# Patient Record
Sex: MALE | Race: WHITE | NOT HISPANIC OR LATINO | Employment: OTHER | URBAN - METROPOLITAN AREA
[De-identification: names, ages, dates, MRNs, and addresses within clinical notes are randomized per-mention and may not be internally consistent; named-entity substitution may affect disease eponyms.]

---

## 2017-05-01 ENCOUNTER — ALLSCRIPTS OFFICE VISIT (OUTPATIENT)
Dept: OTHER | Facility: OTHER | Age: 72
End: 2017-05-01

## 2017-05-04 ENCOUNTER — GENERIC CONVERSION - ENCOUNTER (OUTPATIENT)
Dept: OTHER | Facility: OTHER | Age: 72
End: 2017-05-04

## 2017-05-10 ENCOUNTER — GENERIC CONVERSION - ENCOUNTER (OUTPATIENT)
Dept: OTHER | Facility: OTHER | Age: 72
End: 2017-05-10

## 2017-05-17 ENCOUNTER — HOSPITAL ENCOUNTER (OUTPATIENT)
Dept: RADIOLOGY | Age: 72
Discharge: HOME | End: 2017-05-17
Payer: COMMERCIAL

## 2017-05-17 DIAGNOSIS — Z00.00 ENCOUNTER FOR GENERAL ADULT MEDICAL EXAMINATION WITHOUT ABNORMAL FINDINGS: ICD-10-CM

## 2017-05-17 DIAGNOSIS — J44.9 CHRONIC OBSTRUCTIVE PULMONARY DISEASE (HCC): ICD-10-CM

## 2017-05-17 PROCEDURE — 71260 CT THORAX DX C+: CPT

## 2017-05-17 RX ADMIN — IOHEXOL 85 ML: 350 INJECTION, SOLUTION INTRAVENOUS at 10:00

## 2017-05-19 ENCOUNTER — GENERIC CONVERSION - ENCOUNTER (OUTPATIENT)
Dept: OTHER | Facility: OTHER | Age: 72
End: 2017-05-19

## 2017-05-24 ENCOUNTER — GENERIC CONVERSION - ENCOUNTER (OUTPATIENT)
Dept: OTHER | Facility: OTHER | Age: 72
End: 2017-05-24

## 2017-05-30 ENCOUNTER — GENERIC CONVERSION - ENCOUNTER (OUTPATIENT)
Dept: OTHER | Facility: OTHER | Age: 72
End: 2017-05-30

## 2017-05-30 ENCOUNTER — ALLSCRIPTS OFFICE VISIT (OUTPATIENT)
Dept: OTHER | Facility: OTHER | Age: 72
End: 2017-05-30

## 2017-12-12 ENCOUNTER — LAB REQUISITION (OUTPATIENT)
Dept: LAB | Facility: HOSPITAL | Age: 72
End: 2017-12-12
Payer: COMMERCIAL

## 2017-12-12 DIAGNOSIS — C61 MALIGNANT NEOPLASM OF PROSTATE (HCC): ICD-10-CM

## 2017-12-12 LAB — PSA SERPL-MCNC: 0.5 NG/ML (ref 0–4)

## 2017-12-12 PROCEDURE — G0103 PSA SCREENING: HCPCS | Performed by: UROLOGY

## 2018-01-05 ENCOUNTER — ALLSCRIPTS OFFICE VISIT (OUTPATIENT)
Dept: OTHER | Facility: OTHER | Age: 73
End: 2018-01-05

## 2018-01-06 NOTE — PROGRESS NOTES
Assessment   1  Chronic obstructive pulmonary disease (496) (J44 9)   2  COPD exacerbation (491 21) (J44 1)    Plan   COPD exacerbation    · Azithromycin 250 MG Oral Tablet; TAKE 2 TABLETS ON DAY 1 THEN TAKE 1    TABLET A DAY FOR 4 DAYS   · Hydrocod Polst-CPM Polst ER 10-8 MG/5ML Oral Suspension Extended Release; 1    tsp every 12 hours as needed for cough    Discussion/Summary      We will initiate antibiotic therapy with azithromycin and we will add had a some long acting cough suppressant  The patient is encouraged to continue use of his Breo  If he should develop any high fever shaking chills he should contact the office for further instructions  Chief Complaint   pt here c/o coughing which is painful, and body aches from coughing,      History of Present Illness   HPI: Patient presents to the office with a 3 four-day history of worsening cough associated with chest pain and body aches  He denies any exposure to anyone with influenza  He has not had the flu vaccine  He denies any high fever his only complaint is that of a cough and the associated chest discomfort  He denies sore throat, he denies earaches, he denies any sinus congestion, he denies fevers, chills or night sweats  He has had no nausea, vomiting or diarrhea  Review of Systems        Constitutional: feeling poorly-- and-- feeling tired, but-- no fever-- and-- no chills  ENT: no complaints of earache, no loss of hearing, no nosebleeds or nasal discharge, no sore throat or hoarseness  Cardiovascular: chest pain  Respiratory: cough  Gastrointestinal: no complaints of abdominal pain, no constipation, no nausea or vomiting, no diarrhea or bloody stools  Genitourinary: no complaints of dysuria or incontinence, no hesitancy, no nocturia, no genital lesion, no inadequacy of penile erection  Musculoskeletal: myalgias  Integumentary: no complaints of skin rash or lesion, no itching or dry skin, no skin wounds  Neurological: no complaints of headache, no confusion, no numbness or tingling, no dizziness or fainting  Active Problems   1  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)   2  Chronic obstructive pulmonary disease (496) (J44 9)   3  Depression screening (V79 0) (Z13 89)   4  Diverticular disease of colon (562 10) (K57 30)   5  Elevated prostate specific antigen (PSA) (790 93) (R97 20)   6  Encounter for Medicare annual wellness exam (V70 0) (Z00 00)   7  Exertional shortness of breath (786 05) (R06 02)   8  Hemoptysis (786 30) (R04 2)   9  Hemorrhoids (455 6) (K64 9)   10  Hiatal hernia (553 3) (K44 9)   11  Lung nodule (793 11) (R91 1)   12  Organic impotence (607 84) (N52 9)   13  Prostate cancer (185) (C61)   14  Screening for genitourinary condition (V81 6) (Z13 89)   15  Screening for neurological condition (V80 09) (Z13 89)   16  Seborrheic keratosis (702 19) (L82 1)   17  Tobacco abuse (305 1) (Z72 0)    Past Medical History   Active Problems And Past Medical History Reviewed: The active problems and past medical history were reviewed and updated today  Social History    · Alcohol Use (History)   · Current Smoker (305 1)   · Never Used Drugs  The social history was reviewed and is unchanged  Current Meds    1  Breo Ellipta 100-25 MCG/INH Inhalation Aerosol Powder Breath Activated; INHALE 1     PUFFS Daily; Therapy: 11HCE1308 to (Evaluate:48Xbe1386)  Requested for: 61YDP9681; Last     Rx:20Pds3172 Ordered     The medication list was reviewed and updated today  Allergies   1  Penicillins  2  No Known Environmental Allergies   3   No Known Food Allergies    Vitals    Recorded: 32QAH4369 11:00AM   Temperature 97 9 F, Oral   Heart Rate 80   Pulse Quality Normal   Systolic 859, LUE, Sitting   Diastolic 68, LUE, Sitting   Height 5 ft 8 in   Weight 151 lb 2 oz   BMI Calculated 22 98   BSA Calculated 1 81     Physical Exam        Constitutional      General appearance: No acute distress, well appearing and well nourished  Eyes      Conjunctiva and lids: No swelling, erythema, or discharge  Pupils and irises: Equal, round and reactive to light  Ears, Nose, Mouth, and Throat      External inspection of ears and nose: Normal        Nasal mucosa, septum, and turbinates: Normal without edema or erythema  Oropharynx: Normal with no erythema, edema, exudate or lesions  Pulmonary      Respiratory effort: No increased work of breathing or signs of respiratory distress  -- Diminished breath sounds bilaterally without rales, rhonchi or wheezing  Cardiovascular      Palpation of heart: Normal PMI, no thrills  Auscultation of heart: Normal rate and rhythm, normal S1 and S2, without murmurs  Examination of extremities for edema and/or varicosities: Normal        Abdomen      Abdomen: Non-tender, no masses  Lymphatic      Palpation of lymph nodes in neck: No lymphadenopathy  Musculoskeletal      Gait and station: Normal        Digits and nails: Normal without clubbing or cyanosis  Skin      Skin and subcutaneous tissue: Normal without rashes or lesions  Neurologic No focal deficit        Psychiatric      Orientation to person, place and time: Normal        Mood and affect: Normal           Signatures    Electronically signed by : Samuel Hinkle MD; Jan 5 2018 11:54AM EST                       (Author)

## 2018-01-12 NOTE — PROGRESS NOTES
Preliminary Nursing Report                Patient Information    Initial Encounter Entry Date:   2016 9:10 AM EST (Automated Transmission Automated Transmission)       Last Modified:   {Alyssa Hall}              Legal Name: Honey Cross        Social Security Number:        YOB: 1945        Age (years): 65        Gender: M        Body Mass Index (BMI): 23 kg/m2        Height: 69 in  Weight: 156 lbs (71 kgs)           Address:   [address] [place] [address]              Phone: [phone]        Email:        Ethnicity: [facility]        Church:        Marital Status:        Preferred Language: English        Race: Other Race                    Patient Insurance Information        Primary Insurance Information Carrier Name: {Primary  CarrierName}           Carrier Address:   {Primary  CarrierAddress}              Carrier Phone: {Primary  CarrierPhone}          Group Number: {Primary  GroupNumber}          Policy Number: {Primary  PolicyNumber}          Insured Name: {Primary  InsuredName}          Insured : {Primary  InsuredDOB}          Relationship to Insured: {Primary  RelationshiptoInsured}           Secondary Insurance Information Carrier Name: {Secondary  CarrierName}           Carrier Address:   {Secondary  CarrierAddress}              Carrier Phone: {Secondary  CarrierPhone}          Group Number: {Secondary  GroupNumber}          Policy Number: {Secondary  PolicyNumber}          Insured Name: {Secondary  InsuredName}          Insured : {Secondary  InsuredDOB}          Relationship to Insured: {Secondary  RelationshiptoInsured}                       Health Profile   Booking #:   Chelsie Langston #: [id number]               DOS: 2016    Surgery : INSERTION OF MULTI-COMPONENT, INFLATABLE PENILE PROSTHESIS, INCLUDING PLACEMENT OF PUMP, CYLINDERS, AND RESERVOIR    Add'l Procedures/Notes:     Surgery Risk: Minor          Precautions          Allergies No Known Environmental Allergies       No Known Food Allergies       Penicillins             Medications    Clotrimazole 1 % External Cream       Levitra 20 MG Oral Tablet       Symbicort 160-4 5 MCG/ACT Inhalation Aerosol       Zyrtec 10 MG TABS               Conditions    Chronic obstructive pulmonary disease       Contact dermatitis       Elevated prostate specific antigen (PSA)       Hemoptysis       Limited Exercise Tolerance       Organic impotence       Prostate cancer       Screening for genitourinary condition       Screening for neurological condition       Seborrheic keratosis               Family History    None             Surgical History    None             Social History    Alcohol Use (History)       Current Smoker       Never Used Drugs                               Patient Instructions       ? NPO Instructions   The day before surgery it is recommended to have a light dinner at your usual time and you are allowed a light snack early in the evening  Do not eat anything heavy or eat a big meal after 7pm  Do not eat or drink anything after midnight prior to your surgery  If you are supposed to take any of your medications, do so with a sip of water  Failure to follow these instructions can lead to an increased risk of lung complications and may result in a delay or cancellation of your procedure  If you have any questions, contact your institution for further instructions  No candy, no gum, no mints, no chewing tobacco   Triggered by: Medical Procedure Risk         ? Smoking Cessation   Smoking before and after surgery can lead to complications  Patients who quit smoking at least eight weeks before surgery have complication rates almost as low as non-smokers  Smokers who can stop smoking 24 or 48 hours before surgery may also benefit from decreased amounts of nicotine and carbon monoxide in the body   For help quitting smoking, speak with your physician or contact the Charline Fernandez or American [employer] Association  Please visit the following web address for assistance with quitting  [facility] [address] [employer]  [facility]/Anesthesia-Topics/[email or web address]-[email or web address]  aspx  Triggered by: Current Smoker               Testing Considerations       ? Urinalysis t  Urinalysis may be appropriate if recent urinary symptoms or implants are being placed in surgical procedure  Triggered by: Medical Procedure               Consultations       No recommendations for this classification  Miscellaneous Questions         Question: Are you able to walk up a flight of stairs, walk up a hill or do heavy housework WITHOUT having chest pain or shortness of breath? Answer: NO                   Allergies/Conditions/Medications Not Found        The following were not recognized by our system when generating the recommendations  Please consider if this would impact any preoperative protocols  ? Alcohol Use (History)       ? Never Used Drugs       ? Screening for neurological condition       ? Symbicort 160-4 5 MCG/ACT Inhalation Aerosol                  Appointment Information         Date:    03/08/2016        Location:    [facility]        Address:           Directions:                      Footnotes revision 14      ?? Denotes a free-text entry  Legal Disclaimer: Any and all recommendations and services provided herein are designed to assist in the preoperative care of the patient  Nothing contained herein is designed to replace, eliminate or alleviate the responsibility of the attending physician to supervise and determine the patient?s preoperative care and course of treatment  Failure to provide complete, accurate information may negatively impact the system?s ability to recommend the proper preoperative protocol       THE ATTENDING PHYSICIAN IS RESPONSIBLE TO REVIEW THE SUGGESTED PREOPERATIVE PROTOCOLS/COURSE OF TREATMENT AND PRESCRIBE THE FINAL COURSE OF PREOPERATIVE TREATMENT IN CONSULTATION WITH THE PATIENT  THE ePREOP SYSTEM AND ITS MATERIALS ARE PROVIDED ? AS IS? WITHOUT WARRANTY OF ANY KIND, EXPRESS OR IMPLIED, INCLUDING, BUT NOT LIMITED TO, WARRANTIES OF PERFORMANCE OR MERCHANTABILITY OR FITNESS FOR A PARTICULAR PURPOSE  PATIENT AND PHYSICIANS HEREBY AGREE THAT THEIR USE OF THE MATERIALS AND RESOURCES ACT AS A CONSENT TO RELEASE AND WAIVE ePREOP FROM ANY AND ALL CLAIMS OF WARRANTY, TORT OR CONTRACT LAW OF ANY KIND             Electronically signed by:Davi Barakat MD  Feb 16 2016  1:35PM EST

## 2018-01-14 VITALS
WEIGHT: 153.38 LBS | TEMPERATURE: 96.9 F | BODY MASS INDEX: 23.25 KG/M2 | HEART RATE: 63 BPM | OXYGEN SATURATION: 97 % | SYSTOLIC BLOOD PRESSURE: 122 MMHG | DIASTOLIC BLOOD PRESSURE: 82 MMHG | HEIGHT: 68 IN

## 2018-01-14 VITALS
BODY MASS INDEX: 23.04 KG/M2 | TEMPERATURE: 97.1 F | WEIGHT: 152 LBS | OXYGEN SATURATION: 94 % | HEIGHT: 68 IN | HEART RATE: 54 BPM | SYSTOLIC BLOOD PRESSURE: 136 MMHG | DIASTOLIC BLOOD PRESSURE: 76 MMHG

## 2018-01-22 VITALS
HEIGHT: 68 IN | SYSTOLIC BLOOD PRESSURE: 120 MMHG | TEMPERATURE: 97.9 F | HEART RATE: 80 BPM | WEIGHT: 151.13 LBS | BODY MASS INDEX: 22.9 KG/M2 | DIASTOLIC BLOOD PRESSURE: 68 MMHG

## 2018-02-12 RX ORDER — FLUTICASONE FUROATE AND VILANTEROL TRIFENATATE 100; 25 UG/1; UG/1
POWDER RESPIRATORY (INHALATION)
Refills: 2 | OUTPATIENT
Start: 2018-02-12

## 2018-02-16 ENCOUNTER — OFFICE VISIT (OUTPATIENT)
Dept: INTERNAL MEDICINE CLINIC | Facility: CLINIC | Age: 73
End: 2018-02-16
Payer: COMMERCIAL

## 2018-02-16 VITALS
SYSTOLIC BLOOD PRESSURE: 146 MMHG | BODY MASS INDEX: 22.91 KG/M2 | DIASTOLIC BLOOD PRESSURE: 84 MMHG | TEMPERATURE: 97.7 F | HEART RATE: 64 BPM | WEIGHT: 151.2 LBS | HEIGHT: 68 IN | OXYGEN SATURATION: 97 %

## 2018-02-16 DIAGNOSIS — M62.81 MUSCLE WEAKNESS: ICD-10-CM

## 2018-02-16 DIAGNOSIS — J44.1 COPD EXACERBATION (HCC): ICD-10-CM

## 2018-02-16 DIAGNOSIS — J43.1 PANLOBULAR EMPHYSEMA (HCC): ICD-10-CM

## 2018-02-16 DIAGNOSIS — N52.35 ERECTILE DYSFUNCTION FOLLOWING RADIATION THERAPY: ICD-10-CM

## 2018-02-16 DIAGNOSIS — L30.9 DERMATITIS: ICD-10-CM

## 2018-02-16 DIAGNOSIS — Z13.6 ENCOUNTER FOR ABDOMINAL AORTIC ANEURYSM (AAA) SCREENING: ICD-10-CM

## 2018-02-16 DIAGNOSIS — K22.70 BARRETT'S ESOPHAGUS WITHOUT DYSPLASIA: Primary | ICD-10-CM

## 2018-02-16 DIAGNOSIS — R91.1 LUNG NODULE: ICD-10-CM

## 2018-02-16 PROBLEM — K64.9 HEMORRHOIDS: Status: ACTIVE | Noted: 2017-05-06

## 2018-02-16 PROBLEM — N52.9 ERECTILE DYSFUNCTION: Status: ACTIVE | Noted: 2017-12-12

## 2018-02-16 PROCEDURE — 99214 OFFICE O/P EST MOD 30 MIN: CPT | Performed by: INTERNAL MEDICINE

## 2018-02-16 RX ORDER — FLUTICASONE FUROATE AND VILANTEROL 100; 25 UG/1; UG/1
1 POWDER RESPIRATORY (INHALATION) DAILY
COMMUNITY
Start: 2017-05-01 | End: 2018-02-23 | Stop reason: SDUPTHER

## 2018-02-16 RX ORDER — LANSOPRAZOLE 30 MG/1
1 CAPSULE, DELAYED RELEASE ORAL DAILY
Refills: 5 | COMMUNITY
Start: 2018-01-16 | End: 2019-02-15

## 2018-02-16 NOTE — PROGRESS NOTES
Assessment/Plan:       Diagnoses and all orders for this visit:    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  He is followed up by the  GI for his the Smith's esophagus  Encounter for abdominal aortic aneurysm (AAA) screening  -     US abdominal aorta screening aaa; Future    COPD exacerbation ([address] )  -     Spirometry; Future   COPD is stable  Lung nodule  -      Follow-up CT scan did not reveal any new lung nodule which was done at the end of the 2017  Panlobular emphysema [facility])   patient is again advised to lose not to smoke or drink alcohol that will help his the Smith's esophagus problem and also his problem with emphysema  Erectile dysfunction following radiation therapy   erectile dysfunction is secondary to radiation treatment for prostate cancer  Other orders  -     fluticasone furoate-vilanterol (BREO ELLIPTA); Inhale 1 puff daily  -     lansoprazole (PREVACID) 30 mg capsule; Take 1 capsule by mouth 2 (two) times a day as needed          Subjective:    presently patient has a complain of some stool rash on the lower back also wrinkling of his skin and skin rash on the lower back   Patient ID: Martínez Sheriff is a 74 y o  male  HPI    The following portions of the patient's history were reviewed and updated as appropriate: allergies, current medications, past family history, past medical history, past social history, past surgical history and problem list   COPD (Follow-Up): The patient states he has been he had t the CT scan of the lung , he still is smoking he is using the Breo once a day, but doing well with his COPD control since the last visit  Symptoms: worsened dyspnea on exertion,Ik8esvxkpma exercise intolerance,Md7gicdrblj coughing,Ik3kdbsvvzh coughing up sputum,Np9sximkc wheezing,De3iuvbwt lower extremity [id number]   Associated Symptoms: no fever  Smith Esophagus (Brief): The patient is being seen for a routine clinic follow-up of Smith's esophagus   The patient is currently asymptomatic  No associated symptoms are reported  Review of Systems   Constitutional: Negative for appetite change, fatigue and fever  HENT: Negative for congestion, ear pain, hearing loss, nosebleeds, sneezing, tinnitus and voice change  Eyes: Negative for pain, discharge and redness  Respiratory: Positive for shortness of breath  Negative for cough, chest tightness and wheezing  Cardiovascular: Negative for chest pain, palpitations and leg swelling  Gastrointestinal: Negative for abdominal pain, blood in stool, constipation, diarrhea, nausea and vomiting  Genitourinary: Negative for difficulty urinating, dysuria, hematuria and urgency  Musculoskeletal: Negative for arthralgias, back pain, gait problem and joint swelling  Skin: Positive for rash  Negative for wound  Allergic/Immunologic: Negative for environmental allergies  Neurological: Negative for dizziness, tremors, seizures, weakness, light-headedness and numbness  Hematological: Negative for adenopathy  Does not bruise/bleed easily  Psychiatric/Behavioral: Negative for behavioral problems and confusion  The patient is not nervous/anxious            Past Medical History:   Diagnosis Date    Esophageal reflux     Inguinal hernia     Rectal adenocarcinoma (HCC)     Spermatocele          Current Outpatient Prescriptions:     fluticasone furoate-vilanterol (BREO ELLIPTA), Inhale 1 puff daily, Disp: , Rfl:     lansoprazole (PREVACID) 30 mg capsule, Take 1 capsule by mouth 2 (two) times a day as needed, Disp: , Rfl: 5    Allergies   Allergen Reactions    Penicillins Hives     Reaction Unknown       Social History   Past Surgical History:   Procedure Laterality Date    INSERTION PROSTATE RADIATION SEED      OK INSERT,INFLATABLE PENILE PROSTHESIS N/A 3/8/2016    Procedure: INSERTION OF THREE PIECE PENILE PROSTHESIS;  Surgeon: Andreia Resendiz MD;  [facility];  Service: Urology    PROSTATE BIOPSY  01/06/2014 History reviewed  No pertinent family history  Objective:  /84 (BP Location: Left arm, Patient Position: Sitting, Cuff Size: Adult)   Pulse 64   Temp 97 7 °F (36 5 °C) (Oral)   Ht 5' 8" (1 727 m)   Wt 68 6 kg (151 lb 3 2 oz)   SpO2 97%   BMI 22 99 kg/m²        Physical Exam   Constitutional: He is oriented to person, place, and time  He appears well-developed and well-nourished  HENT:   Right Ear: External ear normal    Mouth/Throat: Oropharynx is clear and moist    Eyes: Conjunctivae and EOM are normal  Pupils are equal, round, and reactive to light  Neck: Normal range of motion  No JVD present  No thyromegaly present  Cardiovascular: Normal rate, regular rhythm, normal heart sounds and intact distal pulses  Pulmonary/Chest: Breath sounds normal    Decreased breath sounds bilaterally   Abdominal: Soft  Bowel sounds are normal    Musculoskeletal: Normal range of motion  Lymphadenopathy:     He has no cervical adenopathy  Neurological: He is alert and oriented to person, place, and time  He has normal reflexes  Skin: Skin is dry  Very dry skin plus lot of wrinkling of the skin also on the back there is some macular rash in the lower part of the back   Psychiatric: He has a normal mood and affect   His behavior is normal  Judgment and thought content normal

## 2018-02-19 RX ORDER — FLUTICASONE FUROATE AND VILANTEROL TRIFENATATE 100; 25 UG/1; UG/1
1 POWDER RESPIRATORY (INHALATION) DAILY
Refills: 2 | OUTPATIENT
Start: 2018-02-19

## 2018-02-20 RX ORDER — FLUTICASONE FUROATE AND VILANTEROL TRIFENATATE 100; 25 UG/1; UG/1
1 POWDER RESPIRATORY (INHALATION) DAILY
Refills: 2 | OUTPATIENT
Start: 2018-02-20

## 2018-02-23 DIAGNOSIS — J44.9 CHRONIC OBSTRUCTIVE PULMONARY DISEASE, UNSPECIFIED COPD TYPE (HCC): Primary | ICD-10-CM

## 2018-02-23 RX ORDER — FLUTICASONE FUROATE AND VILANTEROL 100; 25 UG/1; UG/1
1 POWDER RESPIRATORY (INHALATION) DAILY
Qty: 30 EACH | Refills: 0 | Status: SHIPPED | OUTPATIENT
Start: 2018-02-23 | End: 2018-03-23 | Stop reason: SDUPTHER

## 2018-02-26 ENCOUNTER — HOSPITAL ENCOUNTER (OUTPATIENT)
Dept: RADIOLOGY | Facility: HOSPITAL | Age: 73
Discharge: HOME | End: 2018-02-26
Attending: INTERNAL MEDICINE
Payer: COMMERCIAL

## 2018-02-26 ENCOUNTER — HOSPITAL ENCOUNTER (OUTPATIENT)
Dept: PULMONOLOGY | Facility: HOSPITAL | Age: 73
Discharge: HOME | End: 2018-02-26
Attending: INTERNAL MEDICINE
Payer: COMMERCIAL

## 2018-02-26 DIAGNOSIS — Z13.6 ENCOUNTER FOR ABDOMINAL AORTIC ANEURYSM (AAA) SCREENING: ICD-10-CM

## 2018-02-26 DIAGNOSIS — R91.1 LUNG NODULE: ICD-10-CM

## 2018-02-26 DIAGNOSIS — J44.1 COPD EXACERBATION (HCC): ICD-10-CM

## 2018-02-26 PROCEDURE — 94760 N-INVAS EAR/PLS OXIMETRY 1: CPT

## 2018-02-26 PROCEDURE — 94010 BREATHING CAPACITY TEST: CPT | Performed by: INTERNAL MEDICINE

## 2018-02-26 PROCEDURE — 94010 BREATHING CAPACITY TEST: CPT

## 2018-02-26 PROCEDURE — 76706 US ABDL AORTA SCREEN AAA: CPT

## 2018-03-23 DIAGNOSIS — J44.9 CHRONIC OBSTRUCTIVE PULMONARY DISEASE, UNSPECIFIED COPD TYPE (HCC): ICD-10-CM

## 2018-03-23 RX ORDER — FLUTICASONE FUROATE AND VILANTEROL TRIFENATATE 100; 25 UG/1; UG/1
1 POWDER RESPIRATORY (INHALATION) DAILY
Qty: 2 EACH | Refills: 2 | Status: SHIPPED | OUTPATIENT
Start: 2018-03-23 | End: 2018-09-24 | Stop reason: SDUPTHER

## 2018-08-08 ENCOUNTER — OFFICE VISIT (OUTPATIENT)
Dept: INTERNAL MEDICINE CLINIC | Facility: CLINIC | Age: 73
End: 2018-08-08
Payer: COMMERCIAL

## 2018-08-08 VITALS
DIASTOLIC BLOOD PRESSURE: 66 MMHG | RESPIRATION RATE: 20 BRPM | TEMPERATURE: 98.3 F | HEART RATE: 62 BPM | HEIGHT: 68 IN | WEIGHT: 144.1 LBS | SYSTOLIC BLOOD PRESSURE: 100 MMHG | BODY MASS INDEX: 21.84 KG/M2 | OXYGEN SATURATION: 93 %

## 2018-08-08 DIAGNOSIS — K21.9 GASTROESOPHAGEAL REFLUX DISEASE, ESOPHAGITIS PRESENCE NOT SPECIFIED: ICD-10-CM

## 2018-08-08 DIAGNOSIS — J44.9 CHRONIC OBSTRUCTIVE PULMONARY DISEASE, UNSPECIFIED COPD TYPE (HCC): ICD-10-CM

## 2018-08-08 DIAGNOSIS — J44.1 COPD EXACERBATION (HCC): Primary | ICD-10-CM

## 2018-08-08 DIAGNOSIS — K22.70 BARRETT'S ESOPHAGUS WITHOUT DYSPLASIA: ICD-10-CM

## 2018-08-08 PROBLEM — K57.30 DIVERTICULAR DISEASE OF COLON: Status: ACTIVE | Noted: 2017-05-24

## 2018-08-08 PROBLEM — K44.9 HIATAL HERNIA: Status: ACTIVE | Noted: 2017-05-06

## 2018-08-08 PROCEDURE — 1160F RVW MEDS BY RX/DR IN RCRD: CPT | Performed by: NURSE PRACTITIONER

## 2018-08-08 PROCEDURE — 3725F SCREEN DEPRESSION PERFORMED: CPT | Performed by: NURSE PRACTITIONER

## 2018-08-08 PROCEDURE — 99214 OFFICE O/P EST MOD 30 MIN: CPT | Performed by: NURSE PRACTITIONER

## 2018-08-08 PROCEDURE — 4040F PNEUMOC VAC/ADMIN/RCVD: CPT | Performed by: NURSE PRACTITIONER

## 2018-08-08 PROCEDURE — 1101F PT FALLS ASSESS-DOCD LE1/YR: CPT | Performed by: NURSE PRACTITIONER

## 2018-08-08 RX ORDER — AZITHROMYCIN 250 MG/1
TABLET, FILM COATED ORAL
Qty: 6 TABLET | Refills: 0 | Status: SHIPPED | OUTPATIENT
Start: 2018-08-08 | End: 2018-08-13

## 2018-08-08 NOTE — PROGRESS NOTES
Assessment/Plan:    COPD Exacerbation:  Will start z-pack for COPD exacerbation with change in sputum color and increased SOB  Overall his COPD is stable with breo  D/W pt smoking cessation  He is not interested in this time  Last CT May 2017  GERD/History Smith Esophagus:  Stable  Pt tries to avoid aggravating foods  He uses lanzoprazole prn  Follow-up with Dr Anastasia Mojica in 6 months and have labs completed prior to appt  No new labs to review  Diagnoses and all orders for this visit:    COPD exacerbation ([facility] )  -     azithromycin (ZITHROMAX) 250 mg tablet; Take two tabs today and then one tab daily until complete  -     CBC and differential; Future  -     Comprehensive metabolic panel; Future  -     Lipid panel; Future    Chronic obstructive pulmonary disease, unspecified COPD type ([facility] )    Gastroesophageal reflux disease, esophagitis presence not specified    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        Subjective:      Patient ID: Alejandrina Davila is a 74 y o  male  HPI    COPD  Pt presents for COPD follow-up  Overall he is doing well  However he started with a cough on Sunday  Patient complains of cough productive of yellow sputum in moderate amounts  He is additionally having increased SOB recently from baseline  Pt denies fever/chills  Patient currently is not on home oxygen therapy  [place] Respiratory history: COPD  He currently smokes approx 1/2 PPD  GERD  Patient complains of dyspepsia  Symptoms include no other symptoms  The patient denies abdominal bloating, belching, chest pain, dysphagia and fullness after meals  Symptoms appear to be worsened by acidicy foods  Risk factors present for GERD include tobacco abuse, alcohol use and previous diagnosis of Smith's esophagus  Treatments tried so far include proton pump inhibitor: lanzoprazole as needed  Results of treatment: complete resolution of all symptoms   Currently, the symptoms are none    The following portions of the patient's history were reviewed and updated as appropriate: allergies, current medications, past family history, past medical history, past social history, past surgical history and problem list     Review of Systems   Constitutional: Positive for fatigue  Negative for chills, fever and unexpected weight change  HENT: Positive for rhinorrhea  Negative for congestion, ear pain, postnasal drip, sinus pain, sinus pressure and sore throat  Respiratory: Positive for cough and shortness of breath  Negative for wheezing  Cardiovascular: Negative for chest pain and palpitations  Gastrointestinal: Negative for constipation, diarrhea, nausea and vomiting  Neurological: Negative for dizziness, light-headedness and headaches  Psychiatric/Behavioral: Negative for dysphoric mood  The patient is not nervous/anxious            Past Medical History:   Diagnosis Date    COPD (chronic obstructive pulmonary disease) (HCC)     Esophageal reflux     Inguinal hernia     Rectal adenocarcinoma (HCC)     Spermatocele          Current Outpatient Prescriptions:     BREO ELLIPTA, INHALE 1 PUFF DAILY, Disp: 2 each, Rfl: 2    lansoprazole (PREVACID) 30 mg capsule, Take 1 capsule by mouth daily  , Disp: , Rfl: 5    Allergies   Allergen Reactions    Penicillins Hives     Other reaction(s): facial swelling  Reaction Unknown       Social History   Past Surgical History:   Procedure Laterality Date    HERNIA REPAIR      INSERTION PROSTATE RADIATION SEED      NM INSERT,INFLATABLE PENILE PROSTHESIS N/A 3/8/2016    Procedure: INSERTION OF THREE PIECE PENILE PROSTHESIS;  Surgeon: Lukasz Mccartney MD;  [facility];  Service: Urology    PROSTATE BIOPSY  01/06/2014     Family History   Problem Relation Age of Onset    No Known Problems Mother     No Known Problems Father        Objective:  /66 (BP Location: Left arm, Patient Position: Sitting, Cuff Size: Adult)   Pulse 62   Temp 98 3 °F (36 8 °C) (Oral)   Resp 20  5' 8" (1 727 m)   Wt 65 4 kg (144 lb 1 6 oz)   SpO2 93%   BMI 21 91 kg/m²      Physical Exam   Constitutional: He is oriented to person, place, and time  He appears well-developed and well-nourished  No distress  HENT:   Head: Normocephalic and atraumatic  Right Ear: Hearing, tympanic membrane, external ear and ear canal normal    Left Ear: Hearing, tympanic membrane, external ear and ear canal normal    Nose: Nose normal    Mouth/Throat: Uvula is midline, oropharynx is clear and moist and mucous membranes are normal    Neck: Neck supple  Cardiovascular: Normal rate, regular rhythm and normal heart sounds  No murmur heard  No pedal edema   Pulmonary/Chest: Effort normal and breath sounds normal  No respiratory distress  He has no wheezes  He has no rales  Lungs decreased throughout   Lymphadenopathy:     He has no cervical adenopathy  Neurological: He is alert and oriented to person, place, and time  No focal deficits   Skin: Skin is warm and dry  No rash noted  Psychiatric: He has a normal mood and affect  His behavior is normal  Judgment and thought content normal    Nursing note and vitals reviewed

## 2018-08-08 NOTE — PATIENT INSTRUCTIONS
Take antibiotic for COPD exacerbation  No medication changes    Pt is not interested in stop smoking at this time

## 2018-09-20 DIAGNOSIS — J44.9 CHRONIC OBSTRUCTIVE PULMONARY DISEASE, UNSPECIFIED COPD TYPE (HCC): ICD-10-CM

## 2018-09-20 RX ORDER — FLUTICASONE FUROATE AND VILANTEROL TRIFENATATE 100; 25 UG/1; UG/1
1 POWDER RESPIRATORY (INHALATION) DAILY
Refills: 2 | OUTPATIENT
Start: 2018-09-20

## 2018-09-24 DIAGNOSIS — J44.9 CHRONIC OBSTRUCTIVE PULMONARY DISEASE, UNSPECIFIED COPD TYPE (HCC): ICD-10-CM

## 2018-09-24 RX ORDER — FLUTICASONE FUROATE AND VILANTEROL 100; 25 UG/1; UG/1
1 POWDER RESPIRATORY (INHALATION) DAILY
Qty: 2 EACH | Refills: 2 | Status: SHIPPED | OUTPATIENT
Start: 2018-09-24 | End: 2018-11-15 | Stop reason: SDUPTHER

## 2018-11-15 DIAGNOSIS — J44.9 CHRONIC OBSTRUCTIVE PULMONARY DISEASE, UNSPECIFIED COPD TYPE (HCC): ICD-10-CM

## 2018-11-15 RX ORDER — FLUTICASONE FUROATE AND VILANTEROL 100; 25 UG/1; UG/1
1 POWDER RESPIRATORY (INHALATION) DAILY
Qty: 3 INHALER | Refills: 1 | Status: SHIPPED | OUTPATIENT
Start: 2018-11-15 | End: 2019-05-09

## 2018-12-13 ENCOUNTER — TRANSCRIBE ORDERS (OUTPATIENT)
Dept: ADMINISTRATIVE | Facility: HOSPITAL | Age: 73
End: 2018-12-13

## 2018-12-13 ENCOUNTER — APPOINTMENT (OUTPATIENT)
Dept: LAB | Facility: IMAGING CENTER | Age: 73
End: 2018-12-13
Payer: COMMERCIAL

## 2018-12-13 DIAGNOSIS — C61 MALIGNANT NEOPLASM OF PROSTATE (HCC): ICD-10-CM

## 2018-12-13 DIAGNOSIS — J44.1 COPD EXACERBATION (HCC): ICD-10-CM

## 2018-12-13 DIAGNOSIS — C61 MALIGNANT NEOPLASM OF PROSTATE (HCC): Primary | ICD-10-CM

## 2018-12-13 LAB
ALBUMIN SERPL BCP-MCNC: 3.8 G/DL (ref 3.5–5)
ALP SERPL-CCNC: 62 U/L (ref 46–116)
ALT SERPL W P-5'-P-CCNC: 25 U/L (ref 12–78)
ANION GAP SERPL CALCULATED.3IONS-SCNC: 7 MMOL/L (ref 4–13)
AST SERPL W P-5'-P-CCNC: 27 U/L (ref 5–45)
BASOPHILS # BLD AUTO: 0.04 THOUSANDS/ΜL (ref 0–0.1)
BASOPHILS NFR BLD AUTO: 1 % (ref 0–1)
BILIRUB SERPL-MCNC: 0.66 MG/DL (ref 0.2–1)
BUN SERPL-MCNC: 15 MG/DL (ref 5–25)
CALCIUM SERPL-MCNC: 8.7 MG/DL (ref 8.3–10.1)
CHLORIDE SERPL-SCNC: 107 MMOL/L (ref 100–108)
CHOLEST SERPL-MCNC: 182 MG/DL (ref 50–200)
CO2 SERPL-SCNC: 28 MMOL/L (ref 21–32)
CREAT SERPL-MCNC: 0.86 MG/DL (ref 0.6–1.3)
EOSINOPHIL # BLD AUTO: 0.17 THOUSAND/ΜL (ref 0–0.61)
EOSINOPHIL NFR BLD AUTO: 2 % (ref 0–6)
ERYTHROCYTE [DISTWIDTH] IN BLOOD BY AUTOMATED COUNT: 13.6 % (ref 11.6–15.1)
GFR SERPL CREATININE-BSD FRML MDRD: 85 ML/MIN/1.73SQ M
GLUCOSE P FAST SERPL-MCNC: 89 MG/DL (ref 65–99)
HCT VFR BLD AUTO: 47.8 % (ref 36.5–49.3)
HDLC SERPL-MCNC: 75 MG/DL (ref 40–60)
HGB BLD-MCNC: 15.3 G/DL (ref 12–17)
IMM GRANULOCYTES # BLD AUTO: 0.03 THOUSAND/UL (ref 0–0.2)
IMM GRANULOCYTES NFR BLD AUTO: 0 % (ref 0–2)
LDLC SERPL CALC-MCNC: 96 MG/DL (ref 0–100)
LYMPHOCYTES # BLD AUTO: 1.08 THOUSANDS/ΜL (ref 0.6–4.47)
LYMPHOCYTES NFR BLD AUTO: 16 % (ref 14–44)
MCH RBC QN AUTO: 32.7 PG (ref 26.8–34.3)
MCHC RBC AUTO-ENTMCNC: 32 G/DL (ref 31.4–37.4)
MCV RBC AUTO: 102 FL (ref 82–98)
MONOCYTES # BLD AUTO: 0.83 THOUSAND/ΜL (ref 0.17–1.22)
MONOCYTES NFR BLD AUTO: 12 % (ref 4–12)
NEUTROPHILS # BLD AUTO: 4.79 THOUSANDS/ΜL (ref 1.85–7.62)
NEUTS SEG NFR BLD AUTO: 69 % (ref 43–75)
NONHDLC SERPL-MCNC: 107 MG/DL
NRBC BLD AUTO-RTO: 0 /100 WBCS
PLATELET # BLD AUTO: 196 THOUSANDS/UL (ref 149–390)
PMV BLD AUTO: 10.3 FL (ref 8.9–12.7)
POTASSIUM SERPL-SCNC: 4.8 MMOL/L (ref 3.5–5.3)
PROT SERPL-MCNC: 7.4 G/DL (ref 6.4–8.2)
PSA SERPL-MCNC: 0.4 NG/ML (ref 0–4)
RBC # BLD AUTO: 4.68 MILLION/UL (ref 3.88–5.62)
SODIUM SERPL-SCNC: 142 MMOL/L (ref 136–145)
TRIGL SERPL-MCNC: 56 MG/DL
WBC # BLD AUTO: 6.94 THOUSAND/UL (ref 4.31–10.16)

## 2018-12-13 PROCEDURE — 80053 COMPREHEN METABOLIC PANEL: CPT

## 2018-12-13 PROCEDURE — 85025 COMPLETE CBC W/AUTO DIFF WBC: CPT

## 2018-12-13 PROCEDURE — 36415 COLL VENOUS BLD VENIPUNCTURE: CPT

## 2018-12-13 PROCEDURE — 84153 ASSAY OF PSA TOTAL: CPT

## 2018-12-13 PROCEDURE — 80061 LIPID PANEL: CPT

## 2019-02-15 ENCOUNTER — OFFICE VISIT (OUTPATIENT)
Dept: INTERNAL MEDICINE CLINIC | Facility: CLINIC | Age: 74
End: 2019-02-15
Payer: COMMERCIAL

## 2019-02-15 VITALS
DIASTOLIC BLOOD PRESSURE: 78 MMHG | SYSTOLIC BLOOD PRESSURE: 130 MMHG | TEMPERATURE: 97.3 F | HEART RATE: 64 BPM | WEIGHT: 144.8 LBS | BODY MASS INDEX: 22.02 KG/M2

## 2019-02-15 DIAGNOSIS — Z72.0 TOBACCO ABUSE: ICD-10-CM

## 2019-02-15 DIAGNOSIS — K21.9 GASTROESOPHAGEAL REFLUX DISEASE, ESOPHAGITIS PRESENCE NOT SPECIFIED: ICD-10-CM

## 2019-02-15 DIAGNOSIS — J43.1 PANLOBULAR EMPHYSEMA (HCC): ICD-10-CM

## 2019-02-15 DIAGNOSIS — K22.70 BARRETT'S ESOPHAGUS WITHOUT DYSPLASIA: Primary | ICD-10-CM

## 2019-02-15 PROBLEM — J44.1 COPD EXACERBATION (HCC): Status: RESOLVED | Noted: 2018-01-05 | Resolved: 2019-02-15

## 2019-02-15 PROCEDURE — 99406 BEHAV CHNG SMOKING 3-10 MIN: CPT | Performed by: INTERNAL MEDICINE

## 2019-02-15 PROCEDURE — 1160F RVW MEDS BY RX/DR IN RCRD: CPT | Performed by: INTERNAL MEDICINE

## 2019-02-15 PROCEDURE — 4004F PT TOBACCO SCREEN RCVD TLK: CPT | Performed by: INTERNAL MEDICINE

## 2019-02-15 PROCEDURE — 99214 OFFICE O/P EST MOD 30 MIN: CPT | Performed by: INTERNAL MEDICINE

## 2019-02-15 RX ORDER — LANSOPRAZOLE 30 MG/1
30 CAPSULE, DELAYED RELEASE ORAL DAILY
Qty: 30 CAPSULE | Refills: 5 | Status: SHIPPED | OUTPATIENT
Start: 2019-02-15 | End: 2019-10-04 | Stop reason: SDUPTHER

## 2019-02-15 NOTE — PROGRESS NOTES
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  -     lansoprazole (PREVACID) 30 mg capsule; Take 1 capsule (30 mg total) by mouth daily    Gastroesophageal reflux disease, esophagitis presence not specified  -     lansoprazole (PREVACID) 30 mg capsule; Take 1 capsule (30 mg total) by mouth daily    Panlobular emphysema ([address] )    patient is using Breo for emphysema his breathing is as usual nothing any worse than before    Subjective:   [de-identified] years young gentleman who is here today for the follow-up of his medical conditions which is Smith's esophagus and gastroesophageal reflux disease recently stop taking his PPI I recommended that he stay on the PPI and follow up with a gastroenterologist for surveillance of the Smith esophagus, also he has COPD he continued to smoke discussed about smoking use of alcohol Smith esophagus and COPD  Patient ID: Martínez Sheriff is a 74 y o  male  HPI    The following portions of the patient's history were reviewed and updated as appropriate: allergies, current medications, past family history, past medical history, past social history, past surgical history and problem list     Review of Systems   Constitutional: Negative for chills, fatigue, fever and unexpected weight change  HENT: Negative for congestion, ear pain, postnasal drip, rhinorrhea, sinus pressure, sinus pain and sore throat  Respiratory: Positive for shortness of breath  Negative for cough and wheezing  Cardiovascular: Negative for chest pain and palpitations  Gastrointestinal: Negative for constipation, diarrhea, nausea and vomiting  Neurological: Negative for dizziness, light-headedness and headaches  Psychiatric/Behavioral: Negative for dysphoric mood  The patient is not nervous/anxious            Past Medical History:   Diagnosis Date    COPD (chronic obstructive pulmonary disease) (HCC)     Esophageal reflux     Inguinal hernia     Rectal adenocarcinoma (HCC)     Spermatocele          Current Outpatient Medications:     fluticasone-vilanterol (BREO ELLIPTA) 100-25 mcg/inh inhaler, Inhale 1 puff daily, Disp: 3 Inhaler, Rfl: 1    lansoprazole (PREVACID) 30 mg capsule, Take 1 capsule (30 mg total) by mouth daily, Disp: 30 capsule, Rfl: 5    Allergies   Allergen Reactions    Penicillins Hives     Other reaction(s): facial swelling  Reaction Unknown       Social History   Past Surgical History:   Procedure Laterality Date    HERNIA REPAIR      INSERTION PROSTATE RADIATION SEED      [place] INSERT,INFLATABLE PENILE PROSTHESIS N/A 3/8/2016    Procedure: INSERTION OF THREE PIECE PENILE PROSTHESIS;  Surgeon: Derrell De La Rosa MD;  [facility];  Service: Urology    PROSTATE BIOPSY  01/06/2014     Family History   Problem Relation Age of Onset    No Known Problems Mother     No Known Problems Father        Objective:  /78 (BP Location: Right arm, Patient Position: Sitting, Cuff Size: Adult)   Pulse 64 Comment: Ir   Temp (!) 97 3 °F (36 3 °C) (Tympanic) Comment: unable to obtain oraly  Wt 65 7 kg (144 lb 12 8 oz) Comment: shoes on  BMI 22 02 kg/m²        Physical Exam   Constitutional: He is oriented to person, place, and time  He appears well-developed and well-nourished  No distress  HENT:   Head: Normocephalic and atraumatic  Right Ear: Hearing, tympanic membrane, external ear and ear canal normal    Left Ear: Hearing, tympanic membrane, external ear and ear canal normal    Nose: Nose normal    Mouth/Throat: Uvula is midline, oropharynx is clear and moist and mucous membranes are normal    Neck: Neck supple  Cardiovascular: Normal rate, regular rhythm and normal heart sounds  No murmur heard  No pedal edema   Pulmonary/Chest: Effort normal and breath sounds normal  No respiratory distress  He has no wheezes  He has no rales     Lungs decreased throughout   Lymphadenopathy:     He has no cervical adenopathy  Neurological: He is alert and oriented to person, place, and time  No focal deficits   Skin: Skin is warm and dry  No rash noted  Psychiatric: He has a normal mood and affect  His behavior is normal  Judgment and thought content normal    Nursing note and vitals reviewed  No results found for this or any previous visit (from the past 672 hour(s))

## 2019-05-09 ENCOUNTER — OFFICE VISIT (OUTPATIENT)
Dept: INTERNAL MEDICINE CLINIC | Facility: CLINIC | Age: 74
End: 2019-05-09
Payer: COMMERCIAL

## 2019-05-09 VITALS
HEIGHT: 68 IN | SYSTOLIC BLOOD PRESSURE: 120 MMHG | TEMPERATURE: 97.9 F | HEART RATE: 51 BPM | OXYGEN SATURATION: 97 % | WEIGHT: 144.8 LBS | BODY MASS INDEX: 21.95 KG/M2 | DIASTOLIC BLOOD PRESSURE: 78 MMHG

## 2019-05-09 DIAGNOSIS — K21.9 GASTROESOPHAGEAL REFLUX DISEASE, ESOPHAGITIS PRESENCE NOT SPECIFIED: ICD-10-CM

## 2019-05-09 DIAGNOSIS — J43.1 PANLOBULAR EMPHYSEMA (HCC): ICD-10-CM

## 2019-05-09 DIAGNOSIS — Z12.2 ENCOUNTER FOR SCREENING FOR LUNG CANCER: ICD-10-CM

## 2019-05-09 DIAGNOSIS — J44.9 CHRONIC OBSTRUCTIVE PULMONARY DISEASE, UNSPECIFIED COPD TYPE (HCC): ICD-10-CM

## 2019-05-09 DIAGNOSIS — Z12.11 ENCOUNTER FOR SCREENING COLONOSCOPY: ICD-10-CM

## 2019-05-09 DIAGNOSIS — Z00.00 MEDICARE ANNUAL WELLNESS VISIT, SUBSEQUENT: Primary | ICD-10-CM

## 2019-05-09 PROBLEM — K64.9 HEMORRHOIDS: Status: RESOLVED | Noted: 2017-05-06 | Resolved: 2019-05-09

## 2019-05-09 PROBLEM — K57.30 DIVERTICULAR DISEASE OF COLON: Status: RESOLVED | Noted: 2017-05-24 | Resolved: 2019-05-09

## 2019-05-09 PROCEDURE — 1125F AMNT PAIN NOTED PAIN PRSNT: CPT | Performed by: INTERNAL MEDICINE

## 2019-05-09 PROCEDURE — 1170F FXNL STATUS ASSESSED: CPT | Performed by: INTERNAL MEDICINE

## 2019-05-09 PROCEDURE — 1160F RVW MEDS BY RX/DR IN RCRD: CPT | Performed by: INTERNAL MEDICINE

## 2019-05-09 PROCEDURE — 99214 OFFICE O/P EST MOD 30 MIN: CPT | Performed by: INTERNAL MEDICINE

## 2019-05-09 PROCEDURE — 1036F TOBACCO NON-USER: CPT | Performed by: INTERNAL MEDICINE

## 2019-05-09 PROCEDURE — G0438 PPPS, INITIAL VISIT: HCPCS | Performed by: INTERNAL MEDICINE

## 2019-05-09 RX ORDER — FLUTICASONE FUROATE AND VILANTEROL 100; 25 UG/1; UG/1
1 POWDER RESPIRATORY (INHALATION) DAILY
Qty: 3 INHALER | Refills: 1 | Status: SHIPPED | OUTPATIENT
Start: 2019-05-09 | End: 2020-01-29 | Stop reason: SDUPTHER

## 2019-05-13 ENCOUNTER — HOSPITAL ENCOUNTER (OUTPATIENT)
Dept: RADIOLOGY | Facility: IMAGING CENTER | Age: 74
Discharge: HOME | End: 2019-05-13
Payer: COMMERCIAL

## 2019-05-13 DIAGNOSIS — Z12.2 ENCOUNTER FOR SCREENING FOR LUNG CANCER: ICD-10-CM

## 2019-05-15 DIAGNOSIS — R91.8 LUNG NODULES: Primary | ICD-10-CM

## 2019-05-29 RX ORDER — SODIUM CHLORIDE 9 MG/ML
75 INJECTION, SOLUTION INTRAVENOUS CONTINUOUS
Status: CANCELLED | OUTPATIENT
Start: 2019-05-29

## 2019-05-30 ENCOUNTER — TELEPHONE (OUTPATIENT)
Dept: INTERNAL MEDICINE CLINIC | Age: 74
End: 2019-05-30

## 2019-05-31 ENCOUNTER — TRANSCRIBE ORDERS (OUTPATIENT)
Dept: ADMINISTRATIVE | Facility: HOSPITAL | Age: 74
End: 2019-05-31

## 2019-05-31 ENCOUNTER — APPOINTMENT (OUTPATIENT)
Dept: LAB | Facility: IMAGING CENTER | Age: 74
End: 2019-05-31
Payer: COMMERCIAL

## 2019-05-31 DIAGNOSIS — R91.8 LUNG NODULES: Primary | ICD-10-CM

## 2019-05-31 DIAGNOSIS — R91.8 LUNG NODULES: ICD-10-CM

## 2019-05-31 LAB
BUN SERPL-MCNC: 13 MG/DL (ref 5–25)
CREAT SERPL-MCNC: 0.77 MG/DL (ref 0.6–1.3)
GFR SERPL CREATININE-BSD FRML MDRD: 89 ML/MIN/1.73SQ M

## 2019-05-31 PROCEDURE — 36415 COLL VENOUS BLD VENIPUNCTURE: CPT

## 2019-05-31 PROCEDURE — 84520 ASSAY OF UREA NITROGEN: CPT

## 2019-05-31 PROCEDURE — 82565 ASSAY OF CREATININE: CPT

## 2019-06-03 ENCOUNTER — HOSPITAL ENCOUNTER (OUTPATIENT)
Dept: RADIOLOGY | Facility: HOSPITAL | Age: 74
Discharge: HOME | End: 2019-06-03
Attending: INTERNAL MEDICINE

## 2019-06-03 ENCOUNTER — TELEPHONE (OUTPATIENT)
Dept: INTERNAL MEDICINE CLINIC | Age: 74
End: 2019-06-03

## 2019-06-14 ENCOUNTER — TELEPHONE (OUTPATIENT)
Dept: INTERNAL MEDICINE CLINIC | Facility: CLINIC | Age: 74
End: 2019-06-14

## 2019-07-09 ENCOUNTER — HOSPITAL ENCOUNTER (OUTPATIENT)
Dept: RADIOLOGY | Facility: HOSPITAL | Age: 74
Discharge: HOME | End: 2019-07-09
Attending: INTERNAL MEDICINE | Admitting: RADIOLOGY
Payer: COMMERCIAL

## 2019-07-09 VITALS
WEIGHT: 140 LBS | HEIGHT: 68 IN | TEMPERATURE: 97.6 F | OXYGEN SATURATION: 96 % | SYSTOLIC BLOOD PRESSURE: 149 MMHG | HEART RATE: 50 BPM | RESPIRATION RATE: 18 BRPM | BODY MASS INDEX: 21.22 KG/M2 | DIASTOLIC BLOOD PRESSURE: 72 MMHG

## 2019-07-09 DIAGNOSIS — R91.8 LUNG NODULES: ICD-10-CM

## 2019-07-09 LAB
ANION GAP SERPL CALCULATED.3IONS-SCNC: 3 MMOL/L (ref 4–13)
BUN SERPL-MCNC: 18 MG/DL (ref 5–25)
CALCIUM SERPL-MCNC: 8.6 MG/DL (ref 8.3–10.1)
CHLORIDE SERPL-SCNC: 107 MMOL/L (ref 100–108)
CO2 SERPL-SCNC: 28 MMOL/L (ref 21–32)
CREAT SERPL-MCNC: 0.88 MG/DL (ref 0.6–1.3)
ERYTHROCYTE [DISTWIDTH] IN BLOOD BY AUTOMATED COUNT: 14.5 % (ref 11.6–15.1)
GFR SERPL CREATININE-BSD FRML MDRD: 84 ML/MIN/1.73SQ M
GLUCOSE P FAST SERPL-MCNC: 90 MG/DL (ref 65–99)
GLUCOSE SERPL-MCNC: 90 MG/DL (ref 65–140)
HCT VFR BLD AUTO: 45.6 % (ref 36.5–49.3)
HGB BLD-MCNC: 14.9 G/DL (ref 12–17)
INR PPP: 1.07 (ref 0.84–1.19)
MCH RBC QN AUTO: 32.5 PG (ref 26.8–34.3)
MCHC RBC AUTO-ENTMCNC: 32.7 G/DL (ref 31.4–37.4)
MCV RBC AUTO: 99 FL (ref 82–98)
PLATELET # BLD AUTO: 243 THOUSANDS/UL (ref 149–390)
PMV BLD AUTO: 9.7 FL (ref 8.9–12.7)
POTASSIUM SERPL-SCNC: 4.3 MMOL/L (ref 3.5–5.3)
PROTHROMBIN TIME: 13.5 SECONDS (ref 11.6–14.5)
RBC # BLD AUTO: 4.59 MILLION/UL (ref 3.88–5.62)
SODIUM SERPL-SCNC: 138 MMOL/L (ref 136–145)
WBC # BLD AUTO: 7.52 THOUSAND/UL (ref 4.31–10.16)

## 2019-07-09 PROCEDURE — NC001 PR NO CHARGE: Performed by: RADIOLOGY

## 2019-07-09 PROCEDURE — 80048 BASIC METABOLIC PNL TOTAL CA: CPT | Performed by: RADIOLOGY

## 2019-07-09 PROCEDURE — 71250 CT THORAX DX C-: CPT | Performed by: RADIOLOGY

## 2019-07-09 PROCEDURE — 99153 MOD SED SAME PHYS/QHP EA: CPT

## 2019-07-09 PROCEDURE — 99152 MOD SED SAME PHYS/QHP 5/>YRS: CPT

## 2019-07-09 PROCEDURE — 99152 MOD SED SAME PHYS/QHP 5/>YRS: CPT | Performed by: RADIOLOGY

## 2019-07-09 PROCEDURE — 85027 COMPLETE CBC AUTOMATED: CPT | Performed by: RADIOLOGY

## 2019-07-09 PROCEDURE — 77012 CT SCAN FOR NEEDLE BIOPSY: CPT

## 2019-07-09 PROCEDURE — 85610 PROTHROMBIN TIME: CPT | Performed by: RADIOLOGY

## 2019-07-09 RX ORDER — MIDAZOLAM HYDROCHLORIDE 1 MG/ML
INJECTION INTRAMUSCULAR; INTRAVENOUS CODE/TRAUMA/SEDATION MEDICATION
Status: COMPLETED | OUTPATIENT
Start: 2019-07-09 | End: 2019-07-09

## 2019-07-09 RX ORDER — SODIUM CHLORIDE 9 MG/ML
75 INJECTION, SOLUTION INTRAVENOUS CONTINUOUS
Status: DISCONTINUED | OUTPATIENT
Start: 2019-07-09 | End: 2019-07-09 | Stop reason: HOSPADM

## 2019-07-09 RX ORDER — FENTANYL CITRATE 50 UG/ML
INJECTION, SOLUTION INTRAMUSCULAR; INTRAVENOUS CODE/TRAUMA/SEDATION MEDICATION
Status: COMPLETED | OUTPATIENT
Start: 2019-07-09 | End: 2019-07-09

## 2019-07-09 RX ADMIN — SODIUM CHLORIDE 75 ML/HR: 0.9 INJECTION, SOLUTION INTRAVENOUS at 07:23

## 2019-07-09 RX ADMIN — FENTANYL CITRATE 50 MCG: 50 INJECTION, SOLUTION INTRAMUSCULAR; INTRAVENOUS at 08:37

## 2019-07-09 RX ADMIN — MIDAZOLAM 1 MG: 1 INJECTION INTRAMUSCULAR; INTRAVENOUS at 08:36

## 2019-07-09 NOTE — BRIEF OP NOTE (RAD/CATH)
Left lung biopsy canceled due to lesions decreasing in size -  Procedure Note    PATIENT NAME: Stacy Paiz  : 1945  MRN: [id number]     Pre-op Diagnosis:   1  Lung nodules      Post-op Diagnosis:   1  Lung nodules        Surgeon:   Heena Cee MD  Assistants:     No qualified resident was available, Resident is only observing    Estimated Blood Loss: none  Findings:  The pre procedure CT scan showed that most of the lesions in the LLL have decreased in size and therefore, a biopsy was not performed today  Recommend CT scan in 3 months for follow-up to resolution  Discussed this plan with the patient and Dr Jaylen Catalan and the patient will schedule an office visit for follow-up      Specimens:   * No specimens in log *    Complications:  None immediate    Anesthesia: Conscious sedation    Heena Cee MD     Date: 2019  Time: 8:35 AM

## 2019-07-09 NOTE — DISCHARGE INSTRUCTIONS
Needle Biopsy of the Lung    WHAT YOU NEED TO KNOW:  A needle biopsy of the lung is a procedure to remove cells or tissue from your lung  You may have a fine needle aspiration biopsy (FNAB), or a core needle biopsy (CNB)  A FNAB is used to remove cells through a thin needle  CNB uses a thicker needle to remove lung tissue  The samples are collected and tested for inflammation, infection, or cancer  DISCHARGE INSTRUCTIONS:   Resume your normal diet  Small sips of flat soda will help with nausea  Limit your activity for 24 hours  Wound Care:      - Remove band aid in 24 hours      Contact Interventional Radiology at [phone] Cheryl PATIENTS: Contact Interventional Radiology at [phone]) Shyam Klein PATIENTS: Contact Interventional Radiology at [phone]) if any of the following occur:    - You have a fever greater than 101*    - You cough up large amounts of bright red blood     - You have chest pain with breathing    - You have shortness of breath    -You have persistent nausea and vomiting    - You have pus, redness or swelling around your biopsy site    - You have questions or concerns about your condition or care

## 2019-07-09 NOTE — H&P
H&P Exam - Ching Flowers 74 y o  male MRN: [id number]    [place] [facility] [place]ounter: [id number]      Assessment/Plan     Assessment:  New left lower lobe spiculated lung masses    Plan:  CT biopsy left lower lobe lung mass    History of Present Illness   HPI:  Collette Foot is a 74 y o  male who presents with shortness of breath and new left lower lobe spiculated lung masses since previous CT scans of   PCP: Jason Masters MD    Review of Systems    Historical Information   Past Medical History:   Diagnosis Date    COPD (chronic obstructive [facility] disease) [address] [address]     Esophageal reflux     Inguinal hernia     Rectal adenocarcinoma ([facility] )     Spermatocele      Past Surgical History:   Procedure Laterality Date    HERNIA REPAIR      INSERTION PROSTATE RADIATION SEED      [place] INSERT,INFLATABLE PENILE PROSTHESIS N/A 3/8/2016    Procedure: INSERTION OF THREE PIECE PENILE PROSTHESIS;  Surgeon: Beltran Tilley MD;  [facility];  Service: Urology    PROSTATE BIOPSY  2014     Social History   Social History     Substance and Sexual Activity   Alcohol Use Yes    Frequency: 4 or more times a week    Drinks per session: 1 or 2    Binge frequency: Less than monthly     Social History     Substance and Sexual Activity   Drug Use No     Social History     Tobacco Use   Smoking Status Former Smoker    Packs/day: 0 50    Years: 64 00    Pack years: 32 00    Last attempt to quit: 2019    Years since quittin 1   Smokeless Tobacco Never Used     Family History: non-contributory    Meds/Allergies   all medications and allergies reviewed  Allergies   Allergen Reactions    Penicillins Hives     Other reaction(s): facial swelling  Reaction Unknown       Objective   Vitals: Blood pressure 141/69, pulse (!) 53, temperature 97 6 °F (36 4 °C), temperature source Oral, resp  rate 18, height 5' 8" (1 727 m), weight 63 5 kg (140 lb), SpO2 99 %      No intake or output data in the 24 hours ending 07/09/19 0833    Invasive Devices     Peripheral Intravenous Line            Peripheral IV 07/09/19 Left Forearm less than 1 day                Physical Exam    Lab Results: I have personally reviewed pertinent reports  Imaging: I have personally reviewed pertinent reports  EKG, Pathology, and Other Studies: I have personally reviewed pertinent reports  Code Status: No Order  Advance Directive and Living Will: Yes    Power of :    POLST:      Counseling / Coordination of Care  Total floor / unit time spent today 30 minutes  Greater than 50% of total time was spent with the patient and / or family counseling and / or coordination of care  A description of the counseling / coordination of care: including pneumothorax, bleeding, infection and death were explained to the patient

## 2019-09-18 ENCOUNTER — OFFICE VISIT (OUTPATIENT)
Dept: INTERNAL MEDICINE CLINIC | Facility: CLINIC | Age: 74
End: 2019-09-18
Payer: COMMERCIAL

## 2019-09-18 VITALS
SYSTOLIC BLOOD PRESSURE: 142 MMHG | TEMPERATURE: 97.5 F | HEART RATE: 68 BPM | WEIGHT: 147 LBS | DIASTOLIC BLOOD PRESSURE: 70 MMHG | BODY MASS INDEX: 22.35 KG/M2

## 2019-09-18 DIAGNOSIS — J43.1 PANLOBULAR EMPHYSEMA (HCC): Primary | ICD-10-CM

## 2019-09-18 DIAGNOSIS — R91.8 LUNG NODULES: ICD-10-CM

## 2019-09-18 DIAGNOSIS — Z12.2 ENCOUNTER FOR SCREENING FOR LUNG CANCER: ICD-10-CM

## 2019-09-18 DIAGNOSIS — K22.70 BARRETT'S ESOPHAGUS WITHOUT DYSPLASIA: ICD-10-CM

## 2019-09-18 DIAGNOSIS — Z72.0 TOBACCO ABUSE: ICD-10-CM

## 2019-09-18 PROCEDURE — 99214 OFFICE O/P EST MOD 30 MIN: CPT | Performed by: INTERNAL MEDICINE

## 2019-09-18 NOTE — ASSESSMENT & PLAN NOTE
Patient planning on scheduling for EGD in late fall or winter   He is taking Prevacid although he sometimes misses doses

## 2019-09-18 NOTE — ASSESSMENT & PLAN NOTE
Left lower lobe nodule biopsy was cancelled due to diminishing size of nodules   IR recommended repeat CT in 3 months to follow up

## 2019-09-18 NOTE — PROGRESS NOTES
Assessment/Plan:    Tobacco abuse  Patient had quit smoking at previous visit for about 3 weeks  However, he has started smoking again but has cut down to 6 cigarettes or less than half a pack a day  He has not been feeling more SOB than his baseline  He feels that he should quit due to worsening SOB, but he is not sure what he wants to do  Discussed options for quitting    Lung nodules  Left lower lobe nodule biopsy was cancelled due to diminishing size of nodules  IR recommended repeat CT in 3 months to follow up    Chronic obstructive pulmonary disease ([facility] )  Patient's SOB is more at baseline now since previous visit  He feels that Nonnie Senters may be helping    Smith esophagus  Patient planning on scheduling for EGD in late fall or winter       Diagnoses and all orders for this visit:    [address]lobular emphysema ([facility] )    Tobacco abuse    Lung nodules  -     CT chest wo contrast; Future    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        Subjective:   No complains    Patient ID: Stacy Paiz is a 74 y o  male  JACQUELINE Meyer Cha is a 75 yo M who is coming in for a follow up exam today  He had quit smoking at the last visit for about 3 weeks, but he has since started smoking again  He has cut down however, and now smokes about 6 cigarettes or less than half a pack a day  He did go for a lung biopsy, however it was not done because a repeat CT showed diminishing size of the left lower lung nodules  Patient has SOB that is at baseline  He does cough up thick mucus usually, but has not noticed any blood  He has not had any unexpected sudden weight loss, though he has had a gradual weight loss of 20 lbs over about 2 years  He also had a gradual decrease in appetite during this time  Patient is due for a colonoscopy, however he would like to ask about FOBT as an option instead   He does not have diarrhea or constipation, but he does mention that he has to pass stools about 4 to 5 times a morning sometimes and that it is aggravating  On average, he passes 3 stools a day before noon  He feels that eating a lot of fruit and vegetables may be contributing to this  He denies blood in the stool  He occasionally gets heartburn, especially when he missed a dose of prevacid  He denies any throat pain or difficulty swallowing  The following portions of the patient's history were reviewed and updated as appropriate: allergies, current medications, past family history, past medical history, past social history, past surgical history and problem list     Review of Systems   Constitutional: Negative for activity change, appetite change, chills, diaphoresis, fatigue, fever and unexpected weight change  HENT: Negative for sore throat and tinnitus  Eyes: Negative for visual disturbance  Respiratory: Positive for cough and shortness of breath  Negative for chest tightness and wheezing  Cardiovascular: Negative for chest pain, palpitations and leg swelling  Gastrointestinal: Negative for abdominal pain, blood in stool, constipation, diarrhea, nausea and vomiting  Genitourinary: Positive for difficulty urinating  Negative for dysuria, frequency and hematuria  Musculoskeletal: Negative for arthralgias, back pain and myalgias  Neurological: Negative for dizziness, tremors, weakness, light-headedness, numbness and headaches  Psychiatric/Behavioral: Negative for confusion, decreased concentration and dysphoric mood  Objective:      /70 (BP Location: Left arm, Patient Position: Sitting, Cuff Size: Standard)   Pulse 68   Temp 97 5 °F (36 4 °C)   Wt 66 7 kg (147 lb) Comment: boots on  BMI 22 35 kg/m²          Physical Exam   Constitutional: He is oriented to person, place, and time  He appears well-developed and well-nourished  No distress  HENT:   Head: Normocephalic and atraumatic  Cardiovascular: Normal rate, regular rhythm, normal heart sounds and intact distal pulses     Pulmonary/Chest: Effort normal and breath sounds normal    Decreased breath sounds over all lung fields   Abdominal: Soft  Bowel sounds are normal  He exhibits no distension  There is no tenderness  Musculoskeletal: He exhibits no edema, tenderness or deformity  Neurological: He is alert and oriented to person, place, and time  Skin: He is not diaphoretic

## 2019-09-18 NOTE — ASSESSMENT & PLAN NOTE
Patient had quit smoking at previous visit for about 3 weeks  However, he has started smoking again but has cut down to 6 cigarettes or less than half a pack a day  He has not been feeling more SOB than his baseline  He feels that he should quit due to worsening SOB, but he is not sure what he wants to do   Discussed options for quitting

## 2019-10-04 DIAGNOSIS — K21.9 GASTROESOPHAGEAL REFLUX DISEASE, ESOPHAGITIS PRESENCE NOT SPECIFIED: ICD-10-CM

## 2019-10-04 DIAGNOSIS — K22.70 BARRETT'S ESOPHAGUS WITHOUT DYSPLASIA: ICD-10-CM

## 2019-10-15 RX ORDER — LANSOPRAZOLE 30 MG/1
CAPSULE, DELAYED RELEASE ORAL
Qty: 30 CAPSULE | Refills: 5 | Status: SHIPPED | OUTPATIENT
Start: 2019-10-15 | End: 2019-10-21 | Stop reason: SDUPTHER

## 2019-10-21 ENCOUNTER — HOSPITAL ENCOUNTER (OUTPATIENT)
Dept: RADIOLOGY | Facility: IMAGING CENTER | Age: 74
Discharge: HOME | End: 2019-10-21
Payer: COMMERCIAL

## 2019-10-21 ENCOUNTER — OFFICE VISIT (OUTPATIENT)
Dept: INTERNAL MEDICINE CLINIC | Facility: CLINIC | Age: 74
End: 2019-10-21
Payer: COMMERCIAL

## 2019-10-21 VITALS
HEART RATE: 42 BPM | OXYGEN SATURATION: 96 % | SYSTOLIC BLOOD PRESSURE: 136 MMHG | TEMPERATURE: 98.9 F | HEIGHT: 68 IN | BODY MASS INDEX: 22.55 KG/M2 | WEIGHT: 148.8 LBS | DIASTOLIC BLOOD PRESSURE: 60 MMHG

## 2019-10-21 DIAGNOSIS — K21.9 GASTROESOPHAGEAL REFLUX DISEASE, ESOPHAGITIS PRESENCE NOT SPECIFIED: ICD-10-CM

## 2019-10-21 DIAGNOSIS — Z72.0 TOBACCO ABUSE: ICD-10-CM

## 2019-10-21 DIAGNOSIS — M25.551 RIGHT HIP PAIN: ICD-10-CM

## 2019-10-21 DIAGNOSIS — K22.70 BARRETT'S ESOPHAGUS WITHOUT DYSPLASIA: ICD-10-CM

## 2019-10-21 DIAGNOSIS — M25.551 RIGHT HIP PAIN: Primary | ICD-10-CM

## 2019-10-21 PROCEDURE — 73502 X-RAY EXAM HIP UNI 2-3 VIEWS: CPT

## 2019-10-21 PROCEDURE — 99213 OFFICE O/P EST LOW 20 MIN: CPT | Performed by: NURSE PRACTITIONER

## 2019-10-21 RX ORDER — LANSOPRAZOLE 30 MG/1
30 CAPSULE, DELAYED RELEASE ORAL DAILY
Qty: 30 CAPSULE | Refills: 5 | Status: SHIPPED | OUTPATIENT
Start: 2019-10-21 | End: 2022-05-17

## 2019-10-21 NOTE — PATIENT INSTRUCTIONS
Get xray done of your hip  Start taking tylenol for your hip pain  Follow up on results in a few days

## 2019-10-21 NOTE — PROGRESS NOTES
Assessment/Plan:       Problem List Items Addressed This Visit        Digestive    Esophageal reflux    Relevant Medications    lansoprazole (PREVACID) 30 mg capsule    Smith esophagus    Relevant Medications    lansoprazole (PREVACID) 30 mg capsule       Other    Tobacco abuse     Counseled on quitting smoking 3-10 minutes  Right hip pain - Primary     Will check x-ray of right hip, take Tylenol for pain  Follow-up to review results in 1 week or sooner if needed  Relevant Orders    XR hip/pelv 2-3 vws right if performed              Tobacco Cessation Counseling: Tobacco cessation counseling was provided  The patient is sincerely urged to quit consumption of tobacco  He is not ready to quit tobacco  Medication options and side effects of medication discussed  Patient refused medication  Tobacco use screening or tobacco cessation counseling was not performed due to other medical reasons  Patient admits that he is trying to cut back but is still not interested in quitting  Subjective:      Patient ID: Isaiah Moore is a 74 y o  male  Patient presents for an acute visit  He reports that his right hip has become progressively more painful over the past month  He reports that his range of motion has been decreasing  He notes it when he tries to bend down to put on his shoes and trim his toe nails  He denies any known injury to the hip  Hip Pain    The incident occurred more than 1 week ago  There was no injury mechanism  The pain is present in the right hip  The quality of the pain is described as aching  The pain is mild  Pain course: only when he is bending down while sitting  Pertinent negatives include no inability to bear weight, loss of motion, loss of sensation, muscle weakness, numbness or tingling  The symptoms are aggravated by movement  He has tried NSAIDs (He tried aleve yesterday, when asked if it helped " I don't really know"  ) for the symptoms         The following portions of the patient's history were reviewed and updated as appropriate: allergies, current medications, past family history, past medical history, past social history, past surgical history and problem list     Review of Systems   Constitutional: Negative for chills and fever  HENT: Negative for trouble swallowing  Respiratory: Negative for shortness of breath and wheezing  Cardiovascular: Negative for chest pain, palpitations and leg swelling  Gastrointestinal: Negative for abdominal pain, diarrhea, nausea and vomiting  Genitourinary: Negative for dysuria  Musculoskeletal: Positive for gait problem (intermittently when he stands up after sitting)  Per HPI   Skin: Negative for rash and wound  Neurological: Negative for dizziness, tingling, syncope, numbness and headaches  Psychiatric/Behavioral: Negative for confusion and sleep disturbance  The patient is not nervous/anxious            Past Medical History:   Diagnosis Date    COPD (chronic obstructive pulmonary disease) (HCC)     Esophageal reflux     Inguinal hernia     Rectal adenocarcinoma (HCC)     Spermatocele          Current Outpatient Medications:     fluticasone-vilanterol (BREO ELLIPTA) 100-25 mcg/inh inhaler, Inhale 1 puff daily, Disp: 3 Inhaler, Rfl: 1    lansoprazole (PREVACID) 30 mg capsule, Take 1 capsule (30 mg total) by mouth daily, Disp: 30 capsule, Rfl: 5    Allergies   Allergen Reactions    Penicillins Hives     Other reaction(s): facial swelling  Reaction Unknown       Social History   Past Surgical History:   Procedure Laterality Date    CT NEEDLE BIOPSY LUNG  7/9/2019    HERNIA REPAIR      INSERTION PROSTATE RADIATION SEED      CT INSERT,INFLATABLE PENILE PROSTHESIS N/A 3/8/2016    Procedure: INSERTION OF THREE PIECE PENILE PROSTHESIS;  Surgeon: Dwight Correa MD;  [facility];  Service: Urology    PROSTATE BIOPSY  01/06/2014     Family History   Problem Relation Age of Onset    No Known Problems Mother     No Known Problems Father        Objective:  /60 (BP Location: Left arm, Patient Position: Sitting, Cuff Size: Adult)   Pulse (!) 42   Temp 98 9 °F (37 2 °C) (Oral)   Ht 5' 8" (1 727 m)   Wt 67 5 kg (148 lb 12 8 oz)   SpO2 96%   BMI 22 62 kg/m²        Physical Exam   Constitutional: He is oriented to person, place, and time  He appears well-developed and well-nourished  No distress  HENT:   Head: Normocephalic and atraumatic  Right Ear: External ear normal    Left Ear: External ear normal    Eyes: Pupils are equal, round, and reactive to light  No scleral icterus  Neck: Normal range of motion  Neck supple  Cardiovascular: Normal rate and regular rhythm  Pulmonary/Chest: Effort normal and breath sounds normal    Abdominal: Soft  Musculoskeletal: He exhibits no edema  Right hip: He exhibits decreased range of motion  He exhibits normal strength, no tenderness, no bony tenderness, no swelling, no crepitus, no deformity and no laceration  Neurological: He is alert and oriented to person, place, and time  Skin: Skin is warm  Psychiatric: He has a normal mood and affect   His behavior is normal

## 2019-10-21 NOTE — ASSESSMENT & PLAN NOTE
Will check x-ray of right hip, take Tylenol for pain  Follow-up to review results in 1 week or sooner if needed

## 2019-10-28 ENCOUNTER — HOSPITAL ENCOUNTER (OUTPATIENT)
Dept: RADIOLOGY | Age: 74
Discharge: HOME | End: 2019-10-28
Payer: COMMERCIAL

## 2019-10-28 DIAGNOSIS — R91.8 LUNG NODULES: ICD-10-CM

## 2019-10-28 PROCEDURE — 71250 CT THORAX DX C-: CPT

## 2019-10-29 ENCOUNTER — OFFICE VISIT (OUTPATIENT)
Dept: INTERNAL MEDICINE CLINIC | Facility: CLINIC | Age: 74
End: 2019-10-29
Payer: COMMERCIAL

## 2019-10-29 VITALS
DIASTOLIC BLOOD PRESSURE: 70 MMHG | BODY MASS INDEX: 22.28 KG/M2 | SYSTOLIC BLOOD PRESSURE: 128 MMHG | HEIGHT: 68 IN | HEART RATE: 64 BPM | TEMPERATURE: 97.7 F | WEIGHT: 147 LBS

## 2019-10-29 DIAGNOSIS — M25.551 RIGHT HIP PAIN: ICD-10-CM

## 2019-10-29 DIAGNOSIS — M16.11 OSTEOARTHRITIS OF ONE HIP, RIGHT: Primary | ICD-10-CM

## 2019-10-29 PROCEDURE — 99213 OFFICE O/P EST LOW 20 MIN: CPT | Performed by: NURSE PRACTITIONER

## 2019-10-29 NOTE — ASSESSMENT & PLAN NOTE
Discussed with patient that based on his symptoms and x-ray findings that he should begin to take Tylenol and start Physical Therapy for muscle strengthening and stretching exercises  Patient seemed agreeable to this plan  Will consider referral to ortho if pain worsens or if he is refractory to treatment

## 2019-10-29 NOTE — PATIENT INSTRUCTIONS
Take tylenol for your pain in your hip  Go to physical therapy to help strengthen your hip  Follow up if your pain worsens      Osteoarthritis   AMBULATORY CARE:   Osteoarthritis  occurs when cartilage (tissue that cushions a joint) wears away slowly and causes the bones to rub together  Osteoarthritis (OA) is a long-term condition that often affects the hands, neck, lower back, knees, and hips  OA is also called arthrosis or degenerative joint disease  Common signs and symptoms include the following:   · Joint pain that gets worse when you move the joint     · Joint stiffness that decreases after you move the joint     · Decreased range of movement     · Hard, bony enlargement on your fingers or toes    · A grinding or cracking sound when you move your joint  Seek care immediately if:   · You have severe pain  · You cannot move your joint  Contact your healthcare provider if:   · You have a fever  · Your joint is red and tender  · You have questions or concerns about your condition or care  Treatment for osteoarthritis  may include any of the following:  · Acetaminophen  is used to decrease pain  It is available without a doctor's order  Ask how much to take and how often to take it  Follow directions  Acetaminophen can cause liver damage if not taken correctly  · NSAIDs , such as ibuprofen, help decrease swelling, pain, and fever  This medicine is available with or without a doctor's order  NSAIDs can cause stomach bleeding or kidney problems in certain people  If you take blood thinner medicine, always ask your healthcare provider if NSAIDs are safe for you  Always read the medicine label and follow directions  · Capsaicin cream  may help decrease pain in your joint  · Prescription pain medicine  may be given to decrease severe pain if other medicines do not work  Take the medicine as directed  Do not wait until the pain is severe before you take your medicine      · A steroid injection  may be given if your symptoms get worse  · Physical therapy  is used to teach you exercises to help improve movement and strength, and to decrease pain  · Surgery  may be needed if other treatments do not work  Manage osteoarthritis   · Stay active  Physical activity may reduce your pain and improve your ability to do daily activities  Avoid activities that cause pain  Ask your healthcare provider what type of exercise would be best for you  · Maintain a healthy weight  This helps decrease the strain on the joints in your back, hips, knees, ankles, and feet  Ask your healthcare provider how much you should weigh  Ask him to help you create a weight loss plan if you are overweight  · Use heat or ice  on your joints as directed  Heat and ice help decrease pain, swelling, and muscle spasms  Use a heating pad on a low setting or take a warm bath  Use an ice pack, or put crushed ice in a plastic bag  Cover it with a towel  · Massage  the muscles around the joint to relieve pain and stiffness  · Use a cane, crutches, or a walker  to protect and relieve pressure on your ankle, knee, and hip joints  You may also be prescribed shoe inserts to decrease pressure in your joints  · Wear flat or low-heeled shoes  This will help decrease pain and reduce pressure on your ankle, knee, and hip joints  Follow up with your healthcare provider as directed:  Write down your questions so you remember to ask them during your visits  © 2017 [employer] Information is for End User's use only and may not be sold, redistributed or otherwise used for commercial purposes  All illustrations and images included in CareNotes® are the copyrighted property of [employer]  or [employer] Jim[employer]  The above information is an  only  It is not intended as medical advice for individual conditions or treatments   Talk to your doctor, nurse or pharmacist before following any medical regimen to see if it is safe and effective for you

## 2019-10-29 NOTE — PROGRESS NOTES
Assessment/Plan:    Problem List Items Addressed This Visit        Musculoskeletal and Integument    Osteoarthritis of one hip, right - Primary      Discussed with patient that based on his symptoms and x-ray findings that he should begin to take Tylenol and start Physical Therapy for muscle strengthening and stretching exercises  Patient seemed agreeable to this plan  Will consider referral to ortho if pain worsens or if he is refractory to treatment  Relevant Orders    Ambulatory referral to Physical Therapy       Other    Right hip pain    Relevant Orders    Ambulatory referral to Physical Therapy            Tobacco Cessation Counseling: Tobacco cessation counseling was provided  The patient is sincerely urged to quit consumption of tobacco  He is not ready to quit tobacco  Patient refused medication  M*Modal software was used to dictate this note  It may contain errors with dictating incorrect words or incorrect spelling  Please contact the provider directly with any questions  Subjective:      Patient ID: Chino Conner is a 74 y o  male  Patient presents today for a 1 week follow up on right hip pain that he has been having for the past month  He has pain and stiffness with forward bending  His pain rated at a 5/10 when present and is only present with forward flexion and prolonged sitting  Patient has not taken anything for his pain  He is mostly bothered in the morning when bending to put on socks and shoes or to clip his toenails  This is unchanged since he was seen last week  He had an xray done and it shows  moderate to severe osteoarthritis  Patient is here for a follow up for his right hip pain  Onset is about 1 month  He has pain and stiffness with forward bending  His pain rated at a 5/10 when present and is only present with forward flexion and prolonged sitting  Patient has not taken anything for his pain   He is mostly bothered in the morning when bending to put on socks and shoes or to clip his toenails  X-ray showed moderate to severe osteoarthritis  Discussed with patient that based on his symptoms and x-ray findings that he should begin to take Tylenol and start Physical Therapy for muscle strengthening and stretching exercises  Patient seemed agreeable to this plan  The following portions of the patient's history were reviewed and updated as appropriate: allergies, current medications, past family history, past medical history, past social history, past surgical history and problem list     Review of Systems   Constitutional: Negative for chills and fever  HENT: Negative for trouble swallowing  Respiratory: Negative for shortness of breath  Cardiovascular: Negative for chest pain  Gastrointestinal: Negative for nausea and vomiting  Musculoskeletal: Negative for gait problem  Per HPI   Skin: Negative for rash  Neurological: Negative for dizziness and headaches  Psychiatric/Behavioral: The patient is not nervous/anxious            Past Medical History:   Diagnosis Date    COPD (chronic obstructive pulmonary disease) (HCC)     Esophageal reflux     Inguinal hernia     Rectal adenocarcinoma (HCC)     Spermatocele          Current Outpatient Medications:     fluticasone-vilanterol (BREO ELLIPTA) 100-25 mcg/inh inhaler, Inhale 1 puff daily, Disp: 3 Inhaler, Rfl: 1    lansoprazole (PREVACID) 30 mg capsule, Take 1 capsule (30 mg total) by mouth daily, Disp: 30 capsule, Rfl: 5    Allergies   Allergen Reactions    Penicillins Hives     Other reaction(s): facial swelling  Reaction Unknown       Social History   Past Surgical History:   Procedure Laterality Date    CT NEEDLE BIOPSY LUNG  7/9/2019    HERNIA REPAIR      INSERTION PROSTATE RADIATION SEED      MN INSERT,INFLATABLE PENILE PROSTHESIS N/A 3/8/2016    Procedure: INSERTION OF THREE PIECE PENILE PROSTHESIS;  Surgeon: Edwige Santiago MD;  Location: [facility];  Service: Urology   [facility] PROSTATE BIOPSY  01/06/2014     Family History   Problem Relation Age of Onset    No Known Problems Mother     No Known Problems Father        Objective:  /70 (BP Location: Left arm, Patient Position: Sitting, Cuff Size: Standard)   Pulse 64   Temp 97 7 °F (36 5 °C) (Oral)   Ht 5' 8" (1 727 m)   Wt 66 7 kg (147 lb)   BMI 22 35 kg/m²      Physical Exam   Constitutional: He is oriented to person, place, and time  He appears well-developed and well-nourished  No distress  HENT:   Head: Normocephalic and atraumatic  Mouth/Throat: No oropharyngeal exudate  Eyes: Pupils are equal, round, and reactive to light  No scleral icterus  Neck: Normal range of motion  Neck supple  Cardiovascular: Normal rate and regular rhythm  Pulmonary/Chest: Effort normal and breath sounds normal    Abdominal: Soft  Musculoskeletal:        Legs:  Lymphadenopathy:     He has no cervical adenopathy  Neurological: He is alert and oriented to person, place, and time  Skin: Skin is warm  Psychiatric: He has a normal mood and affect   His behavior is normal

## 2019-11-06 ENCOUNTER — EVALUATION (OUTPATIENT)
Dept: PHYSICAL THERAPY | Facility: REHABILITATION | Age: 74
End: 2019-11-06
Payer: COMMERCIAL

## 2019-11-06 DIAGNOSIS — M25.551 RIGHT HIP PAIN: Primary | ICD-10-CM

## 2019-11-06 PROCEDURE — 97161 PT EVAL LOW COMPLEX 20 MIN: CPT | Performed by: PHYSICAL THERAPIST

## 2019-11-06 PROCEDURE — 97140 MANUAL THERAPY 1/> REGIONS: CPT | Performed by: PHYSICAL THERAPIST

## 2019-11-06 NOTE — PROGRESS NOTES
PT Evaluation   Assessment/Plan  Subjective    Today's date: 2019  Patient name: Vamsi Sky  : 1945  MRN: [id number]  Referring provider: RAPHAEL Max  Dx:   Encounter Diagnosis   Name Primary? 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 which is getting more difficult  He reports that he is stiff all over every morning and it take about an hour to loosen up  Pain level ranges from 0/10 to 8/10  X-ray on 10/21/19 indicates moderate hip OA  Pt  Presents with signs and symptoms consistent with right hip OA which is exacerbated by shorted soft tissue structures t/o his hip girdle and thigh  I believe he will feel much better with some targeted stretches and mobilizations  I would like to see him do some light cardiovascular exercises as well  He has COPD related to smoking and so will have some challenges with cardiovascular exercising  We discussed his smoking and he is trying to cut back but appears resigned to the fact that he can not quit  Objective     Right hip AROM:  Flexion = 105  Abduction  = 30  ER = 20  IR = 10    Right hip PROM:  Flexion = 110  Abduction = 35  ER = 30  IR = 10    MMT 5/5 throughout his hip and lower leg    Remarkably tight quads and ITB and piriformis on right  Moderately tight quads on left  Moderately tight hamstrings bilaterally    Sensation intact  Reflexion 2+ patella and achilles bilaterally      Positive right hip grind test, Fabers, Fadirs and Stinchfield test    STG:  Pain level not greater than 1/10  Independent with HEP    LTG:  Pt  Will be able to lean over to tie his shoes with minimal right hip pain  Pt   Will be 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 with minimal right hip pain    Precautions: COPD    Daily Treatment Diary     Assessment 11/6            Eval/Reval             FOTO     **     **   POC Signed             HEP Issued              Manuals             Manual right leg pull MW            Grade IV distraction hip mobs MW            PROM right hip - pain free MW            Manual quad stretch MW            Manual hamstring stretch MW            Manual adductor stretch MW            STM right piriformis MW                         Exercise Diary              Bike nv                                                                                                                                                                                                               Modalities                            X=performed                  Earl Askew PT  11/6/2019,7:32 AM

## 2019-11-07 DIAGNOSIS — Z12.11 SCREENING FOR MALIGNANT NEOPLASM OF COLON: Primary | ICD-10-CM

## 2019-11-07 DIAGNOSIS — Z86.010 HX OF COLONIC POLYPS: ICD-10-CM

## 2019-11-11 ENCOUNTER — OFFICE VISIT (OUTPATIENT)
Dept: PHYSICAL THERAPY | Facility: REHABILITATION | Age: 74
End: 2019-11-11
Payer: COMMERCIAL

## 2019-11-11 DIAGNOSIS — M25.551 RIGHT HIP PAIN: Primary | ICD-10-CM

## 2019-11-11 PROCEDURE — 97110 THERAPEUTIC EXERCISES: CPT

## 2019-11-11 PROCEDURE — 97140 MANUAL THERAPY 1/> REGIONS: CPT

## 2019-11-11 NOTE — PROGRESS NOTES
Daily Note     Today's date: 2019  Patient name: Kathy Jama  : 1945  MRN: [id number]  Referring provider: ANDRÉS Tavera*  Dx:   Encounter Diagnosis     ICD-10-CM    1  Right hip pain M25 551                   Subjective: Patient stated he felt a little sore after last therapy session but wasn't anything remarkable  Objective: See treatment diary below      Assessment: Tolerated treatment fair  Patient would benefit from continued PT      Plan: Continue per plan of care           Daily Treatment Diary      Assessment                    Eval/Reval                       FOTO         **         **   POC Signed                       HEP Issued                        Manuals                       Manual right leg pull MW [facility]                   Grade IV distraction hip mobs                      PROM right hip - pain free [facility]                   Manual quad stretch MW [facility]                   Manual hamstring stretch MW [facility]                   Manual adductor stretch [facility]                    STM right piriformis MW                                             Exercise Diary                        Bike nv [facility]                    Treadmill   BH                                                                                                                                                                                                                                                                                                                                                                   Modalities                                                  X=performed

## 2019-11-13 ENCOUNTER — TELEPHONE (OUTPATIENT)
Dept: INTERNAL MEDICINE CLINIC | Facility: CLINIC | Age: 74
End: 2019-11-13

## 2019-11-13 NOTE — TELEPHONE ENCOUNTER
Called patient and asked him to call insurance to double check coverage for his cologuard test bc hx of polyps  Patient did not want to call insurance but get the cologuard done

## 2019-11-14 ENCOUNTER — OFFICE VISIT (OUTPATIENT)
Dept: PHYSICAL THERAPY | Facility: REHABILITATION | Age: 74
End: 2019-11-14
Payer: COMMERCIAL

## 2019-11-14 DIAGNOSIS — M25.551 RIGHT HIP PAIN: Primary | ICD-10-CM

## 2019-11-14 PROCEDURE — 97140 MANUAL THERAPY 1/> REGIONS: CPT

## 2019-11-14 PROCEDURE — 97110 THERAPEUTIC EXERCISES: CPT

## 2019-11-14 NOTE — PROGRESS NOTES
Daily Note     Today's date: 2019  Patient name: Chino Conner  : 1945  MRN: [id number]  Referring provider: ANDRÉS Gotti*  Dx:   Encounter Diagnosis     ICD-10-CM    1  Right hip pain M25 551                   Subjective:  Patient noted that he is feeling better he was able to put his sock on with bending forward  Objective: See treatment diary below      Assessment: Patient responded well to manual treatment was able to bend forward with less discomfort post manual treatment  Patient also had no pain or complaints with treadmill or bike today  Plan: Continue per plan of care       Daily Treatment Diary      Assessment                  Eval/Reval                       FOTO         **         **   POC Signed                       HEP Issued                        Manuals                       Manual right leg pull [facility]  af                 Grade IV distraction hip mobs     np                 PROM right hip - pain free [facility]  af                 Manual quad stretch [facility]  af                 Manual hamstring stretch [facility]  af                 Manual adductor stretch [facility]   af                 STM right piriformis [facility]                                         Exercise Diary                        Bike nv [facility]  10'                  Treadmill   BH  5'                                                                                                                                                                                                                                                                                                                                                                 Modalities                                                  X=performed

## 2019-11-19 ENCOUNTER — OFFICE VISIT (OUTPATIENT)
Dept: PHYSICAL THERAPY | Facility: REHABILITATION | Age: 74
End: 2019-11-19
Payer: COMMERCIAL

## 2019-11-19 DIAGNOSIS — M25.551 RIGHT HIP PAIN: Primary | ICD-10-CM

## 2019-11-19 PROCEDURE — 97140 MANUAL THERAPY 1/> REGIONS: CPT | Performed by: PHYSICAL THERAPIST

## 2019-11-19 PROCEDURE — 97110 THERAPEUTIC EXERCISES: CPT | Performed by: PHYSICAL THERAPIST

## 2019-11-19 NOTE — PROGRESS NOTES
Daily Note     Today's date: 2019  Patient name: Fred Chen  : 1945  MRN: [id number]  Referring provider: RAPHAEL Nelson  Dx:   Encounter Diagnosis     ICD-10-CM    1  Right hip pain M25 551                   Subjective:  Patient reports that he was a little sore after last session  It was sore for three days  Not sure if it was from working on cars or from therapy but its been worse since starting therapy  Objective: See treatment diary below      Assessment: Took it easy today with most aggressive work on stretching out his quads  His quads are very tight  Go more gentle if he gets too sore again  Plan: Continue per plan of care       Daily Treatment Diary      Assessment                Eval/Reval                       FOTO         **         **   POC Signed                       HEP Issued                        Manuals                       Manual right leg pull MW [facility]  af  MW               Grade IV distraction hip mobs MW    np  MW               PROM right hip - pain free [facility]  af  MW               Manual quad stretch [facility]  af  MW               Manual hamstring stretch [facility]  af  MW               Manual adductor stretch [facility]   af  MW               STM right piriformis MW    af  np                                       Exercise Diary                        Bike nv [facility]  10'  10 min                Treadmill   BH  5'                                          Standing quad stretch foot on chair       30"x2                                                                                                                                                                                                                                                                                                               Modalities                                                  X=performed

## 2019-11-21 ENCOUNTER — OFFICE VISIT (OUTPATIENT)
Dept: PHYSICAL THERAPY | Facility: REHABILITATION | Age: 74
End: 2019-11-21
Payer: COMMERCIAL

## 2019-11-21 DIAGNOSIS — M25.551 RIGHT HIP PAIN: Primary | ICD-10-CM

## 2019-11-21 PROCEDURE — 97110 THERAPEUTIC EXERCISES: CPT

## 2019-11-21 NOTE — PROGRESS NOTES
Daily Note     Today's date: 2019  Patient name: Joseline Jacobson  : 1945  MRN: [id number]  Referring provider: ANDRÉS Maloney*  Dx:   Encounter Diagnosis     ICD-10-CM    1  Right hip pain M25 551                   Subjective: Patient noted minimal to no change in hip since starting therapy  Patient noted no change form start of visit to end of visit  Objective: See treatment diary below      Assessment: Tolerated treatment fair  Patient instructed and demonstrated s/l quad stretch today for HEP instead of standing quad stretch patient unable to perform correctly even with vc  Therefore demonstrated and had patient perform prone quad stretch for home  Patient responded with no change post manual treatment today  Plan: Patient  Discussed with primary therapist and noted that he wanted to make today his last therapy appointment and be d/c to HEP             Assessment              Eval/Reval                       FOTO         **x         **   POC Signed                       HEP Issued                        Manuals                       Manual right leg pull [facility]  af  MW               Grade IV distraction hip mobs MW    np  MW               PROM right hip - pain free [facility]  af  MW  af             Manual quad stretch [facility]  af  MW  af             Manual hamstring stretch [facility]  af  MW  af             Manual adductor stretch [facility]   af  MW  af             STM right piriformis     af  np                                       Exercise Diary                        Bike nv [facility]  10'  10 min  10 min              Treadmill   BH  5'                                          Standing quad stretch foot on chair       30"x2  S/L quad stretch 30"x2                                                                                                                                                                                                                                                                                                             Modalities                                                  X=performed

## 2019-12-10 ENCOUNTER — TELEPHONE (OUTPATIENT)
Dept: INTERNAL MEDICINE CLINIC | Facility: CLINIC | Age: 74
End: 2019-12-10

## 2019-12-11 NOTE — TELEPHONE ENCOUNTER
I called the patient and answered his questions re: the cologuard test   He has the contact information for GI and he stated that he will call to Schedule

## 2019-12-12 ENCOUNTER — TRANSCRIBE ORDERS (OUTPATIENT)
Dept: ADMINISTRATIVE | Facility: HOSPITAL | Age: 74
End: 2019-12-12

## 2020-01-09 DIAGNOSIS — J44.9 CHRONIC OBSTRUCTIVE PULMONARY DISEASE, UNSPECIFIED COPD TYPE (HCC): ICD-10-CM

## 2020-01-09 RX ORDER — FLUTICASONE FUROATE AND VILANTEROL TRIFENATATE 100; 25 UG/1; UG/1
POWDER RESPIRATORY (INHALATION)
Refills: 0 | OUTPATIENT
Start: 2020-01-09

## 2020-01-29 ENCOUNTER — OFFICE VISIT (OUTPATIENT)
Dept: INTERNAL MEDICINE CLINIC | Facility: CLINIC | Age: 75
End: 2020-01-29
Payer: COMMERCIAL

## 2020-01-29 VITALS
HEIGHT: 68 IN | BODY MASS INDEX: 22.31 KG/M2 | OXYGEN SATURATION: 98 % | WEIGHT: 147.2 LBS | HEART RATE: 56 BPM | TEMPERATURE: 97.1 F | DIASTOLIC BLOOD PRESSURE: 74 MMHG | SYSTOLIC BLOOD PRESSURE: 140 MMHG

## 2020-01-29 DIAGNOSIS — Z72.0 TOBACCO ABUSE: ICD-10-CM

## 2020-01-29 DIAGNOSIS — K22.70 BARRETT'S ESOPHAGUS WITHOUT DYSPLASIA: Primary | ICD-10-CM

## 2020-01-29 DIAGNOSIS — J44.9 CHRONIC OBSTRUCTIVE PULMONARY DISEASE, UNSPECIFIED COPD TYPE (HCC): ICD-10-CM

## 2020-01-29 DIAGNOSIS — C61 ADENOCARCINOMA OF PROSTATE (HCC): ICD-10-CM

## 2020-01-29 DIAGNOSIS — R91.8 LUNG NODULES: ICD-10-CM

## 2020-01-29 PROCEDURE — 4004F PT TOBACCO SCREEN RCVD TLK: CPT | Performed by: INTERNAL MEDICINE

## 2020-01-29 PROCEDURE — 1160F RVW MEDS BY RX/DR IN RCRD: CPT | Performed by: INTERNAL MEDICINE

## 2020-01-29 PROCEDURE — 3725F SCREEN DEPRESSION PERFORMED: CPT | Performed by: INTERNAL MEDICINE

## 2020-01-29 PROCEDURE — 99214 OFFICE O/P EST MOD 30 MIN: CPT | Performed by: INTERNAL MEDICINE

## 2020-01-29 RX ORDER — FLUTICASONE FUROATE AND VILANTEROL 100; 25 UG/1; UG/1
1 POWDER RESPIRATORY (INHALATION) DAILY
Qty: 3 INHALER | Refills: 1 | Status: SHIPPED | OUTPATIENT
Start: 2020-01-29 | End: 2020-07-15 | Stop reason: SDUPTHER

## 2020-01-29 NOTE — PROGRESS NOTES
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  Patient ID: Margaret Davidson is a 66 y o  male  HPI    Patient is a 67 yo male here for a follow up for Msith esophagus  He has no concerns today  Patient is currently taking lansoprazole once a day and his acid reflux is well controlled  He will follow up EGD in 05/2020    Patient recently had a colonoscopy done showing 7 colon polyps, tubular adenoma with no signs of malignancy and hyperplastic polyps which were removed  Patient uses breo ellipta once a day for COPD  Patient's SOB is at baseline  Patient still smokes and notices that SOB gets worse during use  Patient reports he is smoking less, about 6-7 cigarettes/ day  He is interested in decreasing the amount of smoking but not stopping entirely  Review of Systems   Constitutional: Negative for chills, fatigue, fever and unexpected weight change  HENT: Negative for ear pain, postnasal drip, rhinorrhea and sore throat  Eyes: Negative  Respiratory: Positive for cough and shortness of breath  Negative for chest tightness  Cardiovascular: Negative for chest pain and palpitations  Gastrointestinal: Negative for abdominal pain, constipation, diarrhea, nausea and vomiting  Endocrine: Negative  Genitourinary: Negative for difficulty urinating, frequency and urgency     Musculoskeletal: Negative for arthralgias, back pain, myalgias and neck pain  Skin: Negative  Allergic/Immunologic: Negative  Neurological: Negative for tremors, weakness, numbness and headaches  Hematological: Negative  Psychiatric/Behavioral: Negative  Objective:      /74 (BP Location: Left arm, Patient Position: Sitting, Cuff Size: Standard)   Pulse 56   Temp (!) 97 1 °F (36 2 °C) (Tympanic)   Ht 5' 8" (1 727 m)   Wt 66 8 kg (147 lb 3 2 oz)   SpO2 98%   BMI 22 38 kg/m²          Physical Exam   Constitutional: He is oriented to person, place, and time  He appears well-developed and well-nourished  HENT:   Head: Normocephalic and atraumatic  Eyes: Pupils are equal, round, and reactive to light  Conjunctivae and EOM are normal    Neck: Normal range of motion  Neck supple  Cardiovascular: Normal rate, regular rhythm, normal heart sounds and intact distal pulses  Exam reveals no gallop and no friction rub  No murmur heard  Pulmonary/Chest: Effort normal    Lung sounds diminished in all lung fields   Abdominal: Soft  Bowel sounds are normal    Neurological: He is alert and oriented to person, place, and time  Skin: Skin is warm and dry  Psychiatric: He has a normal mood and affect  Vitals reviewed

## 2020-02-07 ENCOUNTER — HOSPITAL ENCOUNTER (OUTPATIENT)
Dept: RADIOLOGY | Facility: IMAGING CENTER | Age: 75
Discharge: HOME | End: 2020-02-07
Payer: COMMERCIAL

## 2020-02-07 DIAGNOSIS — R91.8 LUNG NODULES: ICD-10-CM

## 2020-02-07 PROCEDURE — 71250 CT THORAX DX C-: CPT

## 2020-07-15 ENCOUNTER — OFFICE VISIT (OUTPATIENT)
Dept: INTERNAL MEDICINE CLINIC | Facility: CLINIC | Age: 75
End: 2020-07-15
Payer: COMMERCIAL

## 2020-07-15 VITALS
HEART RATE: 72 BPM | SYSTOLIC BLOOD PRESSURE: 124 MMHG | TEMPERATURE: 97.8 F | WEIGHT: 135.6 LBS | HEIGHT: 68 IN | OXYGEN SATURATION: 95 % | BODY MASS INDEX: 20.55 KG/M2 | DIASTOLIC BLOOD PRESSURE: 70 MMHG

## 2020-07-15 DIAGNOSIS — R91.8 LUNG NODULES: ICD-10-CM

## 2020-07-15 DIAGNOSIS — J44.9 CHRONIC OBSTRUCTIVE PULMONARY DISEASE, UNSPECIFIED COPD TYPE (HCC): ICD-10-CM

## 2020-07-15 DIAGNOSIS — Z00.00 MEDICARE ANNUAL WELLNESS VISIT, SUBSEQUENT: ICD-10-CM

## 2020-07-15 DIAGNOSIS — M16.11 OSTEOARTHRITIS OF ONE HIP, RIGHT: Primary | ICD-10-CM

## 2020-07-15 DIAGNOSIS — K21.9 GASTROESOPHAGEAL REFLUX DISEASE, ESOPHAGITIS PRESENCE NOT SPECIFIED: ICD-10-CM

## 2020-07-15 PROCEDURE — 1125F AMNT PAIN NOTED PAIN PRSNT: CPT | Performed by: INTERNAL MEDICINE

## 2020-07-15 PROCEDURE — 99214 OFFICE O/P EST MOD 30 MIN: CPT | Performed by: INTERNAL MEDICINE

## 2020-07-15 PROCEDURE — 4004F PT TOBACCO SCREEN RCVD TLK: CPT | Performed by: INTERNAL MEDICINE

## 2020-07-15 PROCEDURE — 1170F FXNL STATUS ASSESSED: CPT | Performed by: INTERNAL MEDICINE

## 2020-07-15 PROCEDURE — 4040F PNEUMOC VAC/ADMIN/RCVD: CPT | Performed by: INTERNAL MEDICINE

## 2020-07-15 PROCEDURE — 1160F RVW MEDS BY RX/DR IN RCRD: CPT | Performed by: INTERNAL MEDICINE

## 2020-07-15 PROCEDURE — 3008F BODY MASS INDEX DOCD: CPT | Performed by: INTERNAL MEDICINE

## 2020-07-15 PROCEDURE — G0439 PPPS, SUBSEQ VISIT: HCPCS | Performed by: INTERNAL MEDICINE

## 2020-07-15 PROCEDURE — 36415 COLL VENOUS BLD VENIPUNCTURE: CPT | Performed by: INTERNAL MEDICINE

## 2020-07-15 RX ORDER — FLUTICASONE FUROATE AND VILANTEROL 100; 25 UG/1; UG/1
1 POWDER RESPIRATORY (INHALATION) DAILY
Qty: 3 INHALER | Refills: 1 | Status: SHIPPED | OUTPATIENT
Start: 2020-07-15 | End: 2021-01-18

## 2020-07-15 NOTE — PROGRESS NOTES
Assessment/Plan:    No problem-specific Assessment & Plan notes found for this encounter  Problem List Items Addressed This Visit        Respiratory    Chronic obstructive pulmonary disease ([address] )       Musculoskeletal and Integument    Osteoarthritis of one hip, right - Primary       Other    Medicare annual wellness visit, subsequent            Subjective:      Patient ID: Lianet Monsalve is a 66 y o  male  He is coming in for his annual check-up  He states that he has a productive cough in the mornings and sometimes in the middle of night that has been ongoing for about ten years  He also occasionally gets short of breath for which he takes fluticasone  The following portions of the patient's history were reviewed and updated as appropriate: allergies, current medications, past family history, past medical history, past social history, past surgical history and problem list     Review of Systems   Constitutional: Negative for fatigue and fever  HENT: Negative for congestion, dental problem, hearing loss, rhinorrhea and sore throat  Respiratory: Positive for cough, shortness of breath and wheezing  Negative for chest tightness  Cardiovascular: Negative for chest pain and palpitations  Gastrointestinal: Negative for constipation, diarrhea, nausea and vomiting  Genitourinary: Negative for frequency  Musculoskeletal: Positive for arthralgias  Negative for back pain and myalgias  Minor pain in right hip   Neurological: Negative for weakness, light-headedness, numbness and headaches  Psychiatric/Behavioral: Negative for sleep disturbance  Objective:      /70 (BP Location: Left arm, Patient Position: Sitting, Cuff Size: Adult)   Pulse 72   Temp 97 8 °F (36 6 °C) (Temporal)   Ht 5' 8" (1 727 m)   Wt 61 5 kg (135 lb 9 6 oz) Comment: w sneakers  SpO2 95%   BMI 20 62 kg/m²          Physical Exam   Constitutional: He is oriented to person, place, and time   He appears well-developed and well-nourished  Neck: Neck supple  Cardiovascular: Normal rate and regular rhythm  Pulmonary/Chest: Effort normal and breath sounds normal  He has no wheezes  He has no rhonchi  He has no rales  Neurological: He is alert and oriented to person, place, and time

## 2020-07-15 NOTE — PROGRESS NOTES
Assessment and Plan:     Problem List Items Addressed This Visit        Respiratory    Chronic obstructive pulmonary disease ([facility] )          Tobacco Cessation Counseling: Tobacco cessation counseling was provided  The patient is sincerely urged to quit consumption of tobacco  He is not ready to quit tobacco  Medication options and side effects of medication discussed  Patient agreed to medication  Preventive health issues were discussed with patient, and age appropriate screening tests were ordered as noted in patient's After Visit Summary  Personalized health advice and appropriate referrals for health education or preventive services given if needed, as noted in patient's After Visit Summary       History of Present Illness:     Patient presents for Medicare Annual Wellness visit    Patient Care Team:  Heide Titus MD as PCP - General (Internal Medicine)  MD Eloisa Caba MD     Problem List:     Patient Active Problem List   Diagnosis    Smith esophagus    Chronic obstructive pulmonary disease ([facility] )    Erectile dysfunction    Adenocarcinoma of prostate ([facility] )    Tobacco abuse    Esophageal reflux    Lung nodules    Hemoptysis    Hiatal hernia    Elevated prostate specific antigen (PSA)    [facility]    Medicare annual wellness visit, subsequent    Right hip pain    Osteoarthritis of one hip, right      Past Medical and Surgical History:     Past Medical History:   Diagnosis Date    COPD (chronic obstructive pulmonary disease) ([facility] )     Esophageal reflux     Inguinal hernia     Rectal adenocarcinoma ([facility] )     Spermatocele      Past Surgical History:   Procedure Laterality Date    CT NEEDLE BIOPSY LUNG  7/9/2019    HERNIA REPAIR      INSERTION PROSTATE RADIATION SEED      ND INSERT,INFLATABLE PENILE PROSTHESIS N/A 3/8/2016    Procedure: INSERTION OF THREE PIECE PENILE PROSTHESIS;  Surgeon: Betty Rosario MD;  [facility];  Service: Urology   [facility] PROSTATE BIOPSY  2014      Family History:     Family History   Problem Relation Age of Onset    No Known Problems Mother     No Known Problems Father       Social History:     E-Cigarette/Vaping    E-Cigarette Use Never User      E-Cigarette/Vaping Substances    Nicotine No     THC No     CBD No     Flavoring No     Other No     Unknown No      Social History     Socioeconomic History    Marital status: Single     Spouse name: None    Number of children: None    Years of education: None    Highest education level: None   Occupational History    None   Social Needs    Financial resource strain: None    Food insecurity:     Worry: None     Inability: None    Transportation needs:     Medical: None     Non-medical: None   Tobacco Use    Smoking status: Current Every Day Smoker     Packs/day: 0 50     Years: 64 00     Pack years: 32 00     Last attempt to quit: 2019     Years since quittin 2    Smokeless tobacco: Never Used   Substance and Sexual Activity    Alcohol use: Yes     Frequency: 4 or more times a week     Drinks per session: 1 or 2     Binge frequency: Less than monthly    Drug use: No    Sexual activity: Not Currently   Lifestyle    Physical activity:     Days per week: None     Minutes per session: None    Stress: None   Relationships    Social connections:     Talks on phone: None     Gets together: None     Attends [employer] service: None     Active member of club or organization: None     Attends meetings of clubs or organizations: None     Relationship status: None    Intimate partner violence:     Fear of current or ex partner: None     Emotionally abused: None     Physically abused: None     Forced sexual activity: None   Other Topics Concern    None   Social History Narrative    None      Medications and Allergies:     Current Outpatient Medications   Medication Sig Dispense Refill    fluticasone-vilanterol (BREO ELLIPTA) 100-25 mcg/inh inhaler Inhale 1 puff daily 3 Inhaler 1    lansoprazole (PREVACID) 30 mg capsule Take 1 capsule (30 mg total) by mouth daily 30 capsule 5     No current facility-administered medications for this visit  Allergies   Allergen Reactions    Penicillins Hives and Other (See Comments)     Other reaction(s): facial swelling  Reaction Unknown      Immunizations:     Immunization History   Administered Date(s) Administered    Influenza TIV (IM) 10/10/2011    Pneumococcal Conjugate 13-Valent 05/30/2017    Pneumococcal Polysaccharide PPV23 10/10/2011    Td (adult), adsorbed 04/01/2008      Health Maintenance:         Topic Date Due    CRC Screening: Colonoscopy  12/23/2029         Topic Date Due    DTaP,Tdap,and Td Vaccines (1 - Tdap) 12/01/1956    Influenza Vaccine  07/01/2020      Medicare Health Risk Assessment:     Ht 5' 8" (1 727 m)   Wt 61 5 kg (135 lb 9 6 oz) Comment: frandy lopez  BMI 20 62 kg/m²      Elmo Ojeda is here for his Subsequent Wellness visit  Last Medicare Wellness visit information reviewed, patient interviewed and updates made to the record today  Health Risk Assessment:   Patient rates overall health as good  Patient feels that their physical health rating is same  Eyesight was rated as same  Hearing was rated as same  Patient feels that their emotional and mental health rating is same  Pain experienced in the last 7 days has been none  Patient states that he has experienced weight loss or gain in last 6 months  Depression Screening:   PHQ-2 Score: 0      Fall Risk Screening: In the past year, patient has experienced: no history of falling in past year      Home Safety:  Patient does not have trouble with stairs inside or outside of their home  Patient has working smoke alarms and has working carbon monoxide detector  Home safety hazards include: none  Nutrition:   Current diet is Regular and Limited junk food  Medications:   Patient is not currently taking any over-the-counter supplements  Patient is able to manage medications  Activities of Daily Living (ADLs)/Instrumental Activities of Daily Living (IADLs):   Walk and transfer into and out of bed and chair?: Yes  Dress and groom yourself?: Yes    Bathe or shower yourself?: Yes    Feed yourself? Yes  Do your laundry/housekeeping?: Yes  Manage your money, pay your bills and track your expenses?: Yes  Make your own meals?: Yes    Do your own shopping?: Yes    Previous Hospitalizations:   Any hospitalizations or ED visits within the last 12 months?: No      Advance Care Planning:   Living will: Yes    Durable POA for healthcare: Yes    Advanced directive: Yes    Advanced directive counseling given: Yes      Cognitive Screening:   Provider or family/friend/caregiver concerned regarding cognition?: No    PREVENTIVE SCREENINGS      Cardiovascular Screening:    General: Screening Current      Colorectal Cancer Screening:     General: Screening Current and History Colorectal Cancer      Prostate Cancer Screening:    General: History Prostate Cancer and Screening Not Indicated      Osteoporosis Screening:    General: Screening Not Indicated      Abdominal Aortic Aneurysm (AAA) Screening:    Risk factors include: tobacco use        General: Screening Current      Lung Cancer Screening:     General: Screening Current      Hepatitis C Screening:    General: Screening Not Indicated    Other Counseling Topics:   Calcium and vitamin D intake and regular weightbearing exercise         Krystal Mojica MD

## 2020-07-15 NOTE — PATIENT INSTRUCTIONS
Medicare Preventive Visit Patient Instructions  Thank you for completing your Welcome to Medicare Visit or Medicare Annual Wellness Visit today  Your next wellness visit will be due in one year (7/15/2021)  The screening/preventive services that you may require over the next 5-10 years are detailed below  Some tests may not apply to you based off risk factors and/or age  Screening tests ordered at today's visit but not completed yet may show as past due  Also, please note that scanned in results may not display below  Preventive Screenings:  Service Recommendations Previous Testing/Comments   Colorectal Cancer Screening  · Colonoscopy    · Fecal Occult Blood Test (FOBT)/Fecal Immunochemical Test (FIT)  · Fecal DNA/Cologuard Test  · Flexible Sigmoidoscopy Age: 54-65 years old   Colonoscopy: every 10 years (May be performed more frequently if at higher risk)  OR  FOBT/FIT: every 1 year  OR  Cologuard: every 3 years  OR  Sigmoidoscopy: every 5 years  Screening may be recommended earlier than age 48 if at higher risk for colorectal cancer  Also, an individualized decision between you and your healthcare provider will decide whether screening between the ages of 74-80 would be appropriate   Colonoscopy: 12/23/2019  FOBT/FIT: Not on file  Cologuard: 12/03/2019  Sigmoidoscopy: Not on file    Screening Current  History Colorectal Cancer     Prostate Cancer Screening Individualized decision between patient and health care provider in men between ages of 53-78   Medicare will cover every 12 months beginning on the day after your 50th birthday PSA: 0 4 ng/mL     History Prostate Cancer  Screening Not Indicated     Hepatitis C Screening Once for adults born between 1945 and 1965  More frequently in patients at high risk for Hepatitis C Hep C Antibody: Not on file       Diabetes Screening 1-2 times per year if you're at risk for diabetes or have pre-diabetes Fasting glucose: 90 mg/dL   A1C: No results in last 5 years Cholesterol Screening Once every 5 years if you don't have a lipid disorder  May order more often based on risk factors  Lipid panel: 12/13/2018    Screening Current      Other Preventive Screenings Covered by Medicare:  1  Abdominal Aortic Aneurysm (AAA) Screening: covered once if your at risk  You're considered to be at risk if you have a family history of AAA or a male between the age of 73-68 who smoking at least 100 cigarettes in your lifetime  2  Lung Cancer Screening: covers low dose CT scan once per year if you meet all of the following conditions: (1) Age 50-69; (2) No signs or symptoms of lung cancer; (3) Current smoker or have quit smoking within the last 15 years; (4) You have a tobacco smoking history of at least 30 pack years (packs per day x number of years you smoked); (5) You get a written order from a healthcare provider  3  Glaucoma Screening: covered annually if you're considered high risk: (1) You have diabetes OR (2) Family history of glaucoma OR (3)  aged 48 and older OR (3)  American aged 72 and older  3  Osteoporosis Screening: covered every 2 years if you meet one of the following conditions: (1) Have a vertebral abnormality; (2) On glucocorticoid therapy for more than 3 months; (3) Have primary hyperparathyroidism; (4) On osteoporosis medications and need to assess response to drug therapy  5  HIV Screening: covered annually if you're between the age of 12-76  Also covered annually if you are younger than 13 and older than 72 with risk factors for HIV infection  For pregnant patients, it is covered up to 3 times per pregnancy      Immunizations:  Immunization Recommendations   Influenza Vaccine Annual influenza vaccination during flu season is recommended for all persons aged >= 6 months who do not have contraindications   Pneumococcal Vaccine (Prevnar and Pneumovax)  * Prevnar = PCV13  * Pneumovax = PPSV23 Adults 25-60 years old: 1-3 doses may be recommended based on certain risk factors  Adults 72 years old: Prevnar (PCV13) vaccine recommended followed by Pneumovax (PPSV23) vaccine  If already received PPSV23 since turning 65, then PCV13 recommended at least one year after PPSV23 dose  Hepatitis B Vaccine 3 dose series if at intermediate or high risk (ex: diabetes, end stage renal disease, liver disease)   Tetanus (Td) Vaccine - COST NOT COVERED BY MEDICARE PART B Following completion of primary series, a booster dose should be given every 10 years to maintain immunity against tetanus  Td may also be given as tetanus wound prophylaxis  Tdap Vaccine - COST NOT COVERED BY MEDICARE PART B Recommended at least once for all adults  For pregnant patients, recommended with each pregnancy  Shingles Vaccine (Shingrix) - COST NOT COVERED BY MEDICARE PART B  2 shot series recommended in those aged 48 and above     Health Maintenance Due:      Topic Date Due    CRC Screening: Colonoscopy  12/23/2029     Immunizations Due:      Topic Date Due    DTaP,Tdap,and Td Vaccines (1 - Tdap) 12/01/1956    Influenza Vaccine  07/01/2020     Advance Directives   What are advance directives? Advance directives are legal documents that state your wishes and plans for medical care  These plans are made ahead of time in case you lose your ability to make decisions for yourself  Advance directives can apply to any medical decision, such as the treatments you want, and if you want to donate organs  What are the types of advance directives? There are many types of advance directives, and each state has rules about how to use them  You may choose a combination of any of the following:  · Living will: This is a written record of the treatment you want  You can also choose which treatments you do not want, which to limit, and which to stop at a certain time  This includes surgery, medicine, IV fluid, and tube feedings  · Durable power of  for healthcare [facility]):   This is a written record that states who you want to make healthcare choices for you when you are unable to make them for yourself  This person, called a proxy, is usually a family member or a friend  You may choose more than 1 proxy  · Do not resuscitate (DNR) order:  A DNR order is used in case your heart stops beating or you stop breathing  It is a request not to have certain forms of treatment, such as CPR  A DNR order may be included in other types of advance directives  · Medical directive: This covers the care that you want if you are in a coma, near death, or unable to make decisions for yourself  You can list the treatments you want for each condition  Treatment may include pain medicine, surgery, blood transfusions, dialysis, IV or tube feedings, and a ventilator (breathing machine)  · Values history: This document has questions about your views, beliefs, and how you feel and think about life  This information can help others choose the care that you would choose  Why are advance directives important? An advance directive helps you control your care  Although spoken wishes may be used, it is better to have your wishes written down  Spoken wishes can be misunderstood, or not followed  Treatments may be given even if you do not want them  An advance directive may make it easier for your family to make difficult choices about your care  Cigarette Smoking and Your Health   Risks to your health if you smoke:  Nicotine and other chemicals found in tobacco damage every cell in your body  Even if you are a light smoker, you have an increased risk for cancer, heart disease, and lung disease  If you are pregnant or have diabetes, smoking increases your risk for complications  Benefits to your health if you stop smoking:   · You decrease respiratory symptoms such as coughing, wheezing, and shortness of breath     · You reduce your risk for cancers of the lung, mouth, throat, kidney, bladder, pancreas, stomach, and cervix  If you already have cancer, you increase the benefits of chemotherapy  You also reduce your risk for cancer returning or a second cancer from developing  · You reduce your risk for heart disease, blood clots, heart attack, and stroke  · You reduce your risk for lung infections, and diseases such as pneumonia, asthma, chronic bronchitis, and emphysema  · Your circulation improves  More oxygen can be delivered to your body  If you have diabetes, you lower your risk for complications, such as kidney, artery, and eye diseases  You also lower your risk for nerve damage  Nerve damage can lead to amputations, poor vision, and blindness  · You improve your body's ability to heal and to fight infections  For more information and support to stop smoking:   · [employer]okefr[employer]tv  gov  Phone: 9- 649 - 921-7284  Web Address: www wmbly  [facility] [address] Information is for End User's use only and may not be sold, redistributed or otherwise used for commercial purposes   All illustrations and images included in CareNotes® are the copyrighted property of A D A M , Inc  or [address] [place] [address]

## 2020-07-16 LAB
ALBUMIN SERPL-MCNC: 4.1 G/DL (ref 3.6–5.1)
ALBUMIN/GLOB SERPL: 1.4 (CALC) (ref 1–2.5)
ALP SERPL-CCNC: 61 U/L (ref 35–144)
ALT SERPL-CCNC: 15 U/L (ref 9–46)
APPEARANCE UR: CLEAR
AST SERPL-CCNC: 23 U/L (ref 10–35)
BACTERIA UR CULT: NORMAL
BACTERIA UR QL AUTO: NORMAL /HPF
BASOPHILS # BLD AUTO: 33 CELLS/UL (ref 0–200)
BASOPHILS NFR BLD AUTO: 0.4 %
BILIRUB SERPL-MCNC: 0.6 MG/DL (ref 0.2–1.2)
BILIRUB UR QL STRIP: NEGATIVE
BUN SERPL-MCNC: 16 MG/DL (ref 7–25)
BUN/CREAT SERPL: NORMAL (CALC) (ref 6–22)
CALCIUM SERPL-MCNC: 9 MG/DL (ref 8.6–10.3)
CHLORIDE SERPL-SCNC: 108 MMOL/L (ref 98–110)
CHOLEST SERPL-MCNC: 179 MG/DL
CHOLEST/HDLC SERPL: 2.5 (CALC)
CO2 SERPL-SCNC: 26 MMOL/L (ref 20–32)
COLOR UR: NORMAL
CREAT SERPL-MCNC: 0.9 MG/DL (ref 0.7–1.18)
EOSINOPHIL # BLD AUTO: 139 CELLS/UL (ref 15–500)
EOSINOPHIL NFR BLD AUTO: 1.7 %
ERYTHROCYTE [DISTWIDTH] IN BLOOD BY AUTOMATED COUNT: 12.3 % (ref 11–15)
GLOBULIN SER CALC-MCNC: 2.9 G/DL (CALC) (ref 1.9–3.7)
GLUCOSE SERPL-MCNC: 98 MG/DL (ref 65–99)
GLUCOSE UR QL STRIP: NEGATIVE
HCT VFR BLD AUTO: 45.9 % (ref 38.5–50)
HDLC SERPL-MCNC: 71 MG/DL
HGB BLD-MCNC: 15 G/DL (ref 13.2–17.1)
HGB UR QL STRIP: NEGATIVE
HYALINE CASTS #/AREA URNS LPF: NORMAL /LPF
KETONES UR QL STRIP: NEGATIVE
LDLC SERPL CALC-MCNC: 95 MG/DL (CALC)
LEUKOCYTE ESTERASE UR QL STRIP: NEGATIVE
LYMPHOCYTES # BLD AUTO: 1140 CELLS/UL (ref 850–3900)
LYMPHOCYTES NFR BLD AUTO: 13.9 %
MCH RBC QN AUTO: 32.4 PG (ref 27–33)
MCHC RBC AUTO-ENTMCNC: 32.7 G/DL (ref 32–36)
MCV RBC AUTO: 99.1 FL (ref 80–100)
MONOCYTES # BLD AUTO: 763 CELLS/UL (ref 200–950)
MONOCYTES NFR BLD AUTO: 9.3 %
NEUTROPHILS # BLD AUTO: 6125 CELLS/UL (ref 1500–7800)
NEUTROPHILS NFR BLD AUTO: 74.7 %
NITRITE UR QL STRIP: NEGATIVE
NONHDLC SERPL-MCNC: 108 MG/DL (CALC)
PH UR STRIP: 5.5 [PH] (ref 5–8)
PLATELET # BLD AUTO: 260 THOUSAND/UL (ref 140–400)
PMV BLD REES-ECKER: 10 FL (ref 7.5–12.5)
POTASSIUM SERPL-SCNC: 4.9 MMOL/L (ref 3.5–5.3)
PROT SERPL-MCNC: 7 G/DL (ref 6.1–8.1)
PROT UR QL STRIP: NEGATIVE
RBC # BLD AUTO: 4.63 MILLION/UL (ref 4.2–5.8)
RBC #/AREA URNS HPF: NORMAL /HPF
SL AMB EGFR AFRICAN AMERICAN: 96 ML/MIN/1.73M2
SL AMB EGFR NON AFRICAN AMERICAN: 83 ML/MIN/1.73M2
SODIUM SERPL-SCNC: 141 MMOL/L (ref 135–146)
SP GR UR STRIP: 1.03 (ref 1–1.03)
SQUAMOUS #/AREA URNS HPF: NORMAL /HPF
TRIGL SERPL-MCNC: 51 MG/DL
TSH SERPL-ACNC: 2.08 MIU/L (ref 0.4–4.5)
WBC # BLD AUTO: 8.2 THOUSAND/UL (ref 3.8–10.8)
WBC #/AREA URNS HPF: NORMAL /HPF

## 2020-07-27 ENCOUNTER — HOSPITAL ENCOUNTER (OUTPATIENT)
Dept: RADIOLOGY | Age: 75
Discharge: HOME | End: 2020-07-27
Attending: INTERNAL MEDICINE
Payer: COMMERCIAL

## 2020-07-27 DIAGNOSIS — R91.8 LUNG NODULES: ICD-10-CM

## 2020-07-27 PROCEDURE — 71250 CT THORAX DX C-: CPT

## 2020-12-26 ENCOUNTER — OFFICE VISIT (OUTPATIENT)
Dept: URGENT CARE | Age: 75
End: 2020-12-26
Payer: COMMERCIAL

## 2020-12-26 ENCOUNTER — APPOINTMENT (OUTPATIENT)
Dept: RADIOLOGY | Age: 75
End: 2020-12-26
Payer: COMMERCIAL

## 2020-12-26 VITALS
WEIGHT: 135 LBS | TEMPERATURE: 98 F | RESPIRATION RATE: 24 BRPM | BODY MASS INDEX: 20.46 KG/M2 | HEIGHT: 68 IN | SYSTOLIC BLOOD PRESSURE: 142 MMHG | HEART RATE: 80 BPM | DIASTOLIC BLOOD PRESSURE: 78 MMHG | OXYGEN SATURATION: 96 %

## 2020-12-26 DIAGNOSIS — S20.211A CONTUSION OF RIB ON RIGHT SIDE, INITIAL ENCOUNTER: Primary | ICD-10-CM

## 2020-12-26 DIAGNOSIS — T14.90XA INJURY: ICD-10-CM

## 2020-12-26 PROCEDURE — 99213 OFFICE O/P EST LOW 20 MIN: CPT | Performed by: PHYSICIAN ASSISTANT

## 2020-12-26 PROCEDURE — S9083 URGENT CARE CENTER GLOBAL: HCPCS | Performed by: PHYSICIAN ASSISTANT

## 2020-12-26 PROCEDURE — 71046 X-RAY EXAM CHEST 2 VIEWS: CPT

## 2021-01-18 DIAGNOSIS — J44.9 CHRONIC OBSTRUCTIVE PULMONARY DISEASE, UNSPECIFIED COPD TYPE (HCC): ICD-10-CM

## 2021-01-18 RX ORDER — FLUTICASONE FUROATE AND VILANTEROL TRIFENATATE 100; 25 UG/1; UG/1
POWDER RESPIRATORY (INHALATION)
Qty: 1 INHALER | Refills: 2 | Status: SHIPPED | OUTPATIENT
Start: 2021-01-18 | End: 2021-05-08 | Stop reason: SDUPTHER

## 2021-01-26 ENCOUNTER — OFFICE VISIT (OUTPATIENT)
Dept: INTERNAL MEDICINE CLINIC | Facility: CLINIC | Age: 76
End: 2021-01-26
Payer: COMMERCIAL

## 2021-01-26 VITALS
WEIGHT: 131 LBS | TEMPERATURE: 97.6 F | BODY MASS INDEX: 19.85 KG/M2 | SYSTOLIC BLOOD PRESSURE: 130 MMHG | OXYGEN SATURATION: 96 % | HEART RATE: 86 BPM | DIASTOLIC BLOOD PRESSURE: 78 MMHG | HEIGHT: 68 IN

## 2021-01-26 DIAGNOSIS — R91.8 LUNG NODULES: Primary | ICD-10-CM

## 2021-01-26 DIAGNOSIS — K21.9 GASTROESOPHAGEAL REFLUX DISEASE, UNSPECIFIED WHETHER ESOPHAGITIS PRESENT: ICD-10-CM

## 2021-01-26 DIAGNOSIS — Z72.0 TOBACCO ABUSE: ICD-10-CM

## 2021-01-26 DIAGNOSIS — K22.70 BARRETT'S ESOPHAGUS WITHOUT DYSPLASIA: ICD-10-CM

## 2021-01-26 DIAGNOSIS — E78.9 LIPID DISORDER: ICD-10-CM

## 2021-01-26 DIAGNOSIS — J43.1 PANLOBULAR EMPHYSEMA (HCC): ICD-10-CM

## 2021-01-26 PROCEDURE — 99214 OFFICE O/P EST MOD 30 MIN: CPT | Performed by: INTERNAL MEDICINE

## 2021-01-26 PROCEDURE — 1160F RVW MEDS BY RX/DR IN RCRD: CPT | Performed by: INTERNAL MEDICINE

## 2021-01-26 PROCEDURE — 3725F SCREEN DEPRESSION PERFORMED: CPT | Performed by: INTERNAL MEDICINE

## 2021-01-26 PROCEDURE — 4004F PT TOBACCO SCREEN RCVD TLK: CPT | Performed by: INTERNAL MEDICINE

## 2021-01-26 NOTE — PROGRESS NOTES
Assessment/Plan:     Diagnoses and all orders for this visit:    Lung nodules  -     CT chest wo contrast; Future    Panlobular emphysema (HCC)  -     Comprehensive metabolic panel; Future  -     UA w Reflex to Microscopic w Reflex to Culture    Gastroesophageal reflux disease, unspecified whether esophagitis present  -     CBC and differential; Future    Tobacco abuse    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  -     Comprehensive metabolic panel; Future  -     UA w Reflex to Microscopic w Reflex to Culture    Lipid disorder  -     Lipid panel; Future  -     TSH, 3rd generation with Free T4 reflex; Future             Subjective:   Chief Complaint   Patient presents with    Follow-up     6 month follow up  Patient ID: Antonia Gates is a 66 y o  male  [de-identified] years young gentleman is here for the regular follow-up medical problems include  1   COPD he is taking Breo he is okay shortness of breath on exertion only cough in the morning he continued to smoke and does not wanted to stop  Gastroesophageal reflux disease is stable he takes PPI as needed  Smith's esophagus need to follow-up with the GI for repeat EGD but he have not been to them  Several years ago there was a question of rectal cancer but he did not follow-up he does not have any problem except for some multiple bowel movements after eating and he thinks it is because of the irritable bowel syndrome  Last blood workup was essentially unremarkable  Multiple lung nodules will follow-up with the CT scan of the chest   On smoke and drink some alcohol almost every day very active gentleman remodeling the house right now      The following portions of the patient's history were reviewed and updated as appropriate: allergies, current medications, past family history, past medical history, past social history, past surgical history and problem list     Review of Systems   Constitutional: Positive for appetite change and unexpected weight change  Negative for activity change, fatigue and fever  HENT: Negative for congestion, sinus pain and sore throat  Eyes: Negative for discharge  Respiratory: Positive for cough and shortness of breath  Cardiovascular: Negative for chest pain and palpitations  Gastrointestinal: Positive for diarrhea  Negative for abdominal distention, abdominal pain, anal bleeding, blood in stool, constipation and nausea  Genitourinary: Negative for hematuria and urgency  Musculoskeletal: Negative for back pain and gait problem  Neurological: Negative for dizziness, weakness and light-headedness  Psychiatric/Behavioral: Positive for sleep disturbance  The patient is not nervous/anxious            Past Medical History:   Diagnosis Date    [facility] obstructive [facility] disease) [facility] )     Esophageal reflux     Inguinal hernia     Rectal adenocarcinoma (HCC)     Spermatocele          Current Outpatient Medications:     Breo Ellipta 100-25 MCG/INH inhaler, TAKE 1 PUFF BY MOUTH EVERY DAY, Disp: 1 Inhaler, Rfl: 2    lansoprazole (PREVACID) 30 mg capsule, Take 1 capsule (30 mg total) by mouth daily, Disp: 30 capsule, Rfl: 5    Allergies   Allergen Reactions    Penicillins Hives and Other (See Comments)     Other reaction(s): facial swelling  Reaction Unknown       Social History   Past Surgical History:   Procedure Laterality Date    CT NEEDLE BIOPSY LUNG  7/9/2019    HERNIA REPAIR      INSERTION PROSTATE RADIATION SEED      HI INSERT,INFLATABLE PENILE PROSTHESIS N/A 3/8/2016    Procedure: INSERTION OF THREE PIECE PENILE PROSTHESIS;  Surgeon: Tere Stuart MD;  [facility];  Service: Urology    PROSTATE BIOPSY  01/06/2014     Family History   Problem Relation Age of Onset    No Known Problems Mother     No Known Problems Father        Objective:  /78 (BP Location: Left arm, Patient Position: Sitting, Cuff Size: Adult)   Pulse 86   Temp 97 6 °F (36 4 °C)   Ht 5' 8" (1 727 m)   Wt 59 4 kg (131 lb)   SpO2 96%   BMI 19 92 kg/m²        Physical Exam  Constitutional:       Appearance: He is well-developed  HENT:      Head: Normocephalic  Right Ear: External ear normal       Nose: Nose normal    Eyes:      Pupils: Pupils are equal, round, and reactive to light  Neck:      Musculoskeletal: Normal range of motion and neck supple  Thyroid: No thyromegaly  Vascular: No JVD  Trachea: No tracheal deviation  Cardiovascular:      Rate and Rhythm: Normal rate and regular rhythm  Heart sounds: Normal heart sounds  Pulmonary:      Breath sounds: Decreased breath sounds present  Abdominal:      General: Bowel sounds are normal       Palpations: Abdomen is soft  Lymphadenopathy:      Cervical: No cervical adenopathy  Skin:     General: Skin is warm and dry  Neurological:      Mental Status: He is alert and oriented to person, place, and time  Psychiatric:         Behavior: Behavior normal          Thought Content:  Thought content normal          Judgment: Judgment normal

## 2021-02-05 ENCOUNTER — HOSPITAL ENCOUNTER (OUTPATIENT)
Dept: RADIOLOGY | Facility: IMAGING CENTER | Age: 76
Discharge: HOME | End: 2021-02-05
Payer: COMMERCIAL

## 2021-02-05 ENCOUNTER — APPOINTMENT (OUTPATIENT)
Dept: LAB | Facility: IMAGING CENTER | Age: 76
End: 2021-02-05
Payer: COMMERCIAL

## 2021-02-05 DIAGNOSIS — K22.70 BARRETT'S ESOPHAGUS WITHOUT DYSPLASIA: ICD-10-CM

## 2021-02-05 DIAGNOSIS — E78.9 LIPID DISORDER: ICD-10-CM

## 2021-02-05 DIAGNOSIS — K21.9 GASTROESOPHAGEAL REFLUX DISEASE, UNSPECIFIED WHETHER ESOPHAGITIS PRESENT: ICD-10-CM

## 2021-02-05 DIAGNOSIS — R91.8 LUNG NODULES: ICD-10-CM

## 2021-02-05 DIAGNOSIS — J43.1 PANLOBULAR EMPHYSEMA (HCC): ICD-10-CM

## 2021-02-05 LAB
ALBUMIN SERPL BCP-MCNC: 3.8 G/DL (ref 3.5–5)
ALP SERPL-CCNC: 82 U/L (ref 46–116)
ALT SERPL W P-5'-P-CCNC: 22 U/L (ref 12–78)
ANION GAP SERPL CALCULATED.3IONS-SCNC: 4 MMOL/L (ref 4–13)
AST SERPL W P-5'-P-CCNC: 20 U/L (ref 5–45)
BASOPHILS # BLD AUTO: 0.05 THOUSANDS/ΜL (ref 0–0.1)
BASOPHILS NFR BLD AUTO: 1 % (ref 0–1)
BILIRUB SERPL-MCNC: 0.53 MG/DL (ref 0.2–1)
BILIRUB UR QL STRIP: NEGATIVE
BUN SERPL-MCNC: 18 MG/DL (ref 5–25)
CALCIUM SERPL-MCNC: 9.4 MG/DL (ref 8.3–10.1)
CHLORIDE SERPL-SCNC: 108 MMOL/L (ref 100–108)
CHOLEST SERPL-MCNC: 196 MG/DL (ref 50–200)
CLARITY UR: CLEAR
CO2 SERPL-SCNC: 29 MMOL/L (ref 21–32)
COLOR UR: NORMAL
CREAT SERPL-MCNC: 0.87 MG/DL (ref 0.6–1.3)
EOSINOPHIL # BLD AUTO: 0.13 THOUSAND/ΜL (ref 0–0.61)
EOSINOPHIL NFR BLD AUTO: 2 % (ref 0–6)
ERYTHROCYTE [DISTWIDTH] IN BLOOD BY AUTOMATED COUNT: 13.5 % (ref 11.6–15.1)
GFR SERPL CREATININE-BSD FRML MDRD: 83 ML/MIN/1.73SQ M
GLUCOSE P FAST SERPL-MCNC: 83 MG/DL (ref 65–99)
GLUCOSE UR STRIP-MCNC: NEGATIVE MG/DL
HCT VFR BLD AUTO: 49.6 % (ref 36.5–49.3)
HDLC SERPL-MCNC: 71 MG/DL
HGB BLD-MCNC: 15.6 G/DL (ref 12–17)
HGB UR QL STRIP.AUTO: NEGATIVE
IMM GRANULOCYTES # BLD AUTO: 0.04 THOUSAND/UL (ref 0–0.2)
IMM GRANULOCYTES NFR BLD AUTO: 1 % (ref 0–2)
KETONES UR STRIP-MCNC: NEGATIVE MG/DL
LDLC SERPL CALC-MCNC: 107 MG/DL (ref 0–100)
LEUKOCYTE ESTERASE UR QL STRIP: NEGATIVE
LYMPHOCYTES # BLD AUTO: 1.44 THOUSANDS/ΜL (ref 0.6–4.47)
LYMPHOCYTES NFR BLD AUTO: 17 % (ref 14–44)
MCH RBC QN AUTO: 32.5 PG (ref 26.8–34.3)
MCHC RBC AUTO-ENTMCNC: 31.5 G/DL (ref 31.4–37.4)
MCV RBC AUTO: 103 FL (ref 82–98)
MONOCYTES # BLD AUTO: 0.89 THOUSAND/ΜL (ref 0.17–1.22)
MONOCYTES NFR BLD AUTO: 10 % (ref 4–12)
NEUTROPHILS # BLD AUTO: 6.09 THOUSANDS/ΜL (ref 1.85–7.62)
NEUTS SEG NFR BLD AUTO: 69 % (ref 43–75)
NITRITE UR QL STRIP: NEGATIVE
NONHDLC SERPL-MCNC: 125 MG/DL
NRBC BLD AUTO-RTO: 0 /100 WBCS
PH UR STRIP.AUTO: 6.5 [PH]
PLATELET # BLD AUTO: 281 THOUSANDS/UL (ref 149–390)
PMV BLD AUTO: 10 FL (ref 8.9–12.7)
POTASSIUM SERPL-SCNC: 4.4 MMOL/L (ref 3.5–5.3)
PROT SERPL-MCNC: 7.6 G/DL (ref 6.4–8.2)
PROT UR STRIP-MCNC: NEGATIVE MG/DL
RBC # BLD AUTO: 4.8 MILLION/UL (ref 3.88–5.62)
SODIUM SERPL-SCNC: 141 MMOL/L (ref 136–145)
SP GR UR STRIP.AUTO: 1.03 (ref 1–1.03)
T4 FREE SERPL-MCNC: 0.97 NG/DL (ref 0.76–1.46)
TRIGL SERPL-MCNC: 89 MG/DL
TSH SERPL DL<=0.05 MIU/L-ACNC: 4.06 UIU/ML (ref 0.36–3.74)
UROBILINOGEN UR QL STRIP.AUTO: 0.2 E.U./DL
WBC # BLD AUTO: 8.64 THOUSAND/UL (ref 4.31–10.16)

## 2021-02-05 PROCEDURE — 84443 ASSAY THYROID STIM HORMONE: CPT

## 2021-02-05 PROCEDURE — 71250 CT THORAX DX C-: CPT

## 2021-02-05 PROCEDURE — 36415 COLL VENOUS BLD VENIPUNCTURE: CPT

## 2021-02-05 PROCEDURE — 84439 ASSAY OF FREE THYROXINE: CPT

## 2021-02-05 PROCEDURE — 85025 COMPLETE CBC W/AUTO DIFF WBC: CPT

## 2021-02-05 PROCEDURE — 80053 COMPREHEN METABOLIC PANEL: CPT

## 2021-02-05 PROCEDURE — 80061 LIPID PANEL: CPT

## 2021-02-05 PROCEDURE — G1004 CDSM NDSC: HCPCS

## 2021-02-05 PROCEDURE — 81003 URINALYSIS AUTO W/O SCOPE: CPT | Performed by: INTERNAL MEDICINE

## 2021-02-09 DIAGNOSIS — R91.8 LUNG NODULES: Primary | ICD-10-CM

## 2021-04-28 DIAGNOSIS — J44.9 CHRONIC OBSTRUCTIVE PULMONARY DISEASE, UNSPECIFIED COPD TYPE (HCC): ICD-10-CM

## 2021-04-28 RX ORDER — FLUTICASONE FUROATE AND VILANTEROL TRIFENATATE 100; 25 UG/1; UG/1
POWDER RESPIRATORY (INHALATION)
Refills: 2 | OUTPATIENT
Start: 2021-04-28

## 2021-05-08 DIAGNOSIS — J44.9 CHRONIC OBSTRUCTIVE PULMONARY DISEASE, UNSPECIFIED COPD TYPE (HCC): ICD-10-CM

## 2021-05-10 RX ORDER — FLUTICASONE FUROATE AND VILANTEROL TRIFENATATE 100; 25 UG/1; UG/1
1 POWDER RESPIRATORY (INHALATION) DAILY
Qty: 1 EACH | Refills: 2 | Status: SHIPPED | OUTPATIENT
Start: 2021-05-10 | End: 2021-07-07 | Stop reason: SDUPTHER

## 2021-05-10 NOTE — TELEPHONE ENCOUNTER
LOV: 1/26/21  NOV: 7/26/21 Problem: Patient Care Overview  Goal: Plan of Care/Patient Progress Review  Outcome: No Change  VSS.  Pain well controlled, requesting prn pain medications as needed.  Up independently in room.  Working on breastfeeding  and  cares. On pathway. Continue to monitor and notify MD as needed.

## 2021-05-12 ENCOUNTER — HOSPITAL ENCOUNTER (OUTPATIENT)
Dept: RADIOLOGY | Facility: IMAGING CENTER | Age: 76
Discharge: HOME | End: 2021-05-12
Payer: COMMERCIAL

## 2021-05-12 DIAGNOSIS — R91.8 LUNG NODULES: ICD-10-CM

## 2021-05-12 PROCEDURE — 71250 CT THORAX DX C-: CPT

## 2021-05-12 PROCEDURE — G1004 CDSM NDSC: HCPCS

## 2021-07-07 DIAGNOSIS — J44.9 CHRONIC OBSTRUCTIVE PULMONARY DISEASE, UNSPECIFIED COPD TYPE (HCC): ICD-10-CM

## 2021-07-07 RX ORDER — FLUTICASONE FUROATE AND VILANTEROL TRIFENATATE 100; 25 UG/1; UG/1
1 POWDER RESPIRATORY (INHALATION) DAILY
Qty: 1 EACH | Refills: 2 | Status: SHIPPED | OUTPATIENT
Start: 2021-07-07 | End: 2021-07-26 | Stop reason: SDUPTHER

## 2021-07-20 ENCOUNTER — RA CDI HCC (OUTPATIENT)
Dept: OTHER | Facility: HOSPITAL | Age: 76
End: 2021-07-20

## 2021-07-20 NOTE — PROGRESS NOTES
[facility]  coding opportunities          Chart reviewed, no opportunity found: CHART REVIEWED, NO OPPORTUNITY FOUND   C61 - history of  Protein calorie malnutrition based on BMI, labs are ok and no supporting documentation found                    Patients insurance company: [facility] [address]  (Medicare Advantage and Commercial)

## 2021-07-26 ENCOUNTER — OFFICE VISIT (OUTPATIENT)
Dept: INTERNAL MEDICINE CLINIC | Facility: CLINIC | Age: 76
End: 2021-07-26
Payer: COMMERCIAL

## 2021-07-26 VITALS
HEART RATE: 63 BPM | SYSTOLIC BLOOD PRESSURE: 150 MMHG | BODY MASS INDEX: 19 KG/M2 | TEMPERATURE: 98.3 F | DIASTOLIC BLOOD PRESSURE: 80 MMHG | OXYGEN SATURATION: 99 % | HEIGHT: 68 IN | WEIGHT: 125.4 LBS

## 2021-07-26 DIAGNOSIS — Z00.00 MEDICARE ANNUAL WELLNESS VISIT, SUBSEQUENT: ICD-10-CM

## 2021-07-26 DIAGNOSIS — C61 PROSTATE CANCER (HCC): ICD-10-CM

## 2021-07-26 DIAGNOSIS — D53.9 MACROCYTIC ANEMIA: ICD-10-CM

## 2021-07-26 DIAGNOSIS — R91.8 LUNG NODULES: ICD-10-CM

## 2021-07-26 DIAGNOSIS — E44.0 MODERATE PROTEIN-CALORIE MALNUTRITION (HCC): Primary | ICD-10-CM

## 2021-07-26 DIAGNOSIS — Z72.0 TOBACCO ABUSE: ICD-10-CM

## 2021-07-26 DIAGNOSIS — Z11.59 ENCOUNTER FOR HEPATITIS C SCREENING TEST FOR LOW RISK PATIENT: ICD-10-CM

## 2021-07-26 DIAGNOSIS — J44.9 CHRONIC OBSTRUCTIVE PULMONARY DISEASE, UNSPECIFIED COPD TYPE (HCC): ICD-10-CM

## 2021-07-26 DIAGNOSIS — C61 ADENOCARCINOMA OF PROSTATE (HCC): ICD-10-CM

## 2021-07-26 PROCEDURE — G0439 PPPS, SUBSEQ VISIT: HCPCS | Performed by: INTERNAL MEDICINE

## 2021-07-26 PROCEDURE — 1160F RVW MEDS BY RX/DR IN RCRD: CPT | Performed by: INTERNAL MEDICINE

## 2021-07-26 PROCEDURE — 1170F FXNL STATUS ASSESSED: CPT | Performed by: INTERNAL MEDICINE

## 2021-07-26 PROCEDURE — 99214 OFFICE O/P EST MOD 30 MIN: CPT | Performed by: INTERNAL MEDICINE

## 2021-07-26 PROCEDURE — 3725F SCREEN DEPRESSION PERFORMED: CPT | Performed by: INTERNAL MEDICINE

## 2021-07-26 PROCEDURE — 1125F AMNT PAIN NOTED PAIN PRSNT: CPT | Performed by: INTERNAL MEDICINE

## 2021-07-26 PROCEDURE — 36415 COLL VENOUS BLD VENIPUNCTURE: CPT | Performed by: INTERNAL MEDICINE

## 2021-07-26 PROCEDURE — 3288F FALL RISK ASSESSMENT DOCD: CPT | Performed by: INTERNAL MEDICINE

## 2021-07-26 RX ORDER — FLUTICASONE FUROATE AND VILANTEROL TRIFENATATE 100; 25 UG/1; UG/1
1 POWDER RESPIRATORY (INHALATION) DAILY
Qty: 3 EACH | Refills: 1 | Status: SHIPPED | OUTPATIENT
Start: 2021-07-26 | End: 2022-01-25 | Stop reason: SDUPTHER

## 2021-07-26 RX ORDER — MIRTAZAPINE 15 MG/1
15 TABLET, FILM COATED ORAL
Qty: 30 TABLET | Refills: 5 | Status: SHIPPED | OUTPATIENT
Start: 2021-07-26 | End: 2022-05-17

## 2021-07-26 NOTE — PROGRESS NOTES
Assessment and Plan:     Problem List Items Addressed This Visit     None           Preventive health issues were discussed with patient, and age appropriate screening tests were ordered as noted in patient's After Visit Summary  Personalized health advice and appropriate referrals for health education or preventive services given if needed, as noted in patient's After Visit Summary       History of Present Illness:     Patient presents for Medicare Annual Wellness visit    Patient Care Team:  Beth Aldana MD as PCP - General (Internal Medicine)     Problem List:     Patient Active Problem List   Diagnosis    Smith esophagus    Chronic obstructive pulmonary disease (HCC)    Erectile dysfunction    Tobacco abuse    Esophageal reflux    Lung nodules    Hiatal hernia    Elevated prostate specific antigen (PSA)    Prostate cancer ([facility]    Medicare annual wellness visit, subsequent    Right hip pain    Osteoarthritis of one hip, right      Past Medical and Surgical History:     Past Medical History:   Diagnosis Date    COPD (chronic obstructive pulmonary disease) ([facility]     Esophageal reflux     Inguinal hernia     [facility]     Spermatocele      Past Surgical History:   Procedure Laterality Date    CT NEEDLE BIOPSY LUNG  7/9/2019    HERNIA REPAIR      INSERTION PROSTATE RADIATION SEED      NM INSERT,INFLATABLE PENILE PROSTHESIS N/A 3/8/2016    Procedure: INSERTION OF THREE PIECE PENILE PROSTHESIS;  Surgeon: Beatriz Ford MD;  [facility];  Service: Urology    PROSTATE BIOPSY  01/06/2014      Family History:     Family History   Problem Relation Age of Onset    No Known Problems Mother     No Known Problems Father       Social History:     Social History     Socioeconomic History    Marital status: Single     Spouse name: Not on file    Number of children: Not on file    Years of education: Not on file    Highest education level: Not on file   Occupational History    Not on file   Tobacco Use    Smoking status: Current Every Day Smoker     Packs/day: 0 50     Years: 64 00     Pack years: 32 00     Last attempt to quit: 2019     Years since quittin 2    Smokeless tobacco: Never Used   Vaping Use    Vaping Use: Never used   Substance and Sexual Activity    Alcohol use: Yes    Drug use: No    Sexual activity: Not Currently   Other Topics Concern    Not on file   Social History Narrative    Not on file     Social Determinants of Health     Financial Resource Strain:     Difficulty of Paying Living Expenses:    Food Insecurity:     Worried About Running Out of Food in the Last Year:     [address] in the Last Year:    Transportation Needs:     Lack of Transportation (Medical):  Lack of Transportation (Non-Medical):    Physical Activity:     Days of Exercise per Week:     Minutes of Exercise per Session:    Stress:     Feeling of Stress :    Social Connections:     Frequency of Communication with Friends and Family:     Frequency of Social Gatherings with Friends and Family:     Attends Mandaeism Services:     Active Member of Clubs or Organizations:     Attends Club or Organization Meetings:     Marital Status:    Intimate Partner Violence:     Fear of Current or Ex-Partner:     Emotionally Abused:     Physically Abused:     Sexually Abused:       Medications and Allergies:     Current Outpatient Medications   Medication Sig Dispense Refill    fluticasone-vilanterol (Breo Ellipta) 100-25 mcg/inh inhaler Inhale 1 puff daily Rinse mouth after use  1 each 2    lansoprazole (PREVACID) 30 mg capsule Take 1 capsule (30 mg total) by mouth daily 30 capsule 5     No current facility-administered medications for this visit       Allergies   Allergen Reactions    Penicillins Hives and Other (See Comments)     Other reaction(s): facial swelling  Reaction Unknown      Immunizations:     Immunization History   Administered Date(s) Administered    Influenza, seasonal, injectable 10/10/2011    Pneumococcal Conjugate 13-Valent 05/30/2017    Pneumococcal Polysaccharide PPV23 10/10/2011    Td (adult), adsorbed 04/01/2008      Health Maintenance:         Topic Date Due    Hepatitis C Screening  Never done    Lung Cancer Screening  05/12/2022         Topic Date Due    COVID-19 Vaccine (1) Never done    DTaP,Tdap,and Td Vaccines (1 - Tdap) [birth year]01/1964    Influenza Vaccine (1) 09/01/2021      Medicare Health Risk Assessment:     There were no vitals taken for this visit  Luis Armando Johnson is here for his Subsequent Wellness visit  Health Risk Assessment:   Patient rates overall health as good  Patient feels that their physical health rating is same  Patient is satisfied with their life  Eyesight was rated as same  Hearing was rated as same  Patient feels that their emotional and mental health rating is same  Patients states they are never, rarely angry  Patient states they are sometimes unusually tired/fatigued  Pain experienced in the last 7 days has been none  Patient states that he has experienced weight loss or gain in last 6 months  Depression Screening:   PHQ-2 Score: 0      Fall Risk Screening: In the past year, patient has experienced: no history of falling in past year      Home Safety:  Patient has trouble with stairs inside or outside of their home  Patient has working smoke alarms and has no working carbon monoxide detector  Home safety hazards include: none  Nutrition:   Current diet is Regular  Medications:   Patient is not currently taking any over-the-counter supplements  Patient is able to manage medications  Activities of Daily Living (ADLs)/Instrumental Activities of Daily Living (IADLs):   Walk and transfer into and out of bed and chair?: Yes  Dress and groom yourself?: Yes    Bathe or shower yourself?: Yes    Feed yourself?  Yes  Do your laundry/housekeeping?: Yes  Manage your money, pay your bills and track your expenses?: Yes  Make your own meals?: Yes    Do your own shopping?: Yes    Previous Hospitalizations:   Any hospitalizations or ED visits within the last 12 months?: No      Advance Care Planning:   Living will: Yes    Durable POA for healthcare: Yes    Advanced directive: Yes    Advanced directive counseling given: Yes    Five wishes given: Yes      Cognitive Screening:   Provider or family/friend/caregiver concerned regarding cognition?: No    PREVENTIVE SCREENINGS      Cardiovascular Screening:    General: Screening Current      Diabetes Screening:     General: Screening Current      Colorectal Cancer Screening:     General: History Colorectal Cancer      Prostate Cancer Screening:    General: History Prostate Cancer and Screening Not Indicated      Abdominal Aortic Aneurysm (AAA) Screening:    Risk factors include: tobacco use        Lung Cancer Screening:     General: Screening Current    Screening, Brief Intervention, and Referral to Treatment (SBIRT)    Screening  Typical number of drinks in a day: 2  Typical number of drinks in a week: 14  Interpretation: Low risk drinking behavior  Single Item Drug Screening:  How often have you used an illegal drug (including marijuana) or a prescription medication for non-medical reasons in the past year? never    Single Item Drug Screen Score: 0  Interpretation: Negative screen for possible drug use disorder    Other Counseling Topics:   Regular weightbearing exercise and calcium and vitamin D intake         Joanie Layne MD

## 2021-07-26 NOTE — PROGRESS NOTES
Assessment/Plan:     Diagnoses and all orders for this visit:    Moderate protein-calorie malnutrition ([facility] )  -     Hepatitis C antibody; Future  -     CBC and differential; Future  -     Comprehensive metabolic panel; Future    Chronic obstructive pulmonary disease, unspecified COPD type ([facility] )  -     fluticasone-vilanterol (Breo Ellipta) 100-25 mcg/inh inhaler; Inhale 1 puff daily Rinse mouth after use  Aden[facility] of [facility] ([facility] )    Encounter for hepatitis C screening test for low risk patient  -     Hepatitis C antibody; Future    Tobacco abuse    Lung nodules    Prostate cancer (HCC)    Macrocytic anemia  -     Folate; Future  -     Vitamin B12; Future             Subjective:   Chief Complaint   Patient presents with    Follow-up     review labs 2/5/21    Medicare Wellness Visit        Patient ID: Zack Johnson is a 66 y o  male  HPI  [de-identified] years young gentleman who is here today for the regular follow-up the we are following him up for the COPD and also for the multiple lung nodules last CT scan was done in May of 2021  No change in his lung nodules as a matter of fact slight decrease in the size of the lung nodule he does not have any increasing cough still short of breath on exertion but the most concern is weight loss he is active during the summer and also the do lot of manual work but he does not know why he is losing weight  He said his appetite is not good he has does not get that hungry    He is using Breo for his COPD  He drinks about the 2 drinks every night he is still smoking  The following portions of the patient's history were reviewed and updated as appropriate: allergies, current medications, past family history, past medical history, past social history, past surgical history and problem list     Review of Systems   Constitutional: Positive for unexpected weight change (Patient is concerned about the weight loss although he is more active and he is not eating as much as he was eating before he does not have a good appetite)  Negative for appetite change, fatigue and fever  HENT: Negative for congestion, ear pain, hearing loss, nosebleeds, sneezing, tinnitus and voice change  Eyes: Negative for pain, discharge and redness  Respiratory: Positive for shortness of breath (Shortness of breath on exertion)  Negative for cough, chest tightness and wheezing  Cardiovascular: Negative for chest pain, palpitations and leg swelling  Gastrointestinal: Negative for abdominal pain, blood in stool, constipation, diarrhea, nausea and vomiting  Genitourinary: Negative for difficulty urinating, dysuria, hematuria and urgency  Musculoskeletal: Negative for arthralgias, back pain, gait problem and joint swelling  Skin: Negative for rash and wound  Allergic/Immunologic: Negative for environmental allergies  Neurological: Negative for dizziness, tremors, seizures, weakness, light-headedness and numbness  Hematological: Negative for adenopathy  Does not bruise/bleed easily  Psychiatric/Behavioral: Negative for behavioral problems and confusion  The patient is not nervous/anxious            Past Medical History:   Diagnosis Date    COPD (chronic obstructive pulmonary disease) (HCC)     Esophageal reflux     Inguinal hernia     Rectal adenocarcinoma (HCC)     Spermatocele          Current Outpatient Medications:     fluticasone-vilanterol (Breo Ellipta) 100-25 mcg/inh inhaler, Inhale 1 puff daily Rinse mouth after use , Disp: 3 each, Rfl: 1    lansoprazole (PREVACID) 30 mg capsule, Take 1 capsule (30 mg total) by mouth daily (Patient not taking: Reported on 7/26/2021), Disp: 30 capsule, Rfl: 5    Allergies   Allergen Reactions    Penicillins Hives and Other (See Comments)     Other reaction(s): facial swelling  Reaction Unknown       Social History   Past Surgical History:   Procedure Laterality Date    CT NEEDLE BIOPSY LUNG  7/9/2019    HERNIA REPAIR      INSERTION PROSTATE RADIATION SEED      SD INSERT,INFLATABLE PENILE PROSTHESIS N/A 3/8/2016    Procedure: INSERTION OF THREE PIECE PENILE PROSTHESIS;  Surgeon: Ciro Riggs MD;  [facility];  Service: Urology    PROSTATE BIOPSY  01/06/2014     Family History   Problem Relation Age of Onset    No Known Problems Mother     No Known Problems Father        Objective:  /80 (BP Location: Left arm, Patient Position: Sitting, Cuff Size: Adult)   Pulse 63   Temp 98 3 °F (36 8 °C) (Temporal)   Ht 5' 8" (1 727 m)   Wt 56 9 kg (125 lb 6 4 oz)   SpO2 99%   BMI 19 07 kg/m²        Physical Exam  Constitutional:       Appearance: Normal appearance  He is well-developed  HENT:      Head: Normocephalic  Right Ear: External ear normal       Nose: Nose normal    Eyes:      Pupils: Pupils are equal, round, and reactive to light  Neck:      Thyroid: No thyromegaly  Vascular: No JVD  Trachea: No tracheal deviation  Cardiovascular:      Rate and Rhythm: Normal rate and regular rhythm  Heart sounds: Normal heart sounds  Pulmonary:      Breath sounds: Decreased breath sounds present  Abdominal:      General: Bowel sounds are normal       Palpations: Abdomen is soft  Musculoskeletal:         General: No deformity  Cervical back: Normal range of motion and neck supple  Lymphadenopathy:      Cervical: No cervical adenopathy  Skin:     General: Skin is warm and dry  Neurological:      Mental Status: He is alert and oriented to person, place, and time  Psychiatric:         Behavior: Behavior normal          Thought Content:  Thought content normal          Judgment: Judgment normal

## 2021-07-27 LAB
ALBUMIN SERPL-MCNC: 3.8 G/DL (ref 3.6–5.1)
ALBUMIN/GLOB SERPL: 1.2 (CALC) (ref 1–2.5)
ALP SERPL-CCNC: 69 U/L (ref 35–144)
ALT SERPL-CCNC: 10 U/L (ref 9–46)
APPEARANCE UR: CLEAR
AST SERPL-CCNC: 18 U/L (ref 10–35)
BACTERIA UR QL AUTO: NORMAL /HPF
BASOPHILS # BLD AUTO: 38 CELLS/UL (ref 0–200)
BASOPHILS NFR BLD AUTO: 0.5 %
BILIRUB SERPL-MCNC: 0.5 MG/DL (ref 0.2–1.2)
BILIRUB UR QL STRIP: NEGATIVE
BUN SERPL-MCNC: 19 MG/DL (ref 7–25)
BUN/CREAT SERPL: NORMAL (CALC) (ref 6–22)
CALCIUM SERPL-MCNC: 8.9 MG/DL (ref 8.6–10.3)
CHLORIDE SERPL-SCNC: 107 MMOL/L (ref 98–110)
CO2 SERPL-SCNC: 25 MMOL/L (ref 20–32)
COLOR UR: YELLOW
CREAT SERPL-MCNC: 0.81 MG/DL (ref 0.7–1.18)
EOSINOPHIL # BLD AUTO: 91 CELLS/UL (ref 15–500)
EOSINOPHIL NFR BLD AUTO: 1.2 %
ERYTHROCYTE [DISTWIDTH] IN BLOOD BY AUTOMATED COUNT: 12.4 % (ref 11–15)
FOLATE SERPL-MCNC: 8.4 NG/ML
GLOBULIN SER CALC-MCNC: 3.2 G/DL (CALC) (ref 1.9–3.7)
GLUCOSE SERPL-MCNC: 93 MG/DL (ref 65–99)
GLUCOSE UR QL STRIP: NEGATIVE
HCT VFR BLD AUTO: 44.7 % (ref 38.5–50)
HCV AB S/CO SERPL IA: 0.01
HCV AB SERPL QL IA: NORMAL
HGB BLD-MCNC: 14.6 G/DL (ref 13.2–17.1)
HGB UR QL STRIP: NEGATIVE
HYALINE CASTS #/AREA URNS LPF: NORMAL /LPF
KETONES UR QL STRIP: NEGATIVE
LEUKOCYTE ESTERASE UR QL STRIP: NEGATIVE
LYMPHOCYTES # BLD AUTO: 996 CELLS/UL (ref 850–3900)
LYMPHOCYTES NFR BLD AUTO: 13.1 %
MCH RBC QN AUTO: 32.6 PG (ref 27–33)
MCHC RBC AUTO-ENTMCNC: 32.7 G/DL (ref 32–36)
MCV RBC AUTO: 99.8 FL (ref 80–100)
MONOCYTES # BLD AUTO: 874 CELLS/UL (ref 200–950)
MONOCYTES NFR BLD AUTO: 11.5 %
NEUTROPHILS # BLD AUTO: 5601 CELLS/UL (ref 1500–7800)
NEUTROPHILS NFR BLD AUTO: 73.7 %
NITRITE UR QL STRIP: NEGATIVE
PH UR STRIP: 6.5 [PH] (ref 5–8)
PLATELET # BLD AUTO: 260 THOUSAND/UL (ref 140–400)
PMV BLD REES-ECKER: 9.9 FL (ref 7.5–12.5)
POTASSIUM SERPL-SCNC: 4.9 MMOL/L (ref 3.5–5.3)
PROT SERPL-MCNC: 7 G/DL (ref 6.1–8.1)
PROT UR QL STRIP: NEGATIVE
PSA SERPL-MCNC: 0.3 NG/ML
RBC # BLD AUTO: 4.48 MILLION/UL (ref 4.2–5.8)
RBC #/AREA URNS HPF: NORMAL /HPF
SL AMB EGFR AFRICAN AMERICAN: 99 ML/MIN/1.73M2
SL AMB EGFR NON AFRICAN AMERICAN: 86 ML/MIN/1.73M2
SODIUM SERPL-SCNC: 141 MMOL/L (ref 135–146)
SP GR UR STRIP: 1.02 (ref 1–1.03)
SQUAMOUS #/AREA URNS HPF: NORMAL /HPF
VIT B12 SERPL-MCNC: 352 PG/ML (ref 200–1100)
WBC # BLD AUTO: 7.6 THOUSAND/UL (ref 3.8–10.8)
WBC #/AREA URNS HPF: NORMAL /HPF

## 2022-01-25 ENCOUNTER — TELEPHONE (OUTPATIENT)
Dept: INTERNAL MEDICINE CLINIC | Facility: OTHER | Age: 77
End: 2022-01-25

## 2022-01-25 DIAGNOSIS — J44.9 CHRONIC OBSTRUCTIVE PULMONARY DISEASE, UNSPECIFIED COPD TYPE (HCC): ICD-10-CM

## 2022-01-25 RX ORDER — FLUTICASONE FUROATE AND VILANTEROL TRIFENATATE 100; 25 UG/1; UG/1
1 POWDER RESPIRATORY (INHALATION) DAILY
Qty: 3 EACH | Refills: 1 | Status: SHIPPED | OUTPATIENT
Start: 2022-01-25 | End: 2022-02-07

## 2022-02-01 NOTE — TELEPHONE ENCOUNTER
Patient calling again to see if he can have is inhaler switched to something else like mo   Patient has a follow up 2/21/22

## 2022-02-01 NOTE — TELEPHONE ENCOUNTER
Please contact pharmacy if this is okay to switch too  The other medication was sent  He wants 3 month supply

## 2022-02-07 NOTE — TELEPHONE ENCOUNTER
Dr Mitzy Oewns,     Please order Trelegy since [facility] [place]  Thank you!

## 2022-02-07 NOTE — TELEPHONE ENCOUNTER
Patient calling again to see if we are able to change the Breo to Trelegy and send it to [facility] in [place]

## 2022-02-15 ENCOUNTER — RA CDI HCC (OUTPATIENT)
Dept: OTHER | Facility: HOSPITAL | Age: 77
End: 2022-02-15

## 2022-02-15 NOTE — PROGRESS NOTES
[facility]  coding opportunities       Chart reviewed, no opportunity found: CHART REVIEWED, NO OPPORTUNITY FOUND                        Patients insurance company: [facility] [address]  (Medicare Advantage and Commercial)

## 2022-02-21 ENCOUNTER — OFFICE VISIT (OUTPATIENT)
Dept: INTERNAL MEDICINE CLINIC | Facility: OTHER | Age: 77
End: 2022-02-21
Payer: COMMERCIAL

## 2022-02-21 VITALS
SYSTOLIC BLOOD PRESSURE: 120 MMHG | HEART RATE: 62 BPM | OXYGEN SATURATION: 99 % | BODY MASS INDEX: 19.55 KG/M2 | TEMPERATURE: 98.6 F | DIASTOLIC BLOOD PRESSURE: 78 MMHG | WEIGHT: 129 LBS | HEIGHT: 68 IN

## 2022-02-21 DIAGNOSIS — J43.1 PANLOBULAR EMPHYSEMA (HCC): ICD-10-CM

## 2022-02-21 DIAGNOSIS — C61 PROSTATE CANCER (HCC): ICD-10-CM

## 2022-02-21 DIAGNOSIS — R19.4 FREQUENT BOWEL MOVEMENTS: ICD-10-CM

## 2022-02-21 DIAGNOSIS — E44.0 MODERATE PROTEIN-CALORIE MALNUTRITION (HCC): ICD-10-CM

## 2022-02-21 DIAGNOSIS — R91.8 LUNG NODULES: ICD-10-CM

## 2022-02-21 DIAGNOSIS — Z72.0 TOBACCO ABUSE: Primary | ICD-10-CM

## 2022-02-21 PROCEDURE — 3725F SCREEN DEPRESSION PERFORMED: CPT | Performed by: INTERNAL MEDICINE

## 2022-02-21 PROCEDURE — 4004F PT TOBACCO SCREEN RCVD TLK: CPT | Performed by: INTERNAL MEDICINE

## 2022-02-21 PROCEDURE — 99214 OFFICE O/P EST MOD 30 MIN: CPT | Performed by: INTERNAL MEDICINE

## 2022-02-21 PROCEDURE — 1160F RVW MEDS BY RX/DR IN RCRD: CPT | Performed by: INTERNAL MEDICINE

## 2022-02-21 RX ORDER — RABEPRAZOLE SODIUM 20 MG/1
TABLET, DELAYED RELEASE ORAL
COMMUNITY
Start: 2021-12-22 | End: 2022-05-17

## 2022-02-21 RX ORDER — DIPHENOXYLATE HYDROCHLORIDE AND ATROPINE SULFATE 2.5; .025 MG/1; MG/1
1 TABLET ORAL 4 TIMES DAILY PRN
Qty: 30 TABLET | Refills: 0 | Status: SHIPPED | OUTPATIENT
Start: 2022-02-21 | End: 2022-05-17 | Stop reason: SDUPTHER

## 2022-02-21 RX ORDER — FLUTICASONE FUROATE, UMECLIDINIUM BROMIDE AND VILANTEROL TRIFENATATE 100; 62.5; 25 UG/1; UG/1; UG/1
1 POWDER RESPIRATORY (INHALATION) DAILY
Qty: 180 BLISTER | Refills: 1 | Status: SHIPPED | OUTPATIENT
Start: 2022-02-21 | End: 2022-05-17 | Stop reason: SDUPTHER

## 2022-02-21 RX ORDER — FLUTICASONE FUROATE, UMECLIDINIUM BROMIDE AND VILANTEROL TRIFENATATE 100; 62.5; 25 UG/1; UG/1; UG/1
1 POWDER RESPIRATORY (INHALATION) DAILY
Qty: 180 BLISTER | Refills: 1 | Status: SHIPPED | OUTPATIENT
Start: 2022-02-21 | End: 2022-02-21 | Stop reason: SDUPTHER

## 2022-02-21 NOTE — PROGRESS NOTES
Assessment/Plan:     Diagnoses and all orders for this visit:    Tobacco abuse    Panlobular emphysema ([address] )  -     Discontinue: fluticasone-umeclidinium-vilanterol (Trelegy Ellipta) 100-62 5-25 MCG/INH inhaler; Inhale 1 puff daily Rinse mouth after use  -     fluticasone-umeclidinium-vilanterol (Trelegy Ellipta) 100-62 5-25 MCG/INH inhaler; Inhale 1 puff daily Rinse mouth after use  Moderate protein-calorie malnutrition (HCC)    Prostate cancer (HCC)    Lung nodules  -     CT lung screening program; Future    Frequent bowel movements  -     diphenoxylate-atropine (LOMOTIL) 2 5-0 025 mg per tablet; Take 1 tablet by mouth 4 (four) times a day as needed for diarrhea    Other orders  -     RABEprazole (ACIPHEX) 20 MG tablet; Take by mouth          Depression Screening and Follow-up Plan: Patient was screened for depression during today's encounter  They screened negative with a PHQ-2 score of 0  Tobacco Cessation Counseling: Tobacco cessation counseling was provided  The patient is sincerely urged to quit consumption of tobacco  He is not ready to quit tobacco        M*Modal software was used to dictate this note  It may contain errors with dictating incorrect words or incorrect spelling  Please contact the provider directly with any questions  Subjective:   Chief Complaint   Patient presents with    Follow-up     7 month follow up         Patient ID: Chau Cottrell is a 64 y o  male  HPI  [de-identified] years young gentleman very active still smoking does not wanted to quit  Here for the regular follow-up he thinks that the trilogy is helping him for his breathing  But is still he is short of breath and coughing continued to smoke he feel tired but he has the trying to get to apartments done for him  Hypertension is controlled  COPD again discussed about the follow-up CT scan for multiple lung nodules and also smoking cessation  He is not in a mood to do that  Reviewed the blood workup      The following portions of the patient's history were reviewed and updated as appropriate: allergies, current medications, past family history, past medical history, past social history, past surgical history and problem list     Review of Systems   Constitutional: Positive for fatigue  Negative for appetite change and fever  HENT: Negative for congestion, ear pain, hearing loss, nosebleeds, sneezing, tinnitus and voice change  Eyes: Negative for pain, discharge and redness  Respiratory: Positive for cough and shortness of breath  Negative for chest tightness and wheezing  Cardiovascular: Negative for chest pain, palpitations and leg swelling  Gastrointestinal: Negative for abdominal pain, blood in stool, constipation, diarrhea, nausea and vomiting  Genitourinary: Negative for difficulty urinating, dysuria, hematuria and urgency  Musculoskeletal: Negative for arthralgias, back pain, gait problem and joint swelling  Skin: Negative for rash and wound  Allergic/Immunologic: Negative for environmental allergies  Neurological: Negative for dizziness, tremors, seizures, weakness, light-headedness and numbness  Hematological: Negative for adenopathy  Does not bruise/bleed easily  Psychiatric/Behavioral: Negative for behavioral problems and confusion  The patient is not nervous/anxious            Past Medical History:   Diagnosis Date    COPD (chronic obstructive pulmonary disease) (HCC)     Esophageal reflux     Inguinal hernia     Rectal adenocarcinoma (HCC)     Spermatocele          Current Outpatient Medications:     fluticasone-umeclidinium-vilanterol (Trelegy Ellipta) 100-62 5-25 MCG/INH inhaler, Inhale 1 puff daily Rinse mouth after use , Disp: 180 blister, Rfl: 1    lansoprazole (PREVACID) 30 mg capsule, Take 1 capsule (30 mg total) by mouth daily, Disp: 30 capsule, Rfl: 5    mirtazapine (REMERON) 15 mg tablet, Take 1 tablet (15 mg total) by mouth daily at bedtime, Disp: 30 tablet, Rfl: 5    RABEprazole (ACIPHEX) 20 MG tablet, Take by mouth, Disp: , Rfl:     Allergies   Allergen Reactions    Penicillins Hives and Other (See Comments)     Other reaction(s): facial swelling  Reaction Unknown       Social History   Past Surgical History:   Procedure Laterality Date    CT NEEDLE BIOPSY LUNG  7/9/2019    HERNIA REPAIR      INSERTION PROSTATE RADIATION SEED      SD INSERT,INFLATABLE PENILE PROSTHESIS N/A 3/8/2016    Procedure: INSERTION OF THREE PIECE PENILE PROSTHESIS;  Surgeon: Gely Moran MD;  [facility];  Service: Urology    PROSTATE BIOPSY  01/06/2014     Family History   Problem Relation Age of Onset    No Known Problems Mother     No Known Problems Father        Objective:  /78 (BP Location: Left arm, Patient Position: Sitting, Cuff Size: Adult)   Pulse 62   Temp 98 6 °F (37 °C)   Ht 5' 8" (1 727 m)   Wt 58 5 kg (129 lb)   SpO2 99%   BMI 19 61 kg/m²        Physical Exam  Constitutional:       Appearance: He is well-developed  HENT:      Head: Normocephalic  Right Ear: External ear normal       Nose: Nose normal    Eyes:      Pupils: Pupils are equal, round, and reactive to light  Neck:      Thyroid: No thyromegaly  Vascular: No JVD  Trachea: No tracheal deviation  Cardiovascular:      Rate and Rhythm: Normal rate and regular rhythm  Heart sounds: Normal heart sounds  Pulmonary:      Breath sounds: Decreased air movement present  Decreased breath sounds present  Abdominal:      General: Bowel sounds are normal       Palpations: Abdomen is soft  Musculoskeletal:         General: No deformity  Cervical back: Normal range of motion and neck supple  Lymphadenopathy:      Cervical: No cervical adenopathy  Skin:     General: Skin is warm and dry  Neurological:      Mental Status: He is alert and oriented to person, place, and time  Psychiatric:         Behavior: Behavior normal          Thought Content:  Thought content normal          Judgment: Judgment normal

## 2022-03-02 ENCOUNTER — HOSPITAL ENCOUNTER (OUTPATIENT)
Dept: RADIOLOGY | Facility: IMAGING CENTER | Age: 77
Discharge: HOME | End: 2022-03-02
Payer: COMMERCIAL

## 2022-03-02 DIAGNOSIS — Z12.2 SCREENING FOR MALIGNANT NEOPLASM OF RESPIRATORY ORGAN: ICD-10-CM

## 2022-03-02 DIAGNOSIS — R91.8 LUNG NODULES: ICD-10-CM

## 2022-03-02 PROCEDURE — 71271 CT THORAX LUNG CANCER SCR C-: CPT

## 2022-05-17 ENCOUNTER — OFFICE VISIT (OUTPATIENT)
Dept: INTERNAL MEDICINE CLINIC | Facility: OTHER | Age: 77
End: 2022-05-17
Payer: COMMERCIAL

## 2022-05-17 VITALS
HEART RATE: 78 BPM | HEIGHT: 68 IN | TEMPERATURE: 97.3 F | SYSTOLIC BLOOD PRESSURE: 116 MMHG | DIASTOLIC BLOOD PRESSURE: 60 MMHG | WEIGHT: 126 LBS | BODY MASS INDEX: 19.1 KG/M2 | OXYGEN SATURATION: 96 %

## 2022-05-17 DIAGNOSIS — E44.0 MODERATE PROTEIN-CALORIE MALNUTRITION (HCC): ICD-10-CM

## 2022-05-17 DIAGNOSIS — C61 PROSTATE CANCER (HCC): ICD-10-CM

## 2022-05-17 DIAGNOSIS — J43.1 PANLOBULAR EMPHYSEMA (HCC): ICD-10-CM

## 2022-05-17 DIAGNOSIS — Z72.0 TOBACCO ABUSE: Primary | ICD-10-CM

## 2022-05-17 DIAGNOSIS — R19.4 FREQUENT BOWEL MOVEMENTS: ICD-10-CM

## 2022-05-17 PROCEDURE — 4004F PT TOBACCO SCREEN RCVD TLK: CPT | Performed by: INTERNAL MEDICINE

## 2022-05-17 PROCEDURE — 99214 OFFICE O/P EST MOD 30 MIN: CPT | Performed by: INTERNAL MEDICINE

## 2022-05-17 PROCEDURE — 3725F SCREEN DEPRESSION PERFORMED: CPT | Performed by: INTERNAL MEDICINE

## 2022-05-17 PROCEDURE — 1160F RVW MEDS BY RX/DR IN RCRD: CPT | Performed by: INTERNAL MEDICINE

## 2022-05-17 RX ORDER — FLUTICASONE FUROATE, UMECLIDINIUM BROMIDE AND VILANTEROL TRIFENATATE 100; 62.5; 25 UG/1; UG/1; UG/1
1 POWDER RESPIRATORY (INHALATION) DAILY
Qty: 180 BLISTER | Refills: 1 | Status: SHIPPED | OUTPATIENT
Start: 2022-05-17 | End: 2022-08-15

## 2022-05-17 RX ORDER — DIPHENOXYLATE HYDROCHLORIDE AND ATROPINE SULFATE 2.5; .025 MG/1; MG/1
1 TABLET ORAL 4 TIMES DAILY PRN
Qty: 30 TABLET | Refills: 0 | Status: SHIPPED | OUTPATIENT
Start: 2022-05-17

## 2022-05-17 NOTE — PROGRESS NOTES
Assessment/Plan:     Diagnoses and all orders for this visit:    Tobacco abuse  -     CBC and differential; Future  -     CBC and differential    Frequent bowel movements  -     diphenoxylate-atropine (LOMOTIL) 2 5-0 025 mg per tablet; Take 1 tablet by mouth as needed in the morning and 1 tablet as needed at noon and 1 tablet as needed in the evening and 1 tablet as needed before bedtime for diarrhea  Panlobular emphysema (HCC)  -     fluticasone-umeclidinium-vilanterol (Trelegy Ellipta) 100-62 5-25 MCG/INH inhaler; Inhale 1 puff  in the morning  Rinse mouth after use     -     Lipid panel; Future  -     UA w Reflex to Microscopic w Reflex to Culture  -     Lipid panel    Prostate cancer ([facility] [address] )  -     Lipid panel; Future  -     Lipid panel    Moderate protein-calorie malnutrition (HCC)  -     Comprehensive metabolic panel; Future  -     Comprehensive metabolic panel             M*Modal software was used to dictate this note  It may contain errors with dictating incorrect words or incorrect spelling  Please contact the provider directly with any questions  Subjective:   Chief Complaint   Patient presents with    Follow-up     3 month follow up       COPD        Patient ID: Margaret Davidson is a 64 y o  male  HPI  This is a very active 64 years young gentleman who is here today for the regular follow-up he is doing well no new complaints except for a small foreign body in his finger like and splinter I removed    Otherwise he is doing well shortness of breath on exertion diarrhea is better than before since he is taking some Lomotil very much as needed bowel movements are normal no nausea vomiting concerned about the slowly losing weight  The following portions of the patient's history were reviewed and updated as appropriate: allergies, current medications, past family history, past medical history, past social history, past surgical history and problem list     Review of Systems   Constitutional: Nutrition Services/Follow  up Positive for fatigue  Negative for appetite change and fever  HENT: Negative for congestion, ear pain, hearing loss, nosebleeds, sneezing, tinnitus and voice change  Eyes: Negative for pain, discharge and redness  Respiratory: Positive for shortness of breath  Negative for cough, chest tightness and wheezing  Cardiovascular: Negative for chest pain, palpitations and leg swelling  Gastrointestinal: Negative for abdominal pain, blood in stool, constipation, diarrhea, nausea and vomiting  Genitourinary: Negative for difficulty urinating, dysuria, hematuria and urgency  Musculoskeletal: Negative for arthralgias, back pain, gait problem and joint swelling  Skin: Negative for rash and wound  Allergic/Immunologic: Negative for environmental allergies  Neurological: Negative for dizziness, tremors, seizures, weakness, light-headedness and numbness  Hematological: Negative for adenopathy  Does not bruise/bleed easily  Psychiatric/Behavioral: Negative for behavioral problems and confusion  The patient is nervous/anxious  Past Medical History:   Diagnosis Date    COPD (chronic obstructive pulmonary disease) (HCC)     Esophageal reflux     Inguinal hernia     Rectal adenocarcinoma (HCC)     Spermatocele          Current Outpatient Medications:     diphenoxylate-atropine (LOMOTIL) 2 5-0 025 mg per tablet, Take 1 tablet by mouth as needed in the morning and 1 tablet as needed at noon and 1 tablet as needed in the evening and 1 tablet as needed before bedtime for diarrhea , Disp: 30 tablet, Rfl: 0    fluticasone-umeclidinium-vilanterol (Trelegy Ellipta) 100-62 5-25 MCG/INH inhaler, Inhale 1 puff  in the morning  Rinse mouth after use   , Disp: 180 blister, Rfl: 1    Allergies   Allergen Reactions    Penicillins Hives and Other (See Comments)     Other reaction(s): facial swelling  Reaction Unknown       Social History   Past Surgical History:   Procedure Laterality Date    CT NEEDLE BIOPSY LUNG  7/9/2019    HERNIA REPAIR      INSERTION PROSTATE RADIATION SEED      [place] INSERT,INFLATABLE PENILE PROSTHESIS N/A 3/8/2016    Procedure: INSERTION OF THREE PIECE PENILE PROSTHESIS;  Surgeon: Eula Cortez MD;  [facility];  Service: Urology    PROSTATE BIOPSY  01/06/2014     Family History   Problem Relation Age of Onset    No Known Problems Mother     No Known Problems Father        Objective:  /60 (BP Location: Left arm, Patient Position: Sitting, Cuff Size: Adult)   Pulse 78   Temp (!) 97 3 °F (36 3 °C) (Temporal)   Ht 5' 8" (1 727 m)   Wt 57 2 kg (126 lb)   SpO2 96%   BMI 19 16 kg/m²        Physical Exam  Constitutional:       Appearance: He is well-developed  HENT:      Head: Normocephalic  Right Ear: External ear normal       Nose: Nose normal    Eyes:      Pupils: Pupils are equal, round, and reactive to light  Neck:      Thyroid: No thyromegaly  Vascular: No JVD  Trachea: No tracheal deviation  Cardiovascular:      Rate and Rhythm: Normal rate and regular rhythm  Heart sounds: Normal heart sounds  Pulmonary:      Breath sounds: Decreased breath sounds present  Abdominal:      General: Bowel sounds are normal       Palpations: Abdomen is soft  Musculoskeletal:         General: No deformity  Cervical back: Normal range of motion and neck supple  Lymphadenopathy:      Cervical: No cervical adenopathy  Skin:     General: Skin is warm and dry  Neurological:      Mental Status: He is alert and oriented to person, place, and time  Psychiatric:         Behavior: Behavior normal          Thought Content:  Thought content normal          Judgment: Judgment normal

## 2022-08-25 DIAGNOSIS — J43.1 PANLOBULAR EMPHYSEMA (HCC): ICD-10-CM

## 2022-11-01 ENCOUNTER — APPOINTMENT (OUTPATIENT)
Dept: LAB | Facility: IMAGING CENTER | Age: 77
End: 2022-11-01

## 2022-11-01 DIAGNOSIS — E44.0 MALNUTRITION OF MODERATE DEGREE (HCC): ICD-10-CM

## 2022-11-01 DIAGNOSIS — Z72.0 TOBACCO ABUSE: ICD-10-CM

## 2022-11-01 DIAGNOSIS — C61 MALIGNANT NEOPLASM OF PROSTATE (HCC): ICD-10-CM

## 2022-11-01 DIAGNOSIS — J43.1 PANACINAR EMPHYSEMA (HCC): ICD-10-CM

## 2022-11-01 LAB
ALBUMIN SERPL BCP-MCNC: 3.4 G/DL (ref 3.5–5)
ALP SERPL-CCNC: 70 U/L (ref 46–116)
ALT SERPL W P-5'-P-CCNC: 20 U/L (ref 12–78)
ANION GAP SERPL CALCULATED.3IONS-SCNC: 2 MMOL/L (ref 4–13)
AST SERPL W P-5'-P-CCNC: 21 U/L (ref 5–45)
BASOPHILS # BLD AUTO: 0.05 THOUSANDS/ÂΜL (ref 0–0.1)
BASOPHILS NFR BLD AUTO: 1 % (ref 0–1)
BILIRUB SERPL-MCNC: 0.9 MG/DL (ref 0.2–1)
BUN SERPL-MCNC: 16 MG/DL (ref 5–25)
CALCIUM ALBUM COR SERPL-MCNC: 9.4 MG/DL (ref 8.3–10.1)
CALCIUM SERPL-MCNC: 8.9 MG/DL (ref 8.3–10.1)
CHLORIDE SERPL-SCNC: 109 MMOL/L (ref 96–108)
CHOLEST SERPL-MCNC: 157 MG/DL
CO2 SERPL-SCNC: 26 MMOL/L (ref 21–32)
CREAT SERPL-MCNC: 0.89 MG/DL (ref 0.6–1.3)
EOSINOPHIL # BLD AUTO: 0.11 THOUSAND/ÂΜL (ref 0–0.61)
EOSINOPHIL NFR BLD AUTO: 1 % (ref 0–6)
ERYTHROCYTE [DISTWIDTH] IN BLOOD BY AUTOMATED COUNT: 13.6 % (ref 11.6–15.1)
GFR SERPL CREATININE-BSD FRML MDRD: 81 ML/MIN/1.73SQ M
GLUCOSE P FAST SERPL-MCNC: 95 MG/DL (ref 65–99)
HCT VFR BLD AUTO: 43.4 % (ref 36.5–49.3)
HDLC SERPL-MCNC: 78 MG/DL
HGB BLD-MCNC: 13.9 G/DL (ref 12–17)
IMM GRANULOCYTES # BLD AUTO: 0.03 THOUSAND/UL (ref 0–0.2)
IMM GRANULOCYTES NFR BLD AUTO: 0 % (ref 0–2)
LDLC SERPL CALC-MCNC: 71 MG/DL (ref 0–100)
LYMPHOCYTES # BLD AUTO: 0.99 THOUSANDS/ÂΜL (ref 0.6–4.47)
LYMPHOCYTES NFR BLD AUTO: 12 % (ref 14–44)
MCH RBC QN AUTO: 33.1 PG (ref 26.8–34.3)
MCHC RBC AUTO-ENTMCNC: 32 G/DL (ref 31.4–37.4)
MCV RBC AUTO: 103 FL (ref 82–98)
MONOCYTES # BLD AUTO: 0.91 THOUSAND/ÂΜL (ref 0.17–1.22)
MONOCYTES NFR BLD AUTO: 11 % (ref 4–12)
NEUTROPHILS # BLD AUTO: 6.12 THOUSANDS/ÂΜL (ref 1.85–7.62)
NEUTS SEG NFR BLD AUTO: 75 % (ref 43–75)
NONHDLC SERPL-MCNC: 79 MG/DL
NRBC BLD AUTO-RTO: 0 /100 WBCS
PLATELET # BLD AUTO: 243 THOUSANDS/UL (ref 149–390)
PMV BLD AUTO: 9.8 FL (ref 8.9–12.7)
POTASSIUM SERPL-SCNC: 4.4 MMOL/L (ref 3.5–5.3)
PROT SERPL-MCNC: 7.3 G/DL (ref 6.4–8.4)
RBC # BLD AUTO: 4.2 MILLION/UL (ref 3.88–5.62)
SODIUM SERPL-SCNC: 137 MMOL/L (ref 135–147)
TRIGL SERPL-MCNC: 41 MG/DL
WBC # BLD AUTO: 8.21 THOUSAND/UL (ref 4.31–10.16)

## 2022-11-15 ENCOUNTER — OFFICE VISIT (OUTPATIENT)
Dept: INTERNAL MEDICINE CLINIC | Facility: OTHER | Age: 77
End: 2022-11-15

## 2022-11-15 VITALS
SYSTOLIC BLOOD PRESSURE: 144 MMHG | OXYGEN SATURATION: 91 % | TEMPERATURE: 97.5 F | BODY MASS INDEX: 18.43 KG/M2 | WEIGHT: 121.6 LBS | HEIGHT: 68 IN | RESPIRATION RATE: 20 BRPM | DIASTOLIC BLOOD PRESSURE: 76 MMHG | HEART RATE: 54 BPM

## 2022-11-15 DIAGNOSIS — Z23 NEEDS FLU SHOT: ICD-10-CM

## 2022-11-15 DIAGNOSIS — C61 PROSTATE CANCER (HCC): ICD-10-CM

## 2022-11-15 DIAGNOSIS — R19.4 FREQUENT BOWEL MOVEMENTS: ICD-10-CM

## 2022-11-15 DIAGNOSIS — J43.1 PANLOBULAR EMPHYSEMA (HCC): ICD-10-CM

## 2022-11-15 DIAGNOSIS — Z00.00 MEDICARE ANNUAL WELLNESS VISIT, SUBSEQUENT: ICD-10-CM

## 2022-11-15 DIAGNOSIS — N52.35 ERECTILE DYSFUNCTION FOLLOWING RADIATION THERAPY: ICD-10-CM

## 2022-11-15 DIAGNOSIS — M16.11 PRIMARY OSTEOARTHRITIS OF RIGHT HIP: ICD-10-CM

## 2022-11-15 DIAGNOSIS — J40 BRONCHITIS: ICD-10-CM

## 2022-11-15 DIAGNOSIS — Z72.0 TOBACCO ABUSE: ICD-10-CM

## 2022-11-15 DIAGNOSIS — E44.0 MODERATE PROTEIN-CALORIE MALNUTRITION (HCC): ICD-10-CM

## 2022-11-15 DIAGNOSIS — Z12.2 SCREENING FOR LUNG CANCER: Primary | ICD-10-CM

## 2022-11-15 RX ORDER — LEVOFLOXACIN 250 MG/1
250 TABLET ORAL EVERY 24 HOURS
Qty: 5 TABLET | Refills: 0 | Status: SHIPPED | OUTPATIENT
Start: 2022-11-15 | End: 2022-11-20

## 2022-11-15 NOTE — PROGRESS NOTES
Assessment and Plan:     Problem List Items Addressed This Visit        Respiratory    Chronic obstructive pulmonary disease ([address] )      Other Visit Diagnoses     Screening for lung cancer    -  Primary    Frequent bowel movements            BMI Counseling: Body mass index is 18 49 kg/m²  The BMI is below normal  Patient advised to gain weight and dietary education for weight gain was provided  Rationale for BMI follow-up plan is due to patient being underweight  Depression Screening and Follow-up Plan: Patient was screened for depression during today's encounter  They screened negative with a PHQ-2 score of 0  Preventive health issues were discussed with patient, and age appropriate screening tests were ordered as noted in patient's After Visit Summary  Personalized health advice and appropriate referrals for health education or preventive services given if needed, as noted in patient's After Visit Summary  History of Present Illness:     Patient presents for a Medicare Wellness Visit    This is a very pleasant 64 years young gentleman a history of severe emphysema and the right now complaining of some cough bringing up yellow sputum no hemoptysis no fever or chills no pleuritic type of chest pain on physical examination decreased breath sounds bilaterally  Plan is to give him some antibiotic for acute exacerbation of COPD plus acute bronchitis  He is on surveillance for lung cancer because of his the persistent smoking for many years he is scheduled to go for the next CT scan in March he continue to smoke discussed about smoking cessation      Discussed about the weight loss when the low protein gave him lot of instructions to gain some weight he lost about 20 lb in last 2 years he said that he does not have a good appetite and he does not take the time to eat and he is working and more active than usual    Also complaining of right hip pain x-ray of the hip was done in 2019 which shows moderately severe osteoarthritis of the hip I think he need to see the orthopedic surgeon he may need hip replacement I will get an x-ray of the hip again and recommend him to go to the Orthopedics for further evaluation and treatment  Patient Care Team:  Tj Vergara MD as PCP - General (Internal Medicine)     Review of Systems:     Review of Systems   Constitutional: Positive for unexpected weight change (Discussed about the weight loss he said he does not have much appetite he does lot of activities and he does not eat well)  Negative for appetite change, fatigue and fever  HENT: Negative for congestion, ear pain, hearing loss, nosebleeds, sneezing, tinnitus and voice change  Eyes: Negative for pain, discharge and redness  Respiratory: Positive for cough and shortness of breath  Negative for chest tightness and wheezing  Cardiovascular: Negative for chest pain, palpitations and leg swelling  Gastrointestinal: Negative for abdominal pain, blood in stool, constipation, diarrhea, nausea and vomiting  Genitourinary: Negative for difficulty urinating, dysuria, hematuria and urgency  Musculoskeletal: Negative for arthralgias, back pain, gait problem and joint swelling  Skin: Negative for rash and wound  Allergic/Immunologic: Negative for environmental allergies  Neurological: Negative for dizziness, tremors, seizures, weakness, light-headedness and numbness  Hematological: Negative for adenopathy  Does not bruise/bleed easily  Psychiatric/Behavioral: Negative for behavioral problems and confusion  The patient is nervous/anxious           Problem List:     Patient Active Problem List   Diagnosis   • Smith esophagus   • Chronic obstructive pulmonary disease ([facility] )   • Erectile dysfunction   • Tobacco abuse   • Esophageal reflux   • Lung nodules   • Hiatal hernia   • Elevated prostate specific antigen (PSA)   • Prostate cancer ([facility] )   • Medicare annual wellness visit, subsequent   • Right hip pain • Osteoarthritis of one hip, right   • Moderate protein-calorie malnutrition (HCC)      Past Medical and Surgical History:     Past Medical History:   Diagnosis Date   • COPD (chronic obstructive pulmonary disease) (HCC)    • Esophageal reflux    • Inguinal hernia    • Rectal adenocarcinoma (HCC)    • Spermatocele      Past Surgical History:   Procedure Laterality Date   • CT NEEDLE BIOPSY LUNG  7/9/2019   • HERNIA REPAIR     • INSERTION PROSTATE RADIATION SEED     • [place] INSERT,INFLATABLE PENILE PROSTHESIS N/A 3/8/2016    Procedure: INSERTION OF THREE PIECE PENILE PROSTHESIS;  Surgeon: Mary Danielle MD;  [facility]: [facility];  Service: Urology   • PROSTATE BIOPSY  01/06/2014      Family History:     Family History   Problem Relation Age of Onset   • No Known Problems Mother    • No Known Problems Father       Social History:     Social History     Socioeconomic History   • Marital status: Single     Spouse name: None   • Number of children: None   • Years of education: None   • Highest education level: None   Occupational History   • None   Tobacco Use   • Smoking status: Current Every Day Smoker     Packs/day: 0 50     Years: 64 00     Pack years: 32 00   • Smokeless tobacco: Never Used   Vaping Use   • Vaping Use: Never used   Substance and Sexual Activity   • Alcohol use: Yes     Comment: 2 a day   • Drug use: No   • Sexual activity: Not Currently   Other Topics Concern   • None   Social History Narrative   • None     Social Determinants of Health     Financial Resource Strain: Not on file   Food Insecurity: Not on file   Transportation Needs: Not on file   Physical Activity: Not on file   Stress: Not on file   Social Connections: Not on file   Intimate Partner Violence: Not on file   Housing Stability: Not on file      Medications and Allergies:     Current Outpatient Medications   Medication Sig Dispense Refill   • fluticasone-umeclidinium-vilanterol (Trelegy Ellipta) 100-62 5-25 MCG/INH inhaler Inhale 1 puff daily Rinse mouth after use  180 blister 1   • diphenoxylate-atropine (LOMOTIL) 2 5-0 025 mg per tablet Take 1 tablet by mouth as needed in the morning and 1 tablet as needed at noon and 1 tablet as needed in the evening and 1 tablet as needed before bedtime for diarrhea  (Patient not taking: Reported on 11/15/2022) 30 tablet 0     No current facility-administered medications for this visit  Allergies   Allergen Reactions   • Penicillins Hives and Other (See Comments)     Other reaction(s): facial swelling  Reaction Unknown      Immunizations:     Immunization History   Administered Date(s) Administered   • Influenza, seasonal, injectable 10/10/2011   • Pneumococcal Conjugate 13-Valent 05/30/2017   • Pneumococcal Polysaccharide PPV23 10/10/2011   • Td (adult), adsorbed 04/01/2008      Health Maintenance:         Topic Date Due   • Lung Cancer Screening  03/02/2023   • Hepatitis C Screening  Completed         Topic Date Due   • COVID-19 Vaccine (1) Never done   • Influenza Vaccine (1) 09/01/2022      Medicare Screening Tests and Risk Assessments:     Michael Jackson is here for his Subsequent Wellness visit  Last Medicare Wellness visit information reviewed, patient interviewed and updates made to the record today  Health Risk Assessment:   Patient rates overall health as good  Patient feels that their physical health rating is same  Patient is satisfied with their life  Eyesight was rated as same  Hearing was rated as same  Patient feels that their emotional and mental health rating is same  Patients states they are never, rarely angry  Patient states they are sometimes unusually tired/fatigued  Pain experienced in the last 7 days has been some  Patient's pain rating has been 6/10  Patient states that he has experienced weight loss or gain in last 6 months  Depression Screening:   PHQ-2 Score: 0      Fall Risk Screening:    In the past year, patient has experienced: no history of falling in past year Home Safety:  Patient has trouble with stairs inside or outside of their home  Patient has working smoke alarms and has working carbon monoxide detector  Home safety hazards include: none  Nutrition:   Current diet is Regular  Medications:   Patient is not currently taking any over-the-counter supplements  Patient is able to manage medications  Activities of Daily Living (ADLs)/Instrumental Activities of Daily Living (IADLs):   Walk and transfer into and out of bed and chair?: Yes  Dress and groom yourself?: Yes    Bathe or shower yourself?: Yes    Feed yourself? Yes  Do your laundry/housekeeping?: Yes  Manage your money, pay your bills and track your expenses?: Yes  Make your own meals?: Yes    Do your own shopping?: Yes    Previous Hospitalizations:   Any hospitalizations or ED visits within the last 12 months?: No      Advance Care Planning:   Living will: Yes    Durable POA for healthcare: Yes    Advanced directive: Yes    Advanced directive counseling given: Yes      Cognitive Screening:   Provider or family/friend/caregiver concerned regarding cognition?: No    PREVENTIVE SCREENINGS      Cardiovascular Screening:    General: Screening Current      Diabetes Screening:     General: Screening Current      Colorectal Cancer Screening:     General: History Colorectal Cancer      Prostate Cancer Screening:    General: History Prostate Cancer and Screening Not Indicated      Osteoporosis Screening:    General: Patient Declines      Abdominal Aortic Aneurysm (AAA) Screening:    Risk factors include: tobacco use        General: Screening Current      Lung Cancer Screening:     General: Screening Current      Hepatitis C Screening:    General: Screening Current    Screening, Brief Intervention, and Referral to Treatment (SBIRT)    Screening  Typical number of drinks in a day: 2  Typical number of drinks in a week: 14  Interpretation: Low risk drinking behavior      Single Item Drug Screening:  How often have you used an illegal drug (including marijuana) or a prescription medication for non-medical reasons in the past year? never    Single Item Drug Screen Score: 0  Interpretation: Negative screen for possible drug use disorder    Other Counseling Topics:   Calcium and vitamin D intake and regular weightbearing exercise  No exam data present     Physical Exam:     Ht 5' 8" (1 727 m)   Wt 55 2 kg (121 lb 9 6 oz)   BMI 18 49 kg/m²     Physical Exam  Vitals and nursing note reviewed  Constitutional:       Appearance: Normal appearance  He is underweight  HENT:      Head: Normocephalic and atraumatic  Right Ear: Tympanic membrane normal       Left Ear: Tympanic membrane normal       Nose: Nose normal       Mouth/Throat:      Mouth: Mucous membranes are moist    Eyes:      Extraocular Movements: Extraocular movements intact  Pupils: Pupils are equal, round, and reactive to light  Cardiovascular:      Rate and Rhythm: Normal rate and regular rhythm  Pulses: Normal pulses  Heart sounds: Normal heart sounds  Pulmonary:      Breath sounds: Decreased air movement present  Decreased breath sounds present  Abdominal:      General: Abdomen is flat  Bowel sounds are normal       Palpations: Abdomen is soft  Musculoskeletal:         General: No swelling, tenderness or deformity  Normal range of motion  Cervical back: Normal range of motion and neck supple  Skin:     General: Skin is warm  Capillary Refill: Capillary refill takes 2 to 3 seconds  Neurological:      General: No focal deficit present  Mental Status: He is alert and oriented to person, place, and time  Mental status is at baseline  Psychiatric:         Mood and Affect: Mood normal          Behavior: Behavior normal  Behavior is cooperative            Marky Regan MD

## 2022-11-21 ENCOUNTER — OFFICE VISIT (OUTPATIENT)
Dept: OBGYN CLINIC | Facility: CLINIC | Age: 77
End: 2022-11-21

## 2022-11-21 VITALS
SYSTOLIC BLOOD PRESSURE: 130 MMHG | BODY MASS INDEX: 18.34 KG/M2 | WEIGHT: 121 LBS | HEIGHT: 68 IN | DIASTOLIC BLOOD PRESSURE: 72 MMHG

## 2022-11-21 DIAGNOSIS — M16.11 PRIMARY OSTEOARTHRITIS OF RIGHT HIP: Primary | ICD-10-CM

## 2022-11-21 NOTE — PROGRESS NOTES
Patient Name:  Lacie Mcdonald  MRN:  [id number]    Assessment & Plan     Right hip severe DJD  1  Referral placed for intra-articular corticosteroid injection into the right hip with Radiology  2  Continue Aleve as needed  3  Activities as tolerated with modification to avoid pain  4  Patient may follow-up in three months with primary care sports medicine  Briefly discussed referral to joint replacement specialist if symptoms worsen  Chief Complaint     Right hip pain    History of the Present Illness     Lacie Mcdonald is a 64 y o  male who reports to the office today for evaluation of his right hip  He does have a chronic history of hip pain which has been ongoing for a number of years  He denies any specific injury or trauma  He notes worsening pain recently over the past few months  He denies any recent injury or trauma  Pain is localized primarily to the anterior hip and groin  Pain is worse with increased activity  He notes occasional stiffness and giving way in the hip  No numbness or tingling  No fevers or chills  He does take Aleve intermittently with relief  He did try physical therapy a few years ago which provided minimal relief  Physical Exam     /72   Ht 5' 8" (1 727 m)   Wt 54 9 kg (121 lb)   BMI 18 40 kg/m²     Right hip:  No gross deformity  No tenderness to palpation anterior hip and greater trochanter  Hip range of motion is flexion 95, external rotation 20, internal rotation 10  Negative LAINE and FADDIR test   Negative logroll test   Negative straight leg raise and resisted straight leg raise test       Eyes: Anicteric sclerae  ENT: Trachea midline  Lungs: Normal respiratory effort  CV: Capillary refill is less than 2 seconds  Skin: Intact without erythema  Lymph: No palpable lymphadenopathy  Neuro: Sensation is grossly intact to light touch  Psych: Mood and affect are appropriate      Data Review     I have personally reviewed pertinent films in PACS, and my interpretation follows:    X-rays right hip 22:  No acute osseous abnormalities  No fracture or dislocation  Severe degenerative changes right hip joint  Visualized lower lumbar spine exhibits multilevel DDD as well  Past Medical History:   Diagnosis Date   • COPD (chronic obstructive pulmonary disease) ([facility] [address] )    • Esophageal reflux    • Inguinal hernia    • Rectal adenocarcinoma ([facility] [address] )    • Spermatocele        Past Surgical History:   Procedure Laterality Date   • CT NEEDLE BIOPSY LUNG  2019   • HERNIA REPAIR     • INSERTION PROSTATE RADIATION SEED     • [place] INSERT,INFLATABLE PENILE PROSTHESIS N/A 3/8/2016    Procedure: INSERTION OF THREE PIECE PENILE PROSTHESIS;  Surgeon: Khang Ramirez MD;  [facility];  Service: Urology   • PROSTATE BIOPSY  2014       Allergies   Allergen Reactions   • Penicillins Hives and Other (See Comments)     Other reaction(s): facial swelling  Reaction Unknown       Current Outpatient Medications on File Prior to Visit   Medication Sig Dispense Refill   • fluticasone-umeclidinium-vilanterol (Trelegy Ellipta) 100-62 5-25 mcg/actuation inhaler Inhale 1 puff daily Rinse mouth after use  180 blister 1   • [] levofloxacin (LEVAQUIN) 250 mg tablet Take 1 tablet (250 mg total) by mouth every 24 hours for 5 days 5 tablet 0     No current facility-administered medications on file prior to visit  Social History     Tobacco Use   • Smoking status: Every Day     Packs/day: 0 50     Years: 64 00     Pack years: 32 00     Types: Cigarettes   • Smokeless tobacco: Never   Vaping Use   • Vaping Use: Never used   Substance Use Topics   • Alcohol use: Yes     Comment: 2 a day   • Drug use: No       Family History   Problem Relation Age of Onset   • No Known Problems Mother    • No Known Problems Father        Review of Systems     As stated in the HPI  All other systems reviewed and are negative

## 2022-11-25 ENCOUNTER — HOSPITAL ENCOUNTER (OUTPATIENT)
Dept: RADIOLOGY | Facility: HOSPITAL | Age: 77
Discharge: HOME | End: 2022-11-25

## 2022-11-25 DIAGNOSIS — M16.11 PRIMARY OSTEOARTHRITIS OF RIGHT HIP: ICD-10-CM

## 2022-11-25 RX ORDER — METHYLPREDNISOLONE ACETATE 80 MG/ML
80 INJECTION, SUSPENSION INTRA-ARTICULAR; INTRALESIONAL; INTRAMUSCULAR; SOFT TISSUE
Status: COMPLETED | OUTPATIENT
Start: 2022-11-25 | End: 2022-11-25

## 2022-11-25 RX ORDER — BUPIVACAINE HYDROCHLORIDE 2.5 MG/ML
10 INJECTION, SOLUTION EPIDURAL; INFILTRATION; INTRACAUDAL
Status: COMPLETED | OUTPATIENT
Start: 2022-11-25 | End: 2022-11-25

## 2022-11-25 RX ORDER — LIDOCAINE HYDROCHLORIDE 10 MG/ML
5 INJECTION, SOLUTION EPIDURAL; INFILTRATION; INTRACAUDAL; PERINEURAL
Status: COMPLETED | OUTPATIENT
Start: 2022-11-25 | End: 2022-11-25

## 2022-11-25 RX ADMIN — METHYLPREDNISOLONE ACETATE 80 MG: 80 INJECTION, SUSPENSION INTRA-ARTICULAR; INTRALESIONAL; INTRAMUSCULAR; SOFT TISSUE at 09:41

## 2022-11-25 RX ADMIN — IOHEXOL 3 ML: 300 INJECTION, SOLUTION INTRAVENOUS at 09:39

## 2022-11-25 RX ADMIN — LIDOCAINE HYDROCHLORIDE 3 ML: 10 INJECTION, SOLUTION EPIDURAL; INFILTRATION; INTRACAUDAL; PERINEURAL at 09:41

## 2022-11-25 RX ADMIN — BUPIVACAINE HYDROCHLORIDE 3 ML: 2.5 INJECTION, SOLUTION EPIDURAL; INFILTRATION; INTRACAUDAL; PERINEURAL at 09:40

## 2022-12-05 ENCOUNTER — TELEPHONE (OUTPATIENT)
Dept: RADIOLOGY | Facility: HOSPITAL | Age: 77
End: 2022-12-05

## 2022-12-05 ENCOUNTER — TELEPHONE (OUTPATIENT)
Dept: OBGYN CLINIC | Facility: HOSPITAL | Age: 77
End: 2022-12-05

## 2022-12-05 NOTE — TELEPHONE ENCOUNTER
Caller: Zack Barrera    Doctor: Celine    Reason for call: received shot in R hip and states it is worse would like to discuss next steps     Call back#: [phone]

## 2022-12-05 NOTE — TELEPHONE ENCOUNTER
Mr Justa White underwent a fluoroscopy guided right hip steroid and anesthetic injection on 11/25/22  Please see separate report for full procedure details  Mr Delfino Lema called today noting that he has continued right hip pain, and the "shot didn't work"  He feels that he is limping due to the pain which hadn't occurred before  He denies any fevers, chills, skin erythema, drainage from the incision site, or additional acute complaints  At this time I directed Mr Delfino Lema to contact the orthopedic team to discuss the above  I notified Mr Delfino Lema to report to his primary care physician or ER if his pain persists and/ or is associated with any symptoms such as fevers or chills       [facility]

## 2022-12-05 NOTE — TELEPHONE ENCOUNTER
He should see a joint replacement specialist, Dr Jyoti Price or Dr Tamia Banda to discuss further options if injection did not work    thanks

## 2022-12-06 DIAGNOSIS — M16.11 PRIMARY OSTEOARTHRITIS OF ONE HIP, RIGHT: Primary | ICD-10-CM

## 2022-12-06 RX ORDER — MELOXICAM 7.5 MG/1
7.5 TABLET ORAL DAILY
Qty: 30 TABLET | Refills: 0 | Status: SHIPPED | OUTPATIENT
Start: 2022-12-06

## 2022-12-06 NOTE — TELEPHONE ENCOUNTER
Patient provided above information and verbalized understanding   He will not take any other NSAIDS while taking Meloxicam

## 2022-12-06 NOTE — TELEPHONE ENCOUNTER
Patient states that he would not really be interested in hip replacement at this time  He would like to try medication first   Could something be called into pharmacy for him?

## 2023-02-24 ENCOUNTER — OFFICE VISIT (OUTPATIENT)
Dept: OBGYN CLINIC | Facility: CLINIC | Age: 78
End: 2023-02-24

## 2023-02-24 VITALS
DIASTOLIC BLOOD PRESSURE: 72 MMHG | SYSTOLIC BLOOD PRESSURE: 132 MMHG | HEART RATE: 80 BPM | BODY MASS INDEX: 18.4 KG/M2 | WEIGHT: 121 LBS

## 2023-02-24 DIAGNOSIS — M19.012 PRIMARY OSTEOARTHRITIS OF LEFT SHOULDER: ICD-10-CM

## 2023-02-24 DIAGNOSIS — M16.11 PRIMARY OSTEOARTHRITIS OF ONE HIP, RIGHT: ICD-10-CM

## 2023-02-24 DIAGNOSIS — M25.551 CHRONIC PAIN OF RIGHT HIP: Primary | ICD-10-CM

## 2023-02-24 DIAGNOSIS — M25.512 ACUTE PAIN OF LEFT SHOULDER: ICD-10-CM

## 2023-02-24 DIAGNOSIS — Z98.890 HISTORY OF REPAIR OF LEFT ROTATOR CUFF: ICD-10-CM

## 2023-02-24 DIAGNOSIS — G89.29 CHRONIC PAIN OF RIGHT HIP: Primary | ICD-10-CM

## 2023-02-24 DIAGNOSIS — M75.82 TENDINITIS OF LEFT ROTATOR CUFF: ICD-10-CM

## 2023-02-24 RX ORDER — METHYLPREDNISOLONE 4 MG/1
TABLET ORAL
Qty: 21 TABLET | Refills: 0 | Status: SHIPPED | OUTPATIENT
Start: 2023-02-24

## 2023-02-24 NOTE — PROGRESS NOTES
Assessment/Plan:    Diagnoses and all orders for this visit:    Chronic pain of right hip  -     methylprednisolone (MEDROL) 4 mg tablet; Medrol dose pack, take as directed  -     Ambulatory Referral to Orthopedic Surgery; Future    Primary osteoarthritis of one hip, right  -     methylprednisolone (MEDROL) 4 mg tablet; Medrol dose pack, take as directed  -     Ambulatory Referral to Orthopedic Surgery; Future    Acute pain of left shoulder  -     XR shoulder 2+ vw left; Future  -     methylprednisolone (MEDROL) 4 mg tablet; Medrol dose pack, take as directed    History of repair of left rotator cuff    Primary osteoarthritis of left shoulder    Tendinitis of left rotator cuff    Referred to orthopedic surgeon to discuss Right MANDO for severe OA failed CSI, Declines PT  Medrol dose pack for pain of shoulder and hip, allergic to Mobic    Return if symptoms worsen or fail to improve  Subjective:   Patient ID: Kris Mayes is a 76 y o  male  Patient returns status post fluoroscopic guided intra-articular and steroid injection of the right hip with NO benefit  Took Meloxicam however he experienced a itchy rash  Also with left shoulder pain interfering with sleep , hx b/l shoulder RTC repairs    Initial note with Ortho Clinic PA:  iris Flowers is a 64 y o  male who reports to the office today for evaluation of his right hip  He does have a chronic history of hip pain which has been ongoing for a number of years  He denies any specific injury or trauma  He notes worsening pain recently over the past few months  He denies any recent injury or trauma  Pain is localized primarily to the anterior hip and groin  Pain is worse with increased activity  He notes occasional stiffness and giving way in the hip  No numbness or tingling  No fevers or chills  He does take Aleve intermittently with relief  He did try physical therapy a few years ago which provided minimal relief        Review of Systems    The following portions of the patient's chart were reviewed and updated as appropriate: Allergy:    Allergies   Allergen Reactions   • Penicillins Hives and Other (See Comments)     Other reaction(s): facial swelling  Reaction Unknown   • Meloxicam Rash       Medications:    Current Outpatient Medications:   •  methylprednisolone (MEDROL) 4 mg tablet, Medrol dose pack, take as directed, Disp: 21 tablet, Rfl: 0  •  fluticasone-umeclidinium-vilanterol (Trelegy Ellipta) 100-62 5-25 mcg/actuation inhaler, Inhale 1 puff daily Rinse mouth after use , Disp: 180 blister, Rfl: 1    Patient Active Problem List   Diagnosis   • Smith esophagus   • Chronic obstructive pulmonary disease (HCC)   • Erectile dysfunction   • Tobacco abuse   • Esophageal reflux   • Lung nodules   • Hiatal hernia   • Elevated prostate specific antigen (PSA)   • Prostate cancer ([facility] )   • Medicare annual wellness visit, subsequent   • Right hip pain   • Osteoarthritis of one hip, right   • Moderate protein-calorie malnutrition (HCC)       Objective:  /72   Pulse 80   Wt 54 9 kg (121 lb)   BMI 18 40 kg/m²     Ortho Exam    Physical Exam      Neurologic Exam    Procedures    I have personally reviewed the written report of the pertinent studies         Xray Left Shoulder:        Past Medical History:   Diagnosis Date   • COPD (chronic obstructive pulmonary disease) ([facility] [address] )    • Esophageal reflux    • Inguinal hernia    • Rectal adenocarcinoma ([facility] [address] )    • Spermatocele        Past Surgical History:   Procedure Laterality Date   • CT NEEDLE BIOPSY LUNG  7/9/2019   • FL INJECTION RIGHT HIP (NON ARTHROGRAM)  11/25/2022   • HERNIA REPAIR     • INSERTION PROSTATE RADIATION SEED     • [place] INSJ MULTI-COMPONENT INFLATABLE PENILE PROSTH N/A 3/8/2016    Procedure: INSERTION OF THREE PIECE PENILE PROSTHESIS;  Surgeon: John Gurrola MD;  [facility];  Service: Urology   • PROSTATE BIOPSY  01/06/2014       Social History     Socioeconomic History   • Marital status: Single     Spouse name: Not on file   • Number of children: Not on file   • Years of education: Not on file   • Highest education level: Not on file   Occupational History   • Not on file   Tobacco Use   • Smoking status: Every Day     Packs/day: 0 50     Years: 64 00     Pack years: 32 00     Types: Cigarettes   • Smokeless tobacco: Never   Vaping Use   • Vaping Use: Never used   Substance and Sexual Activity   • Alcohol use: Yes     Comment: 2 a day   • Drug use: No   • Sexual activity: Not Currently   Other Topics Concern   • Not on file   Social History Narrative   • Not on file     Social Determinants of Health     Financial Resource Strain: Low Risk    • Difficulty of Paying Living Expenses: Not hard at all   Food Insecurity: Not on file   Transportation Needs: No Transportation Needs   • Lack of Transportation (Medical): No   • Lack of Transportation (Non-Medical):  No   Physical Activity: Not on file   Stress: Not on file   Social Connections: Not on file   Intimate Partner Violence: Not on file   Housing Stability: Not on file       Family History   Problem Relation Age of Onset   • No Known Problems Mother    • No Known Problems Father

## 2023-03-06 ENCOUNTER — OFFICE VISIT (OUTPATIENT)
Dept: INTERNAL MEDICINE CLINIC | Facility: OTHER | Age: 78
End: 2023-03-06

## 2023-03-06 VITALS
BODY MASS INDEX: 19.1 KG/M2 | TEMPERATURE: 98.2 F | OXYGEN SATURATION: 97 % | WEIGHT: 126 LBS | HEIGHT: 68 IN | HEART RATE: 55 BPM | SYSTOLIC BLOOD PRESSURE: 130 MMHG | DIASTOLIC BLOOD PRESSURE: 72 MMHG

## 2023-03-06 DIAGNOSIS — Z87.891 FORMER SMOKER: ICD-10-CM

## 2023-03-06 DIAGNOSIS — M25.551 RIGHT HIP PAIN: ICD-10-CM

## 2023-03-06 DIAGNOSIS — K22.70 BARRETT'S ESOPHAGUS WITHOUT DYSPLASIA: Primary | ICD-10-CM

## 2023-03-06 DIAGNOSIS — Z12.2 SCREENING FOR LUNG CANCER: ICD-10-CM

## 2023-03-06 DIAGNOSIS — R63.4 WEIGHT LOSS: ICD-10-CM

## 2023-03-06 DIAGNOSIS — J43.1 PANLOBULAR EMPHYSEMA (HCC): ICD-10-CM

## 2023-03-06 RX ORDER — PANTOPRAZOLE SODIUM 40 MG/1
40 TABLET, DELAYED RELEASE ORAL DAILY
Qty: 90 TABLET | Refills: 1 | Status: SHIPPED | OUTPATIENT
Start: 2023-03-06

## 2023-03-06 RX ORDER — ALBUTEROL SULFATE 90 UG/1
2 AEROSOL, METERED RESPIRATORY (INHALATION) EVERY 6 HOURS PRN
Qty: 6.7 G | Refills: 5 | Status: SHIPPED | OUTPATIENT
Start: 2023-03-06

## 2023-03-06 NOTE — PROGRESS NOTES
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  - pantoprazole (PROTONIX) 40 mg tablet; Take 1 tablet (40 mg total) by mouth daily  Dispense: 90 tablet; Refill: 1  - Ambulatory Referral to Gastroenterology; Future  -Counseled on minimizing NSAID use as possible, currently use over-the-counter Aleve 1-2 times daily    2  Panlobular emphysema (HCC)  continue Trelegy and PRN Albuterol     3  Former smoker  Quit in 01/2023 encouraged to continue in abstinence and congratulated on the change especially with his COPD      4  Screening for lung cancer  Done March 2022 with no nodules   Due, ordered, number provided to call and schedule it     5  Right hip pain  Chronic , OA , had intraarticular steroid injection and recent Medrol pack with minimum relief  has upcoming appointment with Ortho , Dr Winslow Records 03/09/23     6   Weight loss  Patient noted it is mainly due to decreased appetite   Denies fever, chills or night sweats   No GI symptoms, but has hx of Smith's, see above #1 ; PPI restart and GI referral   Chronic stable GUSTAFSON in context of COPD hx, no new symptoms , will do CT lung screening        Orders Placed This Encounter   Procedures   • Ambulatory Referral to Gastroenterology       - Counseling Documentation: patient was counseled regarding: diagnostic results, instructions for management and risks and benefits of treatment options    Chief Complaint     Chief Complaint   Patient presents with   • Follow-up     Pt here for a 3 month f/u   3/9 appt with surgeon for R hip   Pt quit smoking first week of January        History of Present Illness     Braden Tran is a pleasant 76years old male with past medical history remarkable for Smith's esophagus, history of prostate adenocarcinoma, smoking history quit January 2023, COPD/panlobular emphysema, presents today for follow-up on chronic conditions, on review patient noted that he is not taking PPI anymore because of no symptoms however explained to the patient need for close GI follow-up and PPI given Smith's esophagus diagnosis and in addition now with the weight loss unexplained, agreeable, congratulated on quitting smoking, explained still need for CT lung screening as patient initially thought that he might not need it given that he quit smoking, explained guidelines and he verbalized understanding and agreement, and appointment with Ortho regarding his right hip pain, explained side effects of NSAIDs especially with his Smith's esophagus, and advised to minimize, as possible and use as needed Tylenol instead  HPI    The following portions of the patient's history were reviewed and updated as appropriate: allergies, current medications, past family history, past medical history, past social history, past surgical history and problem list     Review of Systems     Review of Systems  - GENERAL: positive for weight loss Negative for any nausea, vomiting, fevers, chills  - HEENT: Negative for any head/Neck trauma, pain, double/blurry vision, sinusitis, rhinitis, nose bleeding   - CARDIAC: Negative for any chest pain, palpitation, Dyspnea on exertion, peripheral edema  - PULMONARY: Negative for any SOB, cough, wheezing    - GASTROINTESTINAL: Negative for any abdominal pain, N/V/D/C, blood in stool    - GENITOURINARY: Negative for any dysuria, hematuria, incontinence   - NEUROLOGIC: Negative for any muscle weakness, numbness/tingling, memory changes  - MUSCULOSKELETAL: Right hip pain , chronic , causing limping when walking 1+ minute ; otherwise Negative for any joint pains/swelling, limited ROM  - INTEGUMENTARY: Negative for any rashes, cuts/ lesions   - HEMATOLOGIC: Negative for any abnormal bruising, frequent infections or bleeding      Active Problem List     Patient Active Problem List   Diagnosis   • Smith esophagus   • Chronic obstructive pulmonary disease (HCC)   • Erectile dysfunction   • Tobacco abuse   • Esophageal reflux   • Lung nodules   • Hiatal hernia   • Elevated prostate specific antigen (PSA)   • Prostate cancer ([facility] )   • Screening for lung cancer   • Right hip pain   • Osteoarthritis of one hip, right   • Moderate protein-calorie malnutrition ([facility] )   • Former smoker   • Weight loss       Objective     /72 (BP Location: Left arm, Patient Position: Sitting, Cuff Size: Standard)   Pulse 55   Temp 98 2 °F (36 8 °C) (Temporal)   Ht 5' 8" (1 727 m)   Wt 57 2 kg (126 lb)   SpO2 97% Comment: room air  BMI 19 16 kg/m²     Physical Exam  - GEN: Appears well, alert and oriented x 3, pleasant and cooperative, in no acute distress  - HEENT: Anicteric, mucous membranes moist, PERRL and EOMI   - NECK: No lymphadenopathy, JVD or carotid bruits   - HEART: RRR, normal S1 and S2, no murmurs, clicks, gallops or rubs   - LUNGS: faint/minimal bilateral end expiratory wheezing; no rales, or rhonchi  - ABDOMEN: Normal bowel sounds, soft, no tenderness, no distention, no organomegaly or masses felt on exam    - EXTREMITIES: Peripheral pulses normal; no clubbing, cyanosis, or edema  - NEURO: No focal findings, CN II-XII are grossly intact  - Musculoskeletal: 5/5 strength, normal ROM, no swollen or erythematous joints     - SKIN: Normal without suspicious lesions on exposed skin    Pertinent Laboratory/Diagnostic Studies:  CBC:   Lab Results   Component Value Date/Time    WBC 8 21 11/01/2022 10:56 AM    WBC 7 23 05/04/2015 09:32 AM    RBC 4 20 11/01/2022 10:56 AM    RBC 4 14 05/04/2015 09:32 AM    HGB 13 9 11/01/2022 10:56 AM    HGB 13 5 05/04/2015 09:32 AM    HCT 43 4 11/01/2022 10:56 AM    HCT 40 7 05/04/2015 09:32 AM     (H) 11/01/2022 10:56 AM    MCV 98 05/04/2015 09:32 AM    MCH 33 1 11/01/2022 10:56 AM    MCH 32 6 05/04/2015 09:32 AM    MCHC 32 0 11/01/2022 10:56 AM    MCHC 33 2 05/04/2015 09:32 AM    RDW 13 6 11/01/2022 10:56 AM    RDW 13 6 05/04/2015 09:32 AM    MPV 9 8 11/01/2022 10:56 AM    MPV 9 3 05/04/2015 09:32 AM     11/01/2022 10:56 AM     05/04/2015 09:32 AM    NRBC 0 11/01/2022 10:56 AM    NEUTOPHILPCT 75 11/01/2022 10:56 AM    NEUTOPHILPCT 77 (H) 05/04/2015 09:32 AM    LYMPHOPCT 12 (L) 11/01/2022 10:56 AM    LYMPHOPCT 11 (L) 05/04/2015 09:32 AM    MONOPCT 11 11/01/2022 10:56 AM    MONOPCT 10 05/04/2015 09:32 AM    EOSPCT 1 11/01/2022 10:56 AM    EOSPCT 2 05/04/2015 09:32 AM    BASOPCT 1 11/01/2022 10:56 AM    NEUTROABS 6 12 11/01/2022 10:56 AM    NEUTROABS 5 57 05/04/2015 09:32 AM    LYMPHSABS 0 99 11/01/2022 10:56 AM    LYMPHSABS 0 80 05/04/2015 09:32 AM    MONOSABS 0 91 11/01/2022 10:56 AM    MONOSABS 0 72 05/04/2015 09:32 AM    EOSABS 0 11 11/01/2022 10:56 AM    EOSABS 0 14 05/04/2015 09:32 AM     Chemistry Profile:   Lab Results   Component Value Date/Time    K 4 4 11/01/2022 10:56 AM    K 4 9 07/26/2021 10:07 AM     (H) 11/01/2022 10:56 AM     07/26/2021 10:07 AM    CO2 26 11/01/2022 10:56 AM    CO2 25 07/26/2021 10:07 AM    BUN 16 11/01/2022 10:56 AM    BUN 19 07/26/2021 10:07 AM    CREATININE 0 89 11/01/2022 10:56 AM    GLUC 93 07/26/2021 10:07 AM    GLUF 95 11/01/2022 10:56 AM    CALCIUM 8 9 11/01/2022 10:56 AM    CALCIUM 8 9 07/26/2021 10:07 AM    CORRECTEDCA 9 4 11/01/2022 10:56 AM    AST 21 11/01/2022 10:56 AM    AST 18 07/26/2021 10:07 AM    ALT 20 11/01/2022 10:56 AM    ALT 10 07/26/2021 10:07 AM    ALKPHOS 70 11/01/2022 10:56 AM    ALKPHOS 69 07/26/2021 10:07 AM    EGFR 81 11/01/2022 10:56 AM     Endocrine Studies:   Lab Results   Component Value Date/Time    DUC7JLZPLIFA 4 060 (H) 02/05/2021 10:40 AM    FREET4 0 97 02/05/2021 10:40 AM    TRIG 41 11/01/2022 10:56 AM    TRIG 51 07/15/2020 09:47 AM    CHOLESTEROL 157 11/01/2022 10:56 AM    CHOLESTEROL 179 07/15/2020 09:47 AM    HDL 78 11/01/2022 10:56 AM    HDL 71 07/15/2020 09:47 AM    LDLCALC 71 11/01/2022 10:56 AM    LDLCALC 95 07/15/2020 09:47 AM    NONHDLC 79 11/01/2022 10:56 AM     Iron Studies: No results found for: LABIRON, IRON, TIBC, FERRITIN  Health Maintenance:   Lab Results   Component Value Date/Time    PSA 0 3 07/26/2021 10:07 AM    PSA 0 4 12/13/2018 09:43 AM    PSA 0 5 12/12/2017 05:12 PM    PSA 1 5 06/22/2015 01:57 PM    HEPCAB NON-REACTIVE 07/26/2021 10:07 AM         Current Medications     Current Outpatient Medications:   •  fluticasone-umeclidinium-vilanterol (Trelegy Ellipta) 100-62 5-25 mcg/actuation inhaler, Inhale 1 puff daily Rinse mouth after use , Disp: 180 blister, Rfl: 1  •  pantoprazole (PROTONIX) 40 mg tablet, Take 1 tablet (40 mg total) by mouth daily, Disp: 90 tablet, Rfl: 1  •  methylprednisolone (MEDROL) 4 mg tablet, Medrol dose pack, take as directed (Patient not taking: Reported on 3/6/2023), Disp: 21 tablet, Rfl: 0    Health Maintenance     Health Maintenance   Topic Date Due   • PT PLAN OF CARE  Never done   • SLP PLAN OF CARE  Never done   • COVID-19 Vaccine (1) Never done   • Annual Physical  Never done   • DTaP,Tdap,and Td Vaccines (1 - Tdap) 04/02/2008   • Lung Cancer Screening  03/02/2023   • Fall Risk  11/15/2023   • Depression Screening  11/15/2023   • BMI: Adult  03/06/2024   • Hepatitis C Screening  Completed   • Pneumococcal Vaccine: 65+ Years  Completed   • Influenza Vaccine  Completed   • HIB Vaccine  Aged Out   • IPV Vaccine  Aged Out   • Hepatitis A Vaccine  Aged Out   • Meningococcal ACWY Vaccine  Aged Out   • HPV Vaccine  Aged Out     Immunization History   Administered Date(s) Administered   • INFLUENZA 11/15/2022   • Influenza, high dose seasonal 0 7 mL 11/15/2022   • Influenza, seasonal, injectable 10/10/2011   • Pneumococcal Conjugate 13-Valent 05/30/2017   • Pneumococcal Polysaccharide PPV23 10/10/2011   • Td (adult), adsorbed 04/01/2008       Minor Joshua DO   Internal Medicine - PGY3  [facility]    3/6/2023 10:57 AM

## 2023-03-09 ENCOUNTER — OFFICE VISIT (OUTPATIENT)
Dept: OBGYN CLINIC | Facility: MEDICAL CENTER | Age: 78
End: 2023-03-09

## 2023-03-09 VITALS
SYSTOLIC BLOOD PRESSURE: 146 MMHG | HEART RATE: 62 BPM | BODY MASS INDEX: 19.25 KG/M2 | HEIGHT: 68 IN | DIASTOLIC BLOOD PRESSURE: 64 MMHG | WEIGHT: 127 LBS

## 2023-03-09 DIAGNOSIS — M16.11 PRIMARY OSTEOARTHRITIS OF ONE HIP, RIGHT: ICD-10-CM

## 2023-03-09 DIAGNOSIS — M25.551 CHRONIC PAIN OF RIGHT HIP: ICD-10-CM

## 2023-03-09 DIAGNOSIS — G89.29 CHRONIC PAIN OF RIGHT HIP: ICD-10-CM

## 2023-03-09 RX ORDER — SODIUM CHLORIDE, SODIUM LACTATE, POTASSIUM CHLORIDE, CALCIUM CHLORIDE 600; 310; 30; 20 MG/100ML; MG/100ML; MG/100ML; MG/100ML
125 INJECTION, SOLUTION INTRAVENOUS CONTINUOUS
OUTPATIENT
Start: 2023-03-09

## 2023-03-09 RX ORDER — CEFAZOLIN SODIUM 2 G/50ML
2000 SOLUTION INTRAVENOUS ONCE
OUTPATIENT
Start: 2023-03-09 | End: 2023-03-09

## 2023-03-09 RX ORDER — ASCORBIC ACID 500 MG
500 TABLET ORAL 2 TIMES DAILY
Qty: 30 TABLET | Refills: 1 | Status: SHIPPED | OUTPATIENT
Start: 2023-03-09

## 2023-03-09 RX ORDER — MULTIVIT-MIN/IRON FUM/FOLIC AC 7.5 MG-4
1 TABLET ORAL DAILY
Qty: 30 TABLET | Refills: 1 | Status: SHIPPED | OUTPATIENT
Start: 2023-03-09

## 2023-03-09 RX ORDER — FOLIC ACID 1 MG/1
1 TABLET ORAL DAILY
Qty: 30 TABLET | Refills: 1 | Status: SHIPPED | OUTPATIENT
Start: 2023-03-09

## 2023-03-09 RX ORDER — MELATONIN
2000 DAILY
Qty: 60 TABLET | Refills: 1 | Status: SHIPPED | OUTPATIENT
Start: 2023-03-09

## 2023-03-09 RX ORDER — CHLORHEXIDINE GLUCONATE 0.12 MG/ML
15 RINSE ORAL ONCE
OUTPATIENT
Start: 2023-03-09 | End: 2023-03-09

## 2023-03-09 RX ORDER — CHLORHEXIDINE GLUCONATE 4 G/100ML
SOLUTION TOPICAL DAILY PRN
OUTPATIENT
Start: 2023-03-09

## 2023-03-09 RX ORDER — ACETAMINOPHEN 325 MG/1
975 TABLET ORAL ONCE
OUTPATIENT
Start: 2023-03-09 | End: 2023-03-09

## 2023-03-09 NOTE — PROGRESS NOTES
Hip New Office Note    Assessment:     1  Primary osteoarthritis of one hip, right    2  Chronic pain of right hip        Plan:     Problem List Items Addressed This Visit    None  Visit Diagnoses     Primary osteoarthritis of one hip, right        Chronic pain of right hip              Findings today are consistent with Right hip OA  Imaging and prognosis was reviewed with the patient today showing that he has severe, bone on bone hip OA as well as arthritic changes of the lumbar spine  On physical exam he has significantly limited ROM of the right hip with pain  Discussed conservative treatment options to include cortisone injections, oral NSAIDs, Tylenol, activity modifications, and staying active with low impact exercises  He has tried and failed conservative treatment options including cortisone injection, Medrol Dosepak, Tylenol, contact activities, activity modifications  Surgical treatment option of right total hip replacement was reviewed at length today with the patient  Surgical procedure as well as recovery were discussed  The patient has elected to proceed with right MANDO through the anterior approach  Risks and benefits of surgery to include but not limited to bleeding, infection, damage to surrounding structures, hardware failure, instability, fracture, dislocation, leg length inequality, need for further surgery, continued pain, stiffness, blood clots, stroke, heart attack, and LFCN numbness was discussed with the patient  Informed consent was signed today in the office  The patient has met with our surgical schedulers and our preoperative joint replacement pathway has been initiated  All questions were answered  Patient will follow-up 2 weeks post operatively  Patient is a nonsmoker and appropriate BMI of 19 31  Subjective:     Patient ID: Mark Austin is a 76 y o  male  Chief Complaint:  HPI:  The patient presents with a chief complaint of right hip pain   He was referred by   C[facility]  He has known OA of the right hip which he has been treating conservatively  He has been having pain in the groin for several years, which has been progressively worsening and starting to affect his ADLs  His pain is localized to the groin and does not radiate down the leg  He denies numbness and tingling  He tried an Right hip IA cortisone injection on 11/25/22 with very short term relief which wore off after only a few hours  Recently he tried a medrol dose pack which only helped in the short term that he was on the medication  He tries Tylenol for pain without significant improvement  He was told by his PCP that he should not take NSAIDs  He is starting to have difficulty with his socks and shoes and getting in and out of the car  He is active and likes to work his [employer]        Allergy:  Allergies   Allergen Reactions   • Penicillins Hives and Other (See Comments)     Other reaction(s): facial swelling  Reaction Unknown   • Meloxicam Rash     Medications:  all current active meds have been reviewed  Past Medical History:  Past Medical History:   Diagnosis Date   • COPD (chronic obstructive pulmonary disease) (HCC)    • Esophageal reflux    • Inguinal hernia    • Rectal adenocarcinoma ([address] )    • Spermatocele      Past Surgical History:  Past Surgical History:   Procedure Laterality Date   • CT NEEDLE BIOPSY LUNG  7/9/2019   • FL INJECTION RIGHT HIP (NON ARTHROGRAM)  11/25/2022   • HERNIA REPAIR     • INSERTION PROSTATE RADIATION SEED     • CT INSJ MULTI-COMPONENT INFLATABLE PENILE PROSTH N/A 3/8/2016    Procedure: INSERTION OF THREE PIECE PENILE PROSTHESIS;  Surgeon: Sanjay Hartley MD;  Location: [facility];  Service: Urology   • PROSTATE BIOPSY  01/06/2014     Family History:  Family History   Problem Relation Age of Onset   • No Known Problems Mother    • No Known Problems Father      Social History:  Social History     Substance and Sexual Activity   Alcohol Use Yes    Comment: 2 a day     Social History     Substance and Sexual Activity   Drug Use No     Social History     Tobacco Use   Smoking Status Former   • Packs/day: 0 50   • Years: 68 00   • Pack years: 34 00   • Types: Cigarettes   • Start date: 5   • Quit date: 2023   • Years since quittin 1   Smokeless Tobacco Never           ROS:  General: Per HPI  Skin: Negative, except if noted below  HEENT: Negative  Respiratory: Negative  Cardiovascular: Negative  Gastrointestinal: Negative  Urinary: Negative  Vascular: Negative  Musculoskeletal: Positive per HPI   Neurologic: Positive per HPI  Endocrine: Negative    Objective:  BP Readings from Last 1 Encounters:   23 146/64      Wt Readings from Last 1 Encounters:   23 57 6 kg (127 lb)        Respiratory:   non-labored respirations    Lymphatics:  no palpable lymph nodes    Gait and Station:   antalgic    Neurologic:   Alert and oriented times 3  Patient with normal sensation except as noted below  Deep tendon reflexes 2+ except as noted in MSK exam    Bilateral Lower Extremity:  Right Hip     Inspection: skin intact    Range of Motion: significantly limited with pain    + log roll    - SLR    Motor: 5/5 IP/Q/HS/TA/GS    Pulses: 2+ DP / 2+ PT    SILT DP/SP/S/S/TN    Left Hip   Full ROM without pain, -log roll    Imaging:  My interpretation XR AP pelvis/ Right hip: severe joint space narrowing, bone on bone, subchondral sclerosis, subchondral cysts, osteophyte formation  No fracture or dislocation  BMI:   Estimated body mass index is 19 31 kg/m² as calculated from the following:    Height as of this encounter: 5' 8" (1 727 m)  Weight as of this encounter: 57 6 kg (127 lb)  BSA:   Estimated body surface area is 1 69 meters squared as calculated from the following:    Height as of this encounter: 5' 8" (1 727 m)  Weight as of this encounter: 57 6 kg (127 lb)             Scribe Attestation    I,:  Damián Acosta PA-C am acting as a scribe while in the presence of the attending physician :       I,:  Christa Garcia,  personally performed the services described in this documentation    as scribed in my presence :

## 2023-03-10 ENCOUNTER — TELEPHONE (OUTPATIENT)
Dept: OBGYN CLINIC | Facility: HOSPITAL | Age: 78
End: 2023-03-10

## 2023-03-10 ENCOUNTER — APPOINTMENT (OUTPATIENT)
Dept: LAB | Facility: IMAGING CENTER | Age: 78
End: 2023-03-10

## 2023-03-10 DIAGNOSIS — M16.11 PRIMARY OSTEOARTHRITIS OF ONE HIP, RIGHT: Primary | ICD-10-CM

## 2023-03-10 DIAGNOSIS — M16.11 PRIMARY OSTEOARTHRITIS OF ONE HIP, RIGHT: ICD-10-CM

## 2023-03-10 DIAGNOSIS — Z01.812 PRE-PROCEDURE LAB EXAM: ICD-10-CM

## 2023-03-10 LAB
ALBUMIN SERPL BCP-MCNC: 3.6 G/DL (ref 3.5–5)
ALP SERPL-CCNC: 71 U/L (ref 46–116)
ALT SERPL W P-5'-P-CCNC: 20 U/L (ref 12–78)
ANION GAP SERPL CALCULATED.3IONS-SCNC: 7 MMOL/L (ref 4–13)
APTT PPP: 29 SECONDS (ref 23–37)
AST SERPL W P-5'-P-CCNC: 22 U/L (ref 5–45)
BASOPHILS # BLD AUTO: 0.03 THOUSANDS/ÂΜL (ref 0–0.1)
BASOPHILS NFR BLD AUTO: 0 % (ref 0–1)
BILIRUB SERPL-MCNC: 0.62 MG/DL (ref 0.2–1)
BUN SERPL-MCNC: 17 MG/DL (ref 5–25)
CALCIUM SERPL-MCNC: 8.9 MG/DL (ref 8.3–10.1)
CHLORIDE SERPL-SCNC: 109 MMOL/L (ref 96–108)
CO2 SERPL-SCNC: 25 MMOL/L (ref 21–32)
CREAT SERPL-MCNC: 0.76 MG/DL (ref 0.6–1.3)
EOSINOPHIL # BLD AUTO: 0.09 THOUSAND/ÂΜL (ref 0–0.61)
EOSINOPHIL NFR BLD AUTO: 1 % (ref 0–6)
ERYTHROCYTE [DISTWIDTH] IN BLOOD BY AUTOMATED COUNT: 13.7 % (ref 11.6–15.1)
GFR SERPL CREATININE-BSD FRML MDRD: 86 ML/MIN/1.73SQ M
GLUCOSE SERPL-MCNC: 73 MG/DL (ref 65–140)
HCT VFR BLD AUTO: 44.3 % (ref 36.5–49.3)
HGB BLD-MCNC: 14.3 G/DL (ref 12–17)
IMM GRANULOCYTES # BLD AUTO: 0.05 THOUSAND/UL (ref 0–0.2)
IMM GRANULOCYTES NFR BLD AUTO: 1 % (ref 0–2)
INR PPP: 0.97 (ref 0.84–1.19)
LYMPHOCYTES # BLD AUTO: 1.03 THOUSANDS/ÂΜL (ref 0.6–4.47)
LYMPHOCYTES NFR BLD AUTO: 10 % (ref 14–44)
MCH RBC QN AUTO: 33 PG (ref 26.8–34.3)
MCHC RBC AUTO-ENTMCNC: 32.3 G/DL (ref 31.4–37.4)
MCV RBC AUTO: 102 FL (ref 82–98)
MONOCYTES # BLD AUTO: 0.95 THOUSAND/ÂΜL (ref 0.17–1.22)
MONOCYTES NFR BLD AUTO: 9 % (ref 4–12)
NEUTROPHILS # BLD AUTO: 8.71 THOUSANDS/ÂΜL (ref 1.85–7.62)
NEUTS SEG NFR BLD AUTO: 79 % (ref 43–75)
NRBC BLD AUTO-RTO: 0 /100 WBCS
PLATELET # BLD AUTO: 255 THOUSANDS/UL (ref 149–390)
PMV BLD AUTO: 9.9 FL (ref 8.9–12.7)
POTASSIUM SERPL-SCNC: 4.6 MMOL/L (ref 3.5–5.3)
PROT SERPL-MCNC: 7.1 G/DL (ref 6.4–8.4)
PROTHROMBIN TIME: 13.1 SECONDS (ref 11.6–14.5)
RBC # BLD AUTO: 4.33 MILLION/UL (ref 3.88–5.62)
SODIUM SERPL-SCNC: 141 MMOL/L (ref 135–147)
WBC # BLD AUTO: 10.86 THOUSAND/UL (ref 4.31–10.16)

## 2023-03-10 NOTE — TELEPHONE ENCOUNTER
Preoperative Elective Admission Assessment    Living Situation:    Who does pt live with: lives alone  What kind of home: multi-level, stays on first floor  How do they enter the home: front  How many levels in home: 2, stays on first floor   # of steps to enter home: 1  # of steps to second floor: n/a  Are there handrails: No  Are there landings: No  Sleeping arrangement: first/entry floor  Where is Bathroom: entry level  Where is the tub or shower: entry level shower without grab bars or shower chair  Dogs or ther pets: n/a    First Floor Setup:   Is there a bathroom: Yes  Where would pt sleep: couch, bed and recliner     DME: n/a     Patient's Current Level of Function: Ambulates: Independently and ADLs: Independent    Post-op Caregiver: unknown  Caregiver Name and phone number for Inpatient discharge needs: n/a  Currently receive any HHC/aides/community supports: No     Post-op Transport: relative  To/from hospital: relative  To/from PT 2-3x/week: n/a  Uses community transport now: No     Outpatient Physical Therapy Site:  Site: Not ordered  pre and post-op appts scheduled? No     Medication Management: self and out of bottle  Preferred Pharmacy for Post-op Medications: [facility]  Blood Management Vitamin Regimen: Pt confirms taking as prescribed  Post-op anticoagulant: to be determined by surgical team postoperatively     DC Plan: Pt plans to be discharged home      Barriers to DC identified preoperatively: caregiver support    BMI: 19 31    Not Enrolled in [employer]    Patient Education:  Pt educated on post-op pain, early mobilization (POD0), Average inpt LOS, OP PT goal   Patient educated that our goal is to appropriately discharge patient based off their post-op function while striving to maintain maximal independence  The goal is to discharge patient to home and for them to attend outpatient physical therapy  Assigned to care team? Yes    SW referral: Placed   Patient lacks post-op caregiver support even though he is determined to be discharged home

## 2023-03-11 LAB
EST. AVERAGE GLUCOSE BLD GHB EST-MCNC: 108 MG/DL
HBA1C MFR BLD: 5.4 %

## 2023-03-13 ENCOUNTER — CLINICAL SUPPORT (OUTPATIENT)
Dept: INTERNAL MEDICINE CLINIC | Facility: OTHER | Age: 78
End: 2023-03-13

## 2023-03-13 ENCOUNTER — APPOINTMENT (OUTPATIENT)
Dept: LAB | Facility: IMAGING CENTER | Age: 78
End: 2023-03-13

## 2023-03-13 DIAGNOSIS — Z13.6 SCREENING FOR ISCHEMIC HEART DISEASE: ICD-10-CM

## 2023-03-13 DIAGNOSIS — M16.11 PRIMARY OSTEOARTHRITIS OF ONE HIP, RIGHT: ICD-10-CM

## 2023-03-13 DIAGNOSIS — Z01.818 PRE-OP TESTING: Primary | ICD-10-CM

## 2023-03-13 DIAGNOSIS — Z01.812 PRE-PROCEDURE LAB EXAM: ICD-10-CM

## 2023-03-13 LAB
ABO GROUP BLD: NORMAL
ABO GROUP BLD: NORMAL
BLD GP AB SCN SERPL QL: NEGATIVE
BLD GP AB SCN SERPL QL: NEGATIVE
RH BLD: POSITIVE
RH BLD: POSITIVE
SPECIMEN EXPIRATION DATE: NORMAL
SPECIMEN EXPIRATION DATE: NORMAL

## 2023-03-13 NOTE — H&P (VIEW-ONLY)
Internal Medicine Pre-Operative Evaluation:     Reason for Visit: Pre-operative Evaluation for Risk Stratification and Optimization    Patient ID: Gonzales Serra is a 76 y o  male  Surgery: Arthroplasty of right Hip  Referring Provider: Dr Kathya Huerta      Recommendations to Proceed withSurgery    Patient is considered to be Medium risk for Medium risk procedure  Due to underlying emphysema  After evaluation and discussion with patient with emphasis that all surgery has some degree of inherent risk it is determined this procedure is of acceptable risk  medically  Patient may proceed with planned procedure      Assessment      Primary osteoarthritis right Hip  • Failed conservative measures  • Electing to undergo total joint arthroplasty    Pre-operative Medical Evaluation for planned surgery  • Patient meets preoperative quality goals as noted below  • Recommendations as listed in PLAN section below  • Contact surgical nurse  navigator with any questions regarding preoperative plan or schedule      Panlobular emphysema  · Continue with the use of current metered-dose inhalers  · Monitor postoperative oxygen saturation  · Strongly encouraged use of incentive spirometry both pre and postsurgical    GERD  · Continue with use of PPI  · Known history of Smith's esophagus    History of elevated PSA/history of prostate cancer treated with radiation  · Monitored by primary care physician and urology  · Monitor for any post operative urinary retention issues    COPD  · Cont inhalers as prescribed  · Monitor respiratory sx post op  · Encourage pre and postoperative use of incentive spirometer  · ARISCAT SCORE 3 - RISK LOW        Patient Active Problem List   Diagnosis   • Smith esophagus   • Chronic obstructive pulmonary disease (HCC)   • Erectile dysfunction   • Tobacco abuse   • Esophageal reflux   • Lung nodules   • Hiatal hernia   • Elevated prostate specific antigen (PSA)   • Prostate cancer ([facility] )   • Screening for lung cancer   • Right hip pain   • Osteoarthritis of one hip, right   • Moderate protein-calorie malnutrition ([address] )   • Former smoker   • Weight loss   • Primary osteoarthritis of one hip, right        Plan:     1  Further preoperative workup as follows:   - none no further testing required may proceed with surgery    2  Medication Management/Recommendations:   - continue all current medicines through morning of surgery with sip of water except the following:  - hold aspirin 7 days prior to surgery  - avoid use of NSAID such as motrin, advil, aleve for 7 days prior to surgery  - hold all OTC herbal or nutritional supplements 7 days before surgery    3  Patient requires further consultation with:   No Consults Required    4  Discharge Planning / Barriers to Discharge  none identified - patients has post discharge therapy plan in place, transportation arranged for discharge day, adequate family support at home to assist with discharge to home  Subjective:           History of Present Illness:     Elsy Corley is a 76 y o  male who presents to the office today for a preoperative consultation at the request of surgeon  The patient understands this is an elective procedure and not emergent  They are electing to undergo planned procedure with an understanding that all surgery has inherent risk  They have worked with their surgeon and failed conservative treatment measures  Today they present for preoperative risk assessment and recommendations for optimization in preparation for surgery  Pt seen in surgical optimization center for upcoming proposed surgery  They have failed previous conservative measures and have elected surgical intervention  Pt meets presurgical lab and BMI optimization goals, except some mild leukocytosis    Upon interview questioning patient is able to perform greater than 4 METs workload in daily life without any reported cardio-pulmonary symptoms    The patient's "biggest damian leading into surgery is out of his underlying moderate to moderately severe chronic obstructive pulmonary disease  He did quit smoking approximately 4 months ago but has a several decade history of tobacco use  Upon questioning and as mentioned above he is able to effectively perform 4 metabolic equivalents of workload by being able to ascend a flight of stairs and/or walk greater than 2 blocks on flat ground without stopping  He does state that he is winded after this however he does not experience any chest pain or the necessity to cease activity during this level of exertion  I strongly encouraged him to use preoperatively his incentive spirometer to try to help maximize lung capacity and strength as well as continue to use his inhalers  From a social standpoint the patient does live alone  He states that he does have the assistance of his sister who is also elderly but will be able to transport him to physical therapy sessions until he has clearance to drive  I do understand from his report that he is also working with our case management department for the possibility of obtaining some in-home assistance through visiting nurses  ROS: No TIA's or unusual headaches, no dysphagia  No prolonged cough  No dyspnea or chest pain on exertion  No abdominal pain, change in bowel habits, black or bloody stools  No urinary tract or BPH symptoms  Positive reported pain in arthritic joint  Positive difficulty with gait  No skin rashes or issues              Objective:      /68   Pulse 62   Ht 5' 8\" (1 727 m) Comment: Verbal  Wt 57 2 kg (126 lb)   SpO2 96%   BMI 19 16 kg/m²       General Appearance: no distress, conversive  HEENT: PERRLA, conjuctiva normal; oropharynx clear; mucous membranes moist;   Neck:  Supple, no lymphadenopathy or thyromegaly  Lungs: breath sounds generally faint, normal respiratory effort, no retractions, expiratory phase prolonged  CV: normal heart sounds " S1/S2, PMI normal   ABD: soft non tender, no masses , no hepatic or splenomegaly  EXT: DP pulses intact, no lymphadenopathy, no edema  Skin: normal turgor, normal texture, no rash  Psych: affect normal, mood normal  Neuro: AAOx3        The following portions of the patient's history were reviewed and updated as appropriate: allergies, current medications, past family history, past medical history, past social history, past surgical history and problem list      Past History:       Past Medical History:   Diagnosis Date   • COPD (chronic obstructive pulmonary disease) ([facility] )    • Esophageal reflux    • Inguinal hernia    • Rectal adenocarcinoma ([facility] )    • Spermatocele     Past Surgical History:   Procedure Laterality Date   • CT NEEDLE BIOPSY LUNG  2019   • FL INJECTION RIGHT HIP (NON ARTHROGRAM)  2022   • HERNIA REPAIR     • INSERTION PROSTATE RADIATION SEED     • [place] INSJ MULTI-COMPONENT INFLATABLE PENILE [address] N/A 3/8/2016    Procedure: INSERTION OF THREE PIECE PENILE PROSTHESIS;  Surgeon: Suha Vergara MD;  [facility]: [facility];  Service: Urology   • PROSTATE BIOPSY  2014          Social History     Tobacco Use   • Smoking status: Former     Packs/day: 0 50     Years: 68 00     Pack years: 34 00     Types: Cigarettes     Start date: 5     Quit date: 2023     Years since quittin 1   • Smokeless tobacco: Never   Vaping Use   • Vaping Use: Never used   Substance Use Topics   • Alcohol use: Yes     Comment: 2 a day   • Drug use: No     Family History   Problem Relation Age of Onset   • No Known Problems Mother    • No Known Problems Father           Allergies:      Allergies   Allergen Reactions   • Penicillins Hives and Other (See Comments)     Other reaction(s): facial swelling  Reaction Unknown   • Meloxicam Rash        Current Medications:     Current Outpatient Medications   Medication Instructions   • albuterol (Proventil HFA) 90 mcg/act inhaler 2 puffs, Inhalation, Every 6 hours PRN "  • ascorbic acid (VITAMIN C) 500 mg, Oral, 2 times daily   • cholecalciferol (VITAMIN D3) 2,000 Units, Oral, Daily   • fluticasone-umeclidinium-vilanterol (Trelegy Ellipta) 100-62 5-25 mcg/actuation inhaler 1 puff, Inhalation, Daily, Rinse mouth after use  • folic acid (FOLVITE) 1 mg, Oral, Daily   • Multiple Vitamins-Minerals (multivitamin with minerals) tablet 1 tablet, Oral, Daily   • pantoprazole (PROTONIX) 40 mg, Oral, Daily             PRE-OP WORKSHEET DATA    Assessment of Pre-Operative Risks     MLJ Quality Hard Stops:  BMI (<40) : Estimated body mass index is 19 16 kg/m² as calculated from the following:    Height as of this encounter: 5' 8\" (1 727 m)  Weight as of this encounter: 57 2 kg (126 lb)  Hgb ( >11): Lab Results   Component Value Date    HGB 14 3 03/10/2023     HbA1c (<7 0) :   Lab Results   Component Value Date    HGBA1C 5 4 03/11/2023     GFR (>60) (Less then 45 = Nephrology consult):  Estimated Creatinine Clearance: 63 8 mL/min (by C-G formula based on SCr of 0 76 mg/dL)  Active Decompensated Chronic Conditions which would delay surgery  No acutely decompensated medical issues such as recent CVA, MI, new onset arrhythmia, severe aortic stenosis, CHF, uncontrolled COPD       Exercise Capacity: (if less the 4 mets consider functional assessment via cardiac stress testing or consultation)    · Able to walk 2 blocks without symptoms?: Yes  · Able to walk 1 flights without symptoms?: Yes    Assessment of intra and post operative respiratory, hemodynamic and thrombotic risks     Prior Anesthesia Reactions: No     Personal history of venous thromboembolic disease? No    History of steroid use > 5 mg for >2 weeks within last year?  No    Cardiac Risk Estimation: per the Revised Cardiac Risk Index (Circ  100:1043, 1999),     The patient's risk factors for cardiac complications include :  none    Nicholas Hunter has a in hospital cardiac risk of RCI RISK CLASS I (0 risk factors, risk of " major cardiac compl  appr  0 5%) based on RCRI calculator          Pre-Op Data Reviewed:       Laboratory Results: I have personally reviewed the pertinent laboratory results/reports     EKG:I have personally reviewed pertinent reports    I personally reviewed and interpreted available tracings in the electronic medical record  EKG was done in the family practice office I did request and reviewed a copy of this tracing and there is no suggestive ischemia or other concerning findings on this EKG    OLD RECORDS: reviewed old records in the chart review section if EHR on day of visit      Previous cardiopulmonary studies within the past year:  · Echocardiogram: yes, 2016 EF 60%  · Cardiac Catheterization: no  · Stress Test: no      Time of visit including pre-visit chart review, visit and post-visit coordination of plan and care , review of pre-surgical lab work, preparation and time spent documenting note in electronic medical record, time spent face-to-face in physical examination answering patient questions by care team 45 minutes             [address] [place] [address]

## 2023-03-13 NOTE — PATIENT INSTRUCTIONS
Contact surgical nurse  navigator with any questions regarding preoperative plan or schedule    Stop all over the counter supplements, herbal, naturopathic  medications for 1 week prior to surgery UNLESS prescribed by your surgeon  Hold NSAIDS (i e  advil, alleve, motrin, ibuprofen, celebrex) minimum 7 days prior to surgery  Hold Asprin minimum 7 days prior to surgery  Recommend using Tylenol ( acetaminophen ) 500mg every eight hours during the first week post discharge in conjunction with any additional pain medicine prescribed by your surgeon  Use bowel medicines prescribed by your surgeon ( colace) daily post op during the first 1-2 weeks to avoid post operative constipation issues  Call [phone] with any post discharge concerns or medical issues  The morning of surgery take only the following medication: inhalers

## 2023-03-13 NOTE — PROGRESS NOTES
Internal Medicine Pre-Operative Evaluation:     Reason for Visit: Pre-operative Evaluation for Risk Stratification and Optimization    Patient ID: Duke Franco is a 76 y o  male  Surgery: Arthroplasty of right Hip  Referring Provider: Dr Brian Wadsworth      Recommendations to Proceed withSurgery    Patient is considered to be Medium risk for Medium risk procedure  Due to underlying emphysema  After evaluation and discussion with patient with emphasis that all surgery has some degree of inherent risk it is determined this procedure is of acceptable risk  medically  Patient may proceed with planned procedure      Assessment      Primary osteoarthritis right Hip  • Failed conservative measures  • Electing to undergo total joint arthroplasty    Pre-operative Medical Evaluation for planned surgery  • Patient meets preoperative quality goals as noted below  • Recommendations as listed in PLAN section below  • Contact surgical nurse  navigator with any questions regarding preoperative plan or schedule      Panlobular emphysema  · Continue with the use of current metered-dose inhalers  · Monitor postoperative oxygen saturation  · Strongly encouraged use of incentive spirometry both pre and postsurgical    GERD  · Continue with use of PPI  · Known history of Smith's esophagus    History of elevated PSA/history of prostate cancer treated with radiation  · Monitored by primary care physician and urology  · Monitor for any post operative urinary retention issues    COPD  · Cont inhalers as prescribed  · Monitor respiratory sx post op  · Encourage pre and postoperative use of incentive spirometer  · ARISCAT SCORE 3 - RISK LOW        Patient Active Problem List   Diagnosis   • Smith esophagus   • Chronic obstructive pulmonary disease (HCC)   • Erectile dysfunction   • Tobacco abuse   • Esophageal reflux   • Lung nodules   • Hiatal hernia   • Elevated prostate specific antigen (PSA)   • Prostate cancer ([facility] )   • Screening for lung cancer   • Right hip pain   • Osteoarthritis of one hip, right   • Moderate protein-calorie malnutrition ([address] )   • Former smoker   • Weight loss   • Primary osteoarthritis of one hip, right        Plan:     1  Further preoperative workup as follows:   - none no further testing required may proceed with surgery    2  Medication Management/Recommendations:   - continue all current medicines through morning of surgery with sip of water except the following:  - hold aspirin 7 days prior to surgery  - avoid use of NSAID such as motrin, advil, aleve for 7 days prior to surgery  - hold all OTC herbal or nutritional supplements 7 days before surgery    3  Patient requires further consultation with:   No Consults Required    4  Discharge Planning / Barriers to Discharge  none identified - patients has post discharge therapy plan in place, transportation arranged for discharge day, adequate family support at home to assist with discharge to home  Subjective:           History of Present Illness:     Mary Puckett is a 76 y o  male who presents to the office today for a preoperative consultation at the request of surgeon  The patient understands this is an elective procedure and not emergent  They are electing to undergo planned procedure with an understanding that all surgery has inherent risk  They have worked with their surgeon and failed conservative treatment measures  Today they present for preoperative risk assessment and recommendations for optimization in preparation for surgery  Pt seen in surgical optimization center for upcoming proposed surgery  They have failed previous conservative measures and have elected surgical intervention  Pt meets presurgical lab and BMI optimization goals, except some mild leukocytosis    Upon interview questioning patient is able to perform greater than 4 METs workload in daily life without any reported cardio-pulmonary symptoms    The patient's biggest damian leading into surgery is out of his underlying moderate to moderately severe chronic obstructive pulmonary disease  He did quit smoking approximately 4 months ago but has a several decade history of tobacco use  Upon questioning and as mentioned above he is able to effectively perform 4 metabolic equivalents of workload by being able to ascend a flight of stairs and/or walk greater than 2 blocks on flat ground without stopping  He does state that he is winded after this however he does not experience any chest pain or the necessity to cease activity during this level of exertion  I strongly encouraged him to use preoperatively his incentive spirometer to try to help maximize lung capacity and strength as well as continue to use his inhalers  From a social standpoint the patient does live alone  He states that he does have the assistance of his sister who is also elderly but will be able to transport him to physical therapy sessions until he has clearance to drive  I do understand from his report that he is also working with our case management department for the possibility of obtaining some in-home assistance through visiting nurses  ROS: No TIA's or unusual headaches, no dysphagia  No prolonged cough  No dyspnea or chest pain on exertion  No abdominal pain, change in bowel habits, black or bloody stools  No urinary tract or BPH symptoms  Positive reported pain in arthritic joint  Positive difficulty with gait  No skin rashes or issues              Objective:      /68   Pulse 62   Ht 5' 8" (1 727 m) Comment: Verbal  Wt 57 2 kg (126 lb)   SpO2 96%   BMI 19 16 kg/m²       General Appearance: no distress, conversive  HEENT: PERRLA, conjuctiva normal; oropharynx clear; mucous membranes moist;   Neck:  Supple, no lymphadenopathy or thyromegaly  Lungs: breath sounds generally faint, normal respiratory effort, no retractions, expiratory phase prolonged  CV: normal heart sounds S1/S2, PMI normal   ABD: soft non tender, no masses , no hepatic or splenomegaly  EXT: DP pulses intact, no lymphadenopathy, no edema  Skin: normal turgor, normal texture, no rash  Psych: affect normal, mood normal  Neuro: AAOx3        The following portions of the patient's history were reviewed and updated as appropriate: allergies, current medications, past family history, past medical history, past social history, past surgical history and problem list      Past History:       Past Medical History:   Diagnosis Date   • COPD (chronic obstructive pulmonary disease) ([facility] )    • Esophageal reflux    • Inguinal hernia    • Rectal adenocarcinoma ([facility] )    • Spermatocele     Past Surgical History:   Procedure Laterality Date   • CT NEEDLE BIOPSY LUNG  2019   • FL INJECTION RIGHT HIP (NON ARTHROGRAM)  2022   • HERNIA REPAIR     • INSERTION PROSTATE RADIATION SEED     • [place] INSJ MULTI-COMPONENT INFLATABLE PENILE [address] N/A 3/8/2016    Procedure: INSERTION OF THREE PIECE PENILE PROSTHESIS;  Surgeon: Azul Vines MD;  Location: [facility];  Service: Urology   • PROSTATE BIOPSY  2014          Social History     Tobacco Use   • Smoking status: Former     Packs/day: 0 50     Years: 68 00     Pack years: 34 00     Types: Cigarettes     Start date: 5     Quit date: 2023     Years since quittin 1   • Smokeless tobacco: Never   Vaping Use   • Vaping Use: Never used   Substance Use Topics   • Alcohol use: Yes     Comment: 2 a day   • Drug use: No     Family History   Problem Relation Age of Onset   • No Known Problems Mother    • No Known Problems Father           Allergies:      Allergies   Allergen Reactions   • Penicillins Hives and Other (See Comments)     Other reaction(s): facial swelling  Reaction Unknown   • Meloxicam Rash        Current Medications:     Current Outpatient Medications   Medication Instructions   • albuterol (Proventil HFA) 90 mcg/act inhaler 2 puffs, Inhalation, Every 6 hours PRN • ascorbic acid (VITAMIN C) 500 mg, Oral, 2 times daily   • cholecalciferol (VITAMIN D3) 2,000 Units, Oral, Daily   • fluticasone-umeclidinium-vilanterol (Trelegy Ellipta) 100-62 5-25 mcg/actuation inhaler 1 puff, Inhalation, Daily, Rinse mouth after use  • folic acid (FOLVITE) 1 mg, Oral, Daily   • Multiple Vitamins-Minerals (multivitamin with minerals) tablet 1 tablet, Oral, Daily   • pantoprazole (PROTONIX) 40 mg, Oral, Daily             PRE-OP WORKSHEET DATA    Assessment of Pre-Operative Risks     MLJ Quality Hard Stops:  BMI (<40) : Estimated body mass index is 19 16 kg/m² as calculated from the following:    Height as of this encounter: 5' 8" (1 727 m)  Weight as of this encounter: 57 2 kg (126 lb)  Hgb ( >11): Lab Results   Component Value Date    HGB 14 3 03/10/2023     HbA1c (<7 0) :   Lab Results   Component Value Date    HGBA1C 5 4 03/11/2023     GFR (>60) (Less then 45 = Nephrology consult):  Estimated Creatinine Clearance: 63 8 mL/min (by C-G formula based on SCr of 0 76 mg/dL)  Active Decompensated Chronic Conditions which would delay surgery  No acutely decompensated medical issues such as recent CVA, MI, new onset arrhythmia, severe aortic stenosis, CHF, uncontrolled COPD       Exercise Capacity: (if less the 4 mets consider functional assessment via cardiac stress testing or consultation)    · Able to walk 2 blocks without symptoms?: Yes  · Able to walk 1 flights without symptoms?: Yes    Assessment of intra and post operative respiratory, hemodynamic and thrombotic risks     Prior Anesthesia Reactions: No     Personal history of venous thromboembolic disease? No    History of steroid use > 5 mg for >2 weeks within last year?  No    Cardiac Risk Estimation: per the Revised Cardiac Risk Index (Circ  100:1043, 1999),     The patient's risk factors for cardiac complications include :  none    Emi Goldman has a in hospital cardiac risk of RCI RISK CLASS I (0 risk factors, risk of major cardiac compl  appr  0 5%) based on RCRI calculator          Pre-Op Data Reviewed:       Laboratory Results: I have personally reviewed the pertinent laboratory results/reports     EKG:I have personally reviewed pertinent reports    I personally reviewed and interpreted available tracings in the electronic medical record  EKG was done in the family practice office I did request and reviewed a copy of this tracing and there is no suggestive ischemia or other concerning findings on this EKG    OLD RECORDS: reviewed old records in the chart review section if EHR on day of visit      Previous cardiopulmonary studies within the past year:  · Echocardiogram: yes, 2016 EF 60%  · Cardiac Catheterization: no  · Stress Test: no      Time of visit including pre-visit chart review, visit and post-visit coordination of plan and care , review of pre-surgical lab work, preparation and time spent documenting note in electronic medical record, time spent face-to-face in physical examination answering patient questions by care team 45 minutes             [address] [place] [address]

## 2023-03-15 ENCOUNTER — OFFICE VISIT (OUTPATIENT)
Age: 78
End: 2023-03-15

## 2023-03-15 VITALS
BODY MASS INDEX: 19.1 KG/M2 | HEART RATE: 62 BPM | DIASTOLIC BLOOD PRESSURE: 68 MMHG | WEIGHT: 126 LBS | OXYGEN SATURATION: 96 % | HEIGHT: 68 IN | SYSTOLIC BLOOD PRESSURE: 132 MMHG

## 2023-03-15 DIAGNOSIS — C61 PROSTATE CANCER (HCC): ICD-10-CM

## 2023-03-15 DIAGNOSIS — M16.11 PRIMARY OSTEOARTHRITIS OF ONE HIP, RIGHT: ICD-10-CM

## 2023-03-15 DIAGNOSIS — Z72.0 TOBACCO ABUSE: ICD-10-CM

## 2023-03-15 DIAGNOSIS — J43.1 PANLOBULAR EMPHYSEMA (HCC): Primary | ICD-10-CM

## 2023-03-15 DIAGNOSIS — K21.9 GASTROESOPHAGEAL REFLUX DISEASE, UNSPECIFIED WHETHER ESOPHAGITIS PRESENT: ICD-10-CM

## 2023-03-15 LAB
DME PARACHUTE DELIVERY DATE ACTUAL: NORMAL
DME PARACHUTE DELIVERY DATE REQUESTED: NORMAL
DME PARACHUTE ITEM DESCRIPTION: NORMAL
DME PARACHUTE ORDER STATUS: NORMAL
DME PARACHUTE SUPPLIER NAME: NORMAL
DME PARACHUTE SUPPLIER PHONE: NORMAL

## 2023-03-16 ENCOUNTER — CLINICAL SUPPORT (OUTPATIENT)
Dept: INTERNAL MEDICINE CLINIC | Facility: OTHER | Age: 78
End: 2023-03-16

## 2023-03-16 DIAGNOSIS — Z01.818 PRE-OP EXAM: Primary | ICD-10-CM

## 2023-03-17 ENCOUNTER — PATIENT OUTREACH (OUTPATIENT)
Dept: OBGYN CLINIC | Facility: HOSPITAL | Age: 78
End: 2023-03-17

## 2023-03-21 ENCOUNTER — EVALUATION (OUTPATIENT)
Dept: PHYSICAL THERAPY | Facility: REHABILITATION | Age: 78
End: 2023-03-21

## 2023-03-21 DIAGNOSIS — M16.11 PRIMARY OSTEOARTHRITIS OF ONE HIP, RIGHT: ICD-10-CM

## 2023-03-21 NOTE — PROGRESS NOTES
PT Evaluation     Today's date: 3/21/2023  Patient name: Tyson Erikcson  : 1945  MRN: [id number]  Referring provider: Lana Anderson PA-C  Dx:   Encounter Diagnosis     ICD-10-CM    1  Primary osteoarthritis of one hip, right  M16 11 Ambulatory referral to Physical Therapy                     Assessment  Assessment details: Pt is a pleasant 76 y o  male presenting to outpatient physical therapy with Primary osteoarthritis of one hip, right for pre-operative consultation appointment  Virtual Home Assessment reviewed, educated patient in gait and stair mechanics, provided transfer training, and answered questions regarding negotiating around the home  Patient issued HEP for immediate post-op  Patient scheduled for first post-operative physical therapy session 3 days following surgery  Pt would benefit from skilled physical therapy to address limitations and to achieve goals  Thank you for this referral    Impairments: abnormal coordination, abnormal gait, abnormal or restricted ROM, activity intolerance, impaired balance, impaired physical strength and pain with function  Understanding of Dx/Px/POC: good   Prognosis: good    Goals  ST  Patient will report 25% decrease in pain in 4 weeks  2  Patient will demonstrate 25% improvement in ROM in 4 weeks  3  Patient will demonstrate 1/2 grade improvement in strength in 4 weeks  4  Patient will be able to independently ambulate 250+ feet with SPC in 4 weeks  LT  Patient will be able to perform IADLS without restriction or pain by discharge  2  Patient will be independent in HEP by discharge  3  Patient will be able to return to recreational/work duties without restriction or pain by discharge  4  Patient will be able to ambulate community distances without compensation by discharge       Plan  Patient would benefit from: PT eval and skilled PT  Planned modality interventions: cryotherapy  Planned therapy interventions: IADL retraining, body mechanics training, flexibility, functional ROM exercises, home exercise program, neuromuscular re-education, manual therapy, postural training, strengthening, stretching, therapeutic activities, therapeutic exercise, gait training, transfer training, self care, patient education, balance/weight bearing training and ADL retraining  Frequency: 2x week  Duration in visits: 20  Duration in weeks: 12  Treatment plan discussed with: patient        Subjective Evaluation    History of Present Illness  Mechanism of injury: Pt reports that he is getting a hip replacement on 4/5  Pts that his is giving him pain all the time     Pain  Current pain ratin  Quality: "it's just a pain that hits"   Aggravating factors: standing, walking and stair climbing    Social Support  Steps to enter house: yes  Stairs in house: yes   Lives in: multiple-level home  Lives with: alone    Patient Goals  Patient goals for therapy: decreased pain, improved balance, increased motion, increased strength, independence with ADLs/IADLs and return to sport/leisure activities          Objective     Active Range of Motion   Left Hip   External rotation (90/90): 30 degrees   Internal rotation (90/90): 30 degrees     Right Hip   External rotation (90/90): 20 degrees   Internal rotation (90/90): 20 degrees     Strength/Myotome Testing     Left Hip   Planes of Motion   Flexion: 4+  Abduction: 4    Right Hip   Planes of Motion   Flexion: 4  Abduction: 4    Left Knee   Flexion: 4+  Extension: 4+    Right Knee   Flexion: 4+  Extension: 4+    Left Ankle/Foot   Dorsiflexion: 5  Plantar flexion: 5    Right Ankle/Foot   Dorsiflexion: 5  Plantar flexion: 5    Ambulation     Comments   5xSTS 13 06 seconds              Precautions: COPD, Hx Prostate cancer, hiatal hernia, former smoker      3/21            FOTO             IE/RE Pre-OP IE              Manuals             R hip PROM                                                    Neuro Re-Ed             Hip add iso Hip abd iso             Quad set              Glute Set                                                     Ther Ex             Bike ROM             Heel slide              HR/TR             LAQ/SAQ                                                                 Ther Activity                                       Gait Training             With RW              With [address]             Modalities             CP

## 2023-03-22 ENCOUNTER — PATIENT OUTREACH (OUTPATIENT)
Dept: OBGYN CLINIC | Facility: HOSPITAL | Age: 78
End: 2023-03-22

## 2023-03-22 ENCOUNTER — TELEPHONE (OUTPATIENT)
Dept: INTERNAL MEDICINE CLINIC | Facility: OTHER | Age: 78
End: 2023-03-22

## 2023-03-22 RX ORDER — ACETAMINOPHEN 500 MG
500 TABLET ORAL EVERY 6 HOURS PRN
Status: ON HOLD | COMMUNITY
End: 2023-04-05 | Stop reason: SDUPTHER

## 2023-03-22 NOTE — PROGRESS NOTES
[facility] made second attempt to reach pt in regard to caregiver support and transportation  [facility] was able to reach pt today and introduced self and role  Pt was sharing how he is grouchy because he is in so much pain and is hoping to feel better post op  Pt also shared that he has had so many appointments leading up to the surgery  SW provided supportive listening  [facility] then inquire dif pt has caregiver support, per pt he has his sister  and numerous other people that will help him post op  Pt did not give any specific names besides his sister  Per pt he will see how much help he needs post op  [facility] also spoke to pt about self pay [facility] options and pt is not interested because per pt he has people to call  [facility] educated pt on the importance of having caregiver support planned post op and pt again insisted there is no concern with caregiver support  Pt shared his sister will take him to and from surgery and will stay and help him with set up the first day home  [facility] inquired if pt has transportation to and from OP PT and again pt stated he has friends that he can call  [facility] spoke to pt about the [employer] and pt declined  [facility] also inquired of pt is interested on Meals on Wheels and pt declined  Per pt if he is hungry he can order food through [employer]  Pt is very clear he wants to DC to home  Pt declined any resources [facility] offered  [facility] will inform NN of same

## 2023-03-22 NOTE — TELEPHONE ENCOUNTER
Spoke to patient. Pt does not want to pay $350 and does not want to get test done.   Cancelled order in Epic

## 2023-03-22 NOTE — TELEPHONE ENCOUNTER
Spoke to  Ashley. Pt has [employer] and they have different age qualification than Medicare. They cap the age limit at 68 .  Pt would have to pay $350 for the test.      [facility] for pt to call me at [facility] office

## 2023-03-24 LAB
ABO GROUP BLD: NORMAL
BLD GP AB SCN SERPL QL: NEGATIVE
RH BLD: POSITIVE
SPECIMEN EXPIRATION DATE: NORMAL

## 2023-03-28 DIAGNOSIS — J43.1 PANLOBULAR EMPHYSEMA (HCC): ICD-10-CM

## 2023-04-03 ENCOUNTER — ANESTHESIA EVENT (OUTPATIENT)
Dept: PERIOP | Facility: HOSPITAL | Age: 78
End: 2023-04-03

## 2023-04-05 ENCOUNTER — HOSPITAL ENCOUNTER (OUTPATIENT)
Facility: HOSPITAL | Age: 78
Setting detail: OUTPATIENT SURGERY
Discharge: HOME | End: 2023-04-06
Attending: STUDENT IN AN ORGANIZED HEALTH CARE EDUCATION/TRAINING PROGRAM | Admitting: STUDENT IN AN ORGANIZED HEALTH CARE EDUCATION/TRAINING PROGRAM

## 2023-04-05 ENCOUNTER — ANESTHESIA (OUTPATIENT)
Dept: PERIOP | Facility: HOSPITAL | Age: 78
End: 2023-04-05

## 2023-04-05 ENCOUNTER — APPOINTMENT (OUTPATIENT)
Dept: RADIOLOGY | Facility: HOSPITAL | Age: 78
End: 2023-04-05

## 2023-04-05 DIAGNOSIS — M16.11 PRIMARY OSTEOARTHRITIS OF ONE HIP, RIGHT: Primary | ICD-10-CM

## 2023-04-05 DEVICE — PINNACLE POROCOAT ACETABULAR SHELL SECTOR II 56MM OD
Type: IMPLANTABLE DEVICE | Site: HIP | Status: FUNCTIONAL
Brand: PINNACLE POROCOAT

## 2023-04-05 DEVICE — PINNACLE CANCELLOUS BONE SCREW 6.5MM X 35MM
Type: IMPLANTABLE DEVICE | Site: HIP | Status: FUNCTIONAL
Brand: PINNACLE

## 2023-04-05 DEVICE — ACTIS DUOFIX HIP PROSTHESIS (FEMORAL STEM 12/14 TAPER CEMENTLESS SIZE 9 HIGH COLLAR)  CE
Type: IMPLANTABLE DEVICE | Site: HIP | Status: FUNCTIONAL
Brand: ACTIS

## 2023-04-05 DEVICE — PINNACLE HIP SOLUTIONS ALTRX POLYETHYLENE ACETABULAR LINER NEUTRAL 40MM ID 56MM OD
Type: IMPLANTABLE DEVICE | Site: HIP | Status: FUNCTIONAL
Brand: PINNACLE ALTRX

## 2023-04-05 DEVICE — BIOLOX DELTA TS CERAMIC FEMORAL HEAD 12/14 TAPER REVISION DIAMETER 40MM +8.5
Type: IMPLANTABLE DEVICE | Site: HIP | Status: FUNCTIONAL
Brand: BIOLOX DELTA

## 2023-04-05 RX ORDER — CEFAZOLIN SODIUM 2 G/50ML
2000 SOLUTION INTRAVENOUS ONCE
Status: DISCONTINUED | OUTPATIENT
Start: 2023-04-05 | End: 2023-04-05

## 2023-04-05 RX ORDER — OXYCODONE HYDROCHLORIDE 5 MG/1
5 TABLET ORAL EVERY 4 HOURS PRN
Status: DISCONTINUED | OUTPATIENT
Start: 2023-04-05 | End: 2023-04-06 | Stop reason: HOSPADM

## 2023-04-05 RX ORDER — ASPIRIN 81 MG/1
81 TABLET ORAL 2 TIMES DAILY
Qty: 60 TABLET | Refills: 0 | Status: SHIPPED | OUTPATIENT
Start: 2023-04-05

## 2023-04-05 RX ORDER — AMOXICILLIN 250 MG
1 CAPSULE ORAL DAILY
Qty: 30 TABLET | Refills: 0 | Status: SHIPPED | OUTPATIENT
Start: 2023-04-05

## 2023-04-05 RX ORDER — SODIUM CHLORIDE, SODIUM LACTATE, POTASSIUM CHLORIDE, CALCIUM CHLORIDE 600; 310; 30; 20 MG/100ML; MG/100ML; MG/100ML; MG/100ML
125 INJECTION, SOLUTION INTRAVENOUS CONTINUOUS
Status: DISCONTINUED | OUTPATIENT
Start: 2023-04-05 | End: 2023-04-05

## 2023-04-05 RX ORDER — CEFADROXIL 500 MG/1
500 CAPSULE ORAL EVERY 12 HOURS SCHEDULED
Qty: 10 CAPSULE | Refills: 0 | Status: SHIPPED | OUTPATIENT
Start: 2023-04-05 | End: 2023-04-10

## 2023-04-05 RX ORDER — ACETAMINOPHEN 325 MG/1
975 TABLET ORAL EVERY 8 HOURS
Status: DISCONTINUED | OUTPATIENT
Start: 2023-04-05 | End: 2023-04-06 | Stop reason: HOSPADM

## 2023-04-05 RX ORDER — EPHEDRINE SULFATE 50 MG/ML
INJECTION INTRAVENOUS AS NEEDED
Status: DISCONTINUED | OUTPATIENT
Start: 2023-04-05 | End: 2023-04-05

## 2023-04-05 RX ORDER — ONDANSETRON 4 MG/1
4 TABLET, FILM COATED ORAL EVERY 8 HOURS PRN
Qty: 20 TABLET | Refills: 0 | Status: SHIPPED | OUTPATIENT
Start: 2023-04-05

## 2023-04-05 RX ORDER — PROPOFOL 10 MG/ML
INJECTION, EMULSION INTRAVENOUS AS NEEDED
Status: DISCONTINUED | OUTPATIENT
Start: 2023-04-05 | End: 2023-04-05

## 2023-04-05 RX ORDER — BUPIVACAINE HYDROCHLORIDE 7.5 MG/ML
INJECTION, SOLUTION INTRASPINAL AS NEEDED
Status: DISCONTINUED | OUTPATIENT
Start: 2023-04-05 | End: 2023-04-05

## 2023-04-05 RX ORDER — OXYCODONE HYDROCHLORIDE 5 MG/1
10 TABLET ORAL EVERY 4 HOURS PRN
Status: DISCONTINUED | OUTPATIENT
Start: 2023-04-05 | End: 2023-04-06 | Stop reason: HOSPADM

## 2023-04-05 RX ORDER — CEFAZOLIN SODIUM 2 G/50ML
2000 SOLUTION INTRAVENOUS EVERY 8 HOURS
Status: COMPLETED | OUTPATIENT
Start: 2023-04-06 | End: 2023-04-06

## 2023-04-05 RX ORDER — DEXAMETHASONE SODIUM PHOSPHATE 10 MG/ML
INJECTION, SOLUTION INTRAMUSCULAR; INTRAVENOUS AS NEEDED
Status: DISCONTINUED | OUTPATIENT
Start: 2023-04-05 | End: 2023-04-05 | Stop reason: HOSPADM

## 2023-04-05 RX ORDER — CHLORHEXIDINE GLUCONATE 0.12 MG/ML
15 RINSE ORAL ONCE
Status: COMPLETED | OUTPATIENT
Start: 2023-04-05 | End: 2023-04-05

## 2023-04-05 RX ORDER — FENTANYL CITRATE/PF 50 MCG/ML
25 SYRINGE (ML) INJECTION
Status: DISCONTINUED | OUTPATIENT
Start: 2023-04-05 | End: 2023-04-06

## 2023-04-05 RX ORDER — TRANEXAMIC ACID 10 MG/ML
INJECTION, SOLUTION INTRAVENOUS AS NEEDED
Status: DISCONTINUED | OUTPATIENT
Start: 2023-04-05 | End: 2023-04-05

## 2023-04-05 RX ORDER — BUPIVACAINE HYDROCHLORIDE AND EPINEPHRINE 2.5; 5 MG/ML; UG/ML
INJECTION, SOLUTION EPIDURAL; INFILTRATION; INTRACAUDAL; PERINEURAL AS NEEDED
Status: DISCONTINUED | OUTPATIENT
Start: 2023-04-05 | End: 2023-04-05 | Stop reason: HOSPADM

## 2023-04-05 RX ORDER — ONDANSETRON 2 MG/ML
4 INJECTION INTRAMUSCULAR; INTRAVENOUS EVERY 6 HOURS PRN
Status: DISCONTINUED | OUTPATIENT
Start: 2023-04-05 | End: 2023-04-06 | Stop reason: HOSPADM

## 2023-04-05 RX ORDER — MIDAZOLAM HYDROCHLORIDE 2 MG/2ML
INJECTION, SOLUTION INTRAMUSCULAR; INTRAVENOUS AS NEEDED
Status: DISCONTINUED | OUTPATIENT
Start: 2023-04-05 | End: 2023-04-05

## 2023-04-05 RX ORDER — ONDANSETRON 2 MG/ML
4 INJECTION INTRAMUSCULAR; INTRAVENOUS ONCE AS NEEDED
Status: DISCONTINUED | OUTPATIENT
Start: 2023-04-05 | End: 2023-04-05 | Stop reason: SDUPTHER

## 2023-04-05 RX ORDER — CALCIUM CARBONATE 200(500)MG
1000 TABLET,CHEWABLE ORAL DAILY PRN
Status: DISCONTINUED | OUTPATIENT
Start: 2023-04-05 | End: 2023-04-06 | Stop reason: HOSPADM

## 2023-04-05 RX ORDER — SODIUM CHLORIDE, SODIUM LACTATE, POTASSIUM CHLORIDE, CALCIUM CHLORIDE 600; 310; 30; 20 MG/100ML; MG/100ML; MG/100ML; MG/100ML
100 INJECTION, SOLUTION INTRAVENOUS CONTINUOUS
Status: DISCONTINUED | OUTPATIENT
Start: 2023-04-05 | End: 2023-04-06 | Stop reason: HOSPADM

## 2023-04-05 RX ORDER — ACETAMINOPHEN 500 MG
1000 TABLET ORAL EVERY 8 HOURS
Qty: 60 TABLET | Refills: 0 | Status: SHIPPED | OUTPATIENT
Start: 2023-04-05

## 2023-04-05 RX ORDER — HYDROMORPHONE HCL/PF 1 MG/ML
0.5 SYRINGE (ML) INJECTION
Status: DISCONTINUED | OUTPATIENT
Start: 2023-04-05 | End: 2023-04-06

## 2023-04-05 RX ORDER — PROPOFOL 10 MG/ML
INJECTION, EMULSION INTRAVENOUS CONTINUOUS PRN
Status: DISCONTINUED | OUTPATIENT
Start: 2023-04-05 | End: 2023-04-05

## 2023-04-05 RX ORDER — FLUTICASONE FUROATE AND VILANTEROL 100; 25 UG/1; UG/1
1 POWDER RESPIRATORY (INHALATION) DAILY
Status: DISCONTINUED | OUTPATIENT
Start: 2023-04-06 | End: 2023-04-06 | Stop reason: HOSPADM

## 2023-04-05 RX ORDER — OXYCODONE HYDROCHLORIDE 5 MG/1
5 TABLET ORAL EVERY 4 HOURS PRN
Qty: 42 TABLET | Refills: 0 | Status: SHIPPED | OUTPATIENT
Start: 2023-04-05 | End: 2023-04-15

## 2023-04-05 RX ORDER — DOCUSATE SODIUM 100 MG/1
100 CAPSULE, LIQUID FILLED ORAL 2 TIMES DAILY
Status: DISCONTINUED | OUTPATIENT
Start: 2023-04-05 | End: 2023-04-06 | Stop reason: HOSPADM

## 2023-04-05 RX ORDER — CHLORHEXIDINE GLUCONATE 4 G/100ML
SOLUTION TOPICAL DAILY PRN
Status: DISCONTINUED | OUTPATIENT
Start: 2023-04-05 | End: 2023-04-05

## 2023-04-05 RX ORDER — ONDANSETRON 2 MG/ML
INJECTION INTRAMUSCULAR; INTRAVENOUS AS NEEDED
Status: DISCONTINUED | OUTPATIENT
Start: 2023-04-05 | End: 2023-04-05

## 2023-04-05 RX ORDER — ASPIRIN 81 MG/1
81 TABLET ORAL 2 TIMES DAILY
Status: DISCONTINUED | OUTPATIENT
Start: 2023-04-05 | End: 2023-04-06 | Stop reason: HOSPADM

## 2023-04-05 RX ORDER — TRANEXAMIC ACID 10 MG/ML
1000 INJECTION, SOLUTION INTRAVENOUS ONCE
Status: COMPLETED | OUTPATIENT
Start: 2023-04-05 | End: 2023-04-05

## 2023-04-05 RX ORDER — KETOROLAC TROMETHAMINE 30 MG/ML
INJECTION, SOLUTION INTRAMUSCULAR; INTRAVENOUS AS NEEDED
Status: DISCONTINUED | OUTPATIENT
Start: 2023-04-05 | End: 2023-04-05 | Stop reason: HOSPADM

## 2023-04-05 RX ORDER — FENTANYL CITRATE 50 UG/ML
INJECTION, SOLUTION INTRAMUSCULAR; INTRAVENOUS AS NEEDED
Status: DISCONTINUED | OUTPATIENT
Start: 2023-04-05 | End: 2023-04-05

## 2023-04-05 RX ORDER — ACETAMINOPHEN 325 MG/1
975 TABLET ORAL ONCE
Status: COMPLETED | OUTPATIENT
Start: 2023-04-05 | End: 2023-04-05

## 2023-04-05 RX ORDER — MAGNESIUM HYDROXIDE 1200 MG/15ML
LIQUID ORAL AS NEEDED
Status: DISCONTINUED | OUTPATIENT
Start: 2023-04-05 | End: 2023-04-05 | Stop reason: HOSPADM

## 2023-04-05 RX ORDER — GABAPENTIN 300 MG/1
300 CAPSULE ORAL
Status: DISCONTINUED | OUTPATIENT
Start: 2023-04-05 | End: 2023-04-06 | Stop reason: HOSPADM

## 2023-04-05 RX ORDER — SENNOSIDES 8.6 MG
1 TABLET ORAL DAILY
Status: DISCONTINUED | OUTPATIENT
Start: 2023-04-06 | End: 2023-04-06 | Stop reason: HOSPADM

## 2023-04-05 RX ORDER — MAGNESIUM HYDROXIDE/ALUMINUM HYDROXICE/SIMETHICONE 120; 1200; 1200 MG/30ML; MG/30ML; MG/30ML
30 SUSPENSION ORAL EVERY 6 HOURS PRN
Status: DISCONTINUED | OUTPATIENT
Start: 2023-04-05 | End: 2023-04-06 | Stop reason: HOSPADM

## 2023-04-05 RX ORDER — CEFAZOLIN SODIUM 1 G/3ML
INJECTION, POWDER, FOR SOLUTION INTRAMUSCULAR; INTRAVENOUS AS NEEDED
Status: DISCONTINUED | OUTPATIENT
Start: 2023-04-05 | End: 2023-04-05

## 2023-04-05 RX ORDER — ALBUTEROL SULFATE 90 UG/1
2 AEROSOL, METERED RESPIRATORY (INHALATION) EVERY 6 HOURS PRN
Status: DISCONTINUED | OUTPATIENT
Start: 2023-04-05 | End: 2023-04-06 | Stop reason: HOSPADM

## 2023-04-05 RX ADMIN — FENTANYL CITRATE 25 MCG: 50 INJECTION, SOLUTION INTRAMUSCULAR; INTRAVENOUS at 15:37

## 2023-04-05 RX ADMIN — SODIUM CHLORIDE, SODIUM LACTATE, POTASSIUM CHLORIDE, AND CALCIUM CHLORIDE 125 ML/HR: .6; .31; .03; .02 INJECTION, SOLUTION INTRAVENOUS at 14:16

## 2023-04-05 RX ADMIN — MIDAZOLAM 1 MG: 1 INJECTION INTRAMUSCULAR; INTRAVENOUS at 15:30

## 2023-04-05 RX ADMIN — CEFAZOLIN 2000 MG: 1 INJECTION, POWDER, FOR SOLUTION INTRAMUSCULAR; INTRAVENOUS at 15:25

## 2023-04-05 RX ADMIN — OXYCODONE HYDROCHLORIDE 5 MG: 5 TABLET ORAL at 21:46

## 2023-04-05 RX ADMIN — PROPOFOL 30 MG: 10 INJECTION, EMULSION INTRAVENOUS at 15:31

## 2023-04-05 RX ADMIN — FENTANYL CITRATE 25 MCG: 50 INJECTION, SOLUTION INTRAMUSCULAR; INTRAVENOUS at 15:21

## 2023-04-05 RX ADMIN — EPHEDRINE SULFATE 15 MG: 50 INJECTION INTRAVENOUS at 17:00

## 2023-04-05 RX ADMIN — PROPOFOL 60 MCG/KG/MIN: 10 INJECTION, EMULSION INTRAVENOUS at 15:31

## 2023-04-05 RX ADMIN — ACETAMINOPHEN 975 MG: 325 TABLET, FILM COATED ORAL at 21:46

## 2023-04-05 RX ADMIN — ONDANSETRON 4 MG: 2 INJECTION INTRAMUSCULAR; INTRAVENOUS at 17:08

## 2023-04-05 RX ADMIN — FENTANYL CITRATE 25 MCG: 50 INJECTION, SOLUTION INTRAMUSCULAR; INTRAVENOUS at 15:31

## 2023-04-05 RX ADMIN — MIDAZOLAM 1 MG: 1 INJECTION INTRAMUSCULAR; INTRAVENOUS at 15:14

## 2023-04-05 RX ADMIN — FENTANYL CITRATE 25 MCG: 50 INJECTION, SOLUTION INTRAMUSCULAR; INTRAVENOUS at 15:14

## 2023-04-05 RX ADMIN — TRANEXAMIC ACID 1000 MG: 10 INJECTION, SOLUTION INTRAVENOUS at 14:16

## 2023-04-05 RX ADMIN — BUPIVACAINE HYDROCHLORIDE IN DEXTROSE 1.8 ML: 7.5 INJECTION, SOLUTION SUBARACHNOID at 15:45

## 2023-04-05 RX ADMIN — ASPIRIN 81 MG: 81 TABLET, COATED ORAL at 21:46

## 2023-04-05 RX ADMIN — DOCUSATE SODIUM 100 MG: 100 CAPSULE, LIQUID FILLED ORAL at 19:53

## 2023-04-05 RX ADMIN — SODIUM CHLORIDE, SODIUM LACTATE, POTASSIUM CHLORIDE, AND CALCIUM CHLORIDE: .6; .31; .03; .02 INJECTION, SOLUTION INTRAVENOUS at 16:54

## 2023-04-05 RX ADMIN — TRANEXAMIC ACID 1000 MG: 10 INJECTION, SOLUTION INTRAVENOUS at 15:19

## 2023-04-05 RX ADMIN — ACETAMINOPHEN 975 MG: 325 TABLET, FILM COATED ORAL at 14:14

## 2023-04-05 RX ADMIN — CHLORHEXIDINE GLUCONATE 15 ML: 1.2 RINSE ORAL at 14:15

## 2023-04-05 RX ADMIN — CEFAZOLIN SODIUM 2000 MG: 2 SOLUTION INTRAVENOUS at 23:55

## 2023-04-05 RX ADMIN — GABAPENTIN 300 MG: 300 CAPSULE ORAL at 21:46

## 2023-04-05 RX ADMIN — SODIUM CHLORIDE, SODIUM LACTATE, POTASSIUM CHLORIDE, AND CALCIUM CHLORIDE 100 ML/HR: .6; .31; .03; .02 INJECTION, SOLUTION INTRAVENOUS at 19:53

## 2023-04-05 NOTE — PHYSICAL THERAPY NOTE
Physical Therapy Cancellation Note       04/05/23 1808   PT Last Visit   PT Visit Date 04/05/23   Note Type   Note type Cancelled Session   Cancel Reasons Other   Additional Comments Patient out of OR at 5:30  Currently not medically appropriate for P T  evaluation  Will continue to follow and evaluation in AM    Leigha Branham

## 2023-04-05 NOTE — DISCHARGE INSTR - AVS FIRST PAGE
Dr Cisneros Catnurys Hip Replacement    What to Expect/Activity  You are weight bearing as tolerated to your operative leg unless otherwise instructed  Use your walker or crutches  It is important to get up and walk for 10 minutes every hour, if possible  Initial recovery therapy is focused on walking  If in your follow-up a referral to formal physical therapy is required, this will be provided based on your recovery  You may sleep however you would like  Many patients feel more comfortable with a pillow between their legs and find it uncomfortably to lay on the operative side  If you do roll on that side at night you will not damage the replacement  You may use a regular or elevated toilet seat, whichever is more comfortable  We do not routinely require any specific precautions in terms of positioning of your leg and if so this will be taught to you by the physical therapists at the hospital  This means that you can dress as you are comfortable, including shoes and socks  You can bend down carefully to get items from the floor  Swelling and discomfort causes pain  Elevate your surgical leg on 1-2 pillows placed behind your ankle/calf throughout the day, especially when you are laying down  Please use ice packs for 20-30 minutes every 1-2 hours for 48-72 hours on the operative hip  Please make sure there is a barrier directly between your skin and your ice/ice pack  Please use incentive spirometer 10 times per hour while awake (see diagram below)  Dressing/Wound Care/Bathing  There is a surgical dressing over your incision that stays in place for 7 days after surgery  You may start showering 24 hours after surgery, the surgical dressing will remain in place  Please pat the dressing dry  If you notice the dressing appears saturated or is starting to come off, please replace with a dry dressing  Keep the dressing on until your follow-up in the office  There may be dried blood around the incision   It is ok to continue showering after removing the dressing but do not scrub the incision  Pat incision dry  Do not place any creams, ointments or gels on or around the incision  No baths, swimming or submerging until cleared by Dr Jacob Webb  Pain Management/Medications  You may resume your usual medications  Please take the following medications:  Anti-coagulation (blood clot prevention) - aspirin 81mg twice daily for 4 weeks  Pain medication:  Narcotic Medication: Take as directed  NSAID (Anti-inflammatory): Take as directed  Tylenol 1000mg every 8 hours  Zofran (ondasetron) - 4mg every 8 hours as needed for nausea  Stool Softeners (senna/colace)- take daily to help prevent constipation as narcotic pain medication causes constipation  Antibiotic - take as directed if prescribed  If you have questions or pain concerns, please contact the office  Pain medication cannot remove all post-operative pain  Follow up/Call if:  The findings of your surgery will be explained to you and your family immediately after surgery  However, in the post-operative period, during recovery from anesthesia you may not fully remember or fully understand what was said  We will go over this again when you return for your post-op appointment    Please contact Dr Judy Pablo office if you experience the following:  Excessive bleeding (bleeding through your dressing)  Fever greater than 101 degrees F after the first 2 days (a slight fever is normal the first few days after surgery)  Persistent nausea or vomiting  Decreased sensation or discoloration of the operative limb  Pain or swelling that is getting worse and not better with medication    Dr Leo Crespo: [phone]

## 2023-04-05 NOTE — OP NOTE
OPERATIVE REPORT  PATIENT NAME: Yaritza Agrawal    :  1945  MRN: [id number]  Pt Location: [facility] OR ROOM 02    SURGERY DATE: 2023    Surgeon(s) and Role:     * Keegan Arteaga DO - Primary     * Stacy Butts PA-C - Assisting     * SWEETIE Mendiola - Assisting     Preop Diagnosis:  Primary osteoarthritis of one hip, right [M16 11]    Post-Op Diagnosis Codes:     * Primary osteoarthritis of one hip, right [M16 11]    Procedure(s):  Right - ARTHROPLASTY HIP TOTAL ANTERIOR  NAVIGATED    Specimen(s):  * No specimens in log *    Estimated Blood Loss:   200 mL    Drains:  * No LDAs found *    Anesthesia Type:   Spinal    Operative Indications:  Primary osteoarthritis of one hip, right [M16 11]  77M with severe end-stage osteoarthritis of the right hip  He has severe, bone on bone hip OA as well as arthritic changes of the lumbar spine  On physical exam he has significantly limited ROM of the right hip with pain  He has tried and failed conservative treatment options including cortisone injection, Medrol Dosepak, Tylenol, contact activities, activity modifications  The patient has elected to proceed with right MANDO through the anterior approach  Risks and benefits of surgery to include but not limited to bleeding, infection, damage to surrounding structures, hardware failure, instability, fracture, dislocation, leg length inequality, need for further surgery, continued pain, stiffness, blood clots, stroke, heart attack, and LFCN numbness was discussed with the patient  Operative Findings:  Severe osteoarthritis  Large peripheral osteophytes  Native femoral head 53 mm    Implant Name Type Inv   Item Serial No   Lot No  LRB No  Used Action   SHELL ACETABULAR 56MM POROUS SOLID LCK RING PINNACLE - [id number]  SHELL ACETABULAR 56MM POROUS SOLID LCK RING PINNACLE  DEPUY [id number] Right 1 Implanted   SCREW CANC 6 5 X 35MM SLF TAP PINNACLE - [id number]  SCREW CANC 6 5 X 35MM SLF TAP PINNACLE  DEPUY [id number] Right 1 Implanted   LINER ACTB 40 X 56MM NEUTRAL ALTRX - [id number]  LINER ACTB 40 X 56MM NEUTRAL ALTRX  DEPUY [id number] Right 1 Implanted   STEM FEM SZ 9 TAPER CMNTLS HI COLLAR ACTIS - [id number]  STEM FEM SZ 9 TAPER CMNTLS HI COLLAR ACTIS  DEPUY [id number] Right 1 Implanted   HEAD FEMORAL 12/14 TPR 40MM +8 5MM BIOLOX DELTA TS ARTICULEZE - [id number]  HEAD FEMORAL 12/14 TPR 40MM +8 5MM BIOLOX DELTA TS ARTICULEZE  DEPUY [id number] Right 1 Implanted       Complications:   None    Procedure and Technique:  The patient seen in the preoperative area  The informed consent was confirmed  The operative site was confirmed and marked  Patient was taken to the operating room and general anesthesia was performed by the anesthesiologist   The patient's bilateral lower extremities were well padded and placed in the in the hana table boots  Patient was secured to the operating room table and all bony prominences were well padded  Patient was given perioperative antibiotics per scip protocol  A formal time-out was performed identifying the patient and correct surgical site  The right lower extremity was prepped and draped in usual sterile fashion  A 10 cm incision was made anteriorly approximately 2 fingerbreadths lateral and 3 fingerbreadths distal to ASIS avoiding the hip flexion crease sharply with a 10 blade  Electrocautery was used to cauterize skin bleeders  This was carried down through subcutaneous tissues to the level of the tensor fascia juliette fascia  Fascia was incised in line with the muscle fibers  The tensor fascia juliette muscle was then moved laterally and the inferior fascia was incised  The branches of the lateral femoral cutaneous vessles were coagulated with electrocautery  Retractors were placed carefully around the femoral neck  Pericapsular fat was removed  The anterior capsule was removed in its entirety  Retractors were then carefully placed around the femoral neck    Femoral neck was resected in line with our template  A napkin ring was performed to better facilitate removal of the head  Femoral head was removed with corkscrew  Femoral head measured approximately 53 mm but was significantly deformed  At this time retractors were carefully placed around the acetabulum  The labrum was removed sharply  The inferior capsule was released  Pulvinar was was removed to expose the medial wall of the acetabulum  We started sequentially reaming at a size 49 medialized the medial wall  We then sequentially reamed up to a size 55 with excellent chatter the Reamer  We then impacted the [place] sector cluster hole 56 mm cup into place with the guidance of the [place] hip navigation  Our cup was placed at approximately 40° of abduction and 20 anteversion per navigation guidance  We drilled, measured and placed 1 screw up the posterior column to augment fixation  Cup was well fixed and we placed the neutral [place] liner  The liner was inspected and assured to be well seated  At this time all the retractors were removed and attention was drawn to the femur  Retractors were placed over the greater trochanter and along the medial calcar exposing the top of the femur  Medial capsule was released off the calcar and lateral capsule was resected allowing elevation of the femur into the wound  We used the canal finer to localize the canal   We then started broaching sequentially up to a size 9 Actis  Between the size 8 and 9 reamer the broach started to pot in the distal femur  At this time we used the Actis reamers up to a size 9  We then broached up to a size 9 with excellent rotation stability  The size 9 had excellent rotational stability and canal fill on fluoro  We calcar planed down to the level of stem  We trialed the hip with both std offset and high offset with a + 5 and + 8 5 trial head    The hip had excellent stability to 30° of internal rotation and 90° of external rotation with restoration of leg length and offset with the high offset and + 8 5 head  This was confirmed with intraoperative navigation via the [employer] hip navigation platform  The trial femoral stem was removed and the real size 9 high offset stem was impacted into place  The 40 mm +8 5 TS ceramic head was impacted onto a dry trunnion  At this time the hip was lavaged with Irrisept solution  Hip was then irrigated with remainder of 3 L of normal saline solution  The deep tissues were again inspected for any bleeding from the lateral femoral cutaneous vessels or muscle bleeders  Any bleeders were cauterized with electrocautery  The fascia was then closed with interrupted 0 Vicryl followed by running #1 Stratafix suture  The subcutaneous tissue was closed with interrupted 2-0 Vicryl  The skin was then closed with running 4-0 Stratafix  A Prineo dressing was placed sterilely  The patient was awoken from anesthesia and taken to recovery room in stable condition  I was present for the entire case incision to closure  Sophie Sifuentes PA-C was necessary for positioning, retracting, and suturing  No qualified resident was available for the case       Patient Disposition:  PACU       77M s/p R MANDO 4/5  - multi-modal pain control   - mac-op abx x 24hrs, duricef x 5 days    - DVT ppx: asprin 81mg BID x 4 weeks  - PT/OT  - WBAT  - No formal hip precautions, no extremes of motion  - keep dressing in place until follow-up  - f/u 10-14 days      SIGNATURE: Dominick Lugo DO  DATE: April 5, 2023  TIME: 5:22 PM

## 2023-04-05 NOTE — ANESTHESIA POSTPROCEDURE EVALUATION
Post-Op Assessment Note    CV Status:  Stable    Pain management: adequate     Mental Status:  Alert and awake   Hydration Status:  Euvolemic   PONV Controlled:  Controlled   Airway Patency:  Patent      Post Op Vitals Reviewed: Yes      Staff: Anesthesiologist, CRNA     + movement of feet  There were no known notable events for this encounter      /65 (04/05/23 1730)    Temp 97 6 °F (36 4 °C) (04/05/23 1730)    Pulse 61 (04/05/23 1730)   Resp 16 (04/05/23 1730)    SpO2 98 % (04/05/23 1730)

## 2023-04-05 NOTE — OCCUPATIONAL THERAPY NOTE
Occupational Therapy Cx Note     Patient Name: Pastora MONTES'S Date: 4/5/2023  Problem List  Principal Problem:    Primary osteoarthritis of one hip, right              04/05/23 1810   OT Last Visit   OT Visit Date 04/05/23   Note Type   Note type Cancelled Session   Cancel Reasons Other   Additional Comments Patient out of OR at 5:30  Currently not medically appropriate for OT evaluation   Will follow as able & appropriate in AM        WALLY Marcos/EUGENE

## 2023-04-05 NOTE — INTERVAL H&P NOTE
H&P reviewed  After examining the patient I find no changes in the patients condition since the H&P had been written  Plan for right MANDO

## 2023-04-05 NOTE — ANESTHESIA PROCEDURE NOTES
Spinal Block    Patient location during procedure: OR  Start time: 4/5/2023 3:29 PM  Reason for block: procedure for pain and at surgeon's request  Staffing  Performed: Anesthesiologist and CRNA   Anesthesiologist: Cameron Mcmanus MD  Resident/CRNA: Shira Martin CRNA  Preanesthetic Checklist  Completed: patient identified, IV checked, site marked, risks and benefits discussed, surgical consent, monitors and equipment checked, pre-op evaluation and timeout performed  Spinal Block  Patient position: sitting  Prep: ChloraPrep  Patient monitoring: cardiac monitor and frequent blood pressure checks  Approach: right paramedian  Location: L3-4  Injection technique: single-shot  Needle  Needle type: pencil-tip   Needle gauge: 25 G  Needle length: 10 cm  Assessment  Sensory level: T4  Injection Assessment:  negative aspiration for heme, no paresthesia on injection and positive aspiration for clear CSF    Post-procedure:  adhesive bandage applied, pressure dressing applied, secured with tape, site cleaned and sterile dressing applied

## 2023-04-05 NOTE — ANESTHESIA PREPROCEDURE EVALUATION
Procedure:  ARTHROPLASTY HIP TOTAL ANTERIOR,NAVIGATED (Right: Hip)    Relevant Problems   GI/HEPATIC   (+) Esophageal reflux   (+) Hiatal hernia      /RENAL   (+) Prostate cancer (HCC)      MUSCULOSKELETAL   (+) Hiatal hernia   (+) Osteoarthritis of one hip, right   (+) Primary osteoarthritis of one hip, right      PULMONARY   (+) Chronic obstructive pulmonary disease (HCC)      Allergy to penicillin - per patient ~20 years ago, face got 'wrinkly' not necessarily swollen  Denies SOB, denies rash  Discussed with surgery will attempt test dose ancef  Physical Exam    Airway    Mallampati score: II         Dental   implants,     Cardiovascular  Cardiovascular exam normal    Pulmonary  Pulmonary exam normal     Other Findings        Anesthesia Plan  ASA Score- 3     Anesthesia Type- spinal with ASA Monitors  Additional Monitors:   Airway Plan:           Plan Factors-    Chart reviewed  EKG reviewed  Existing labs reviewed  Patient summary reviewed  Induction- intravenous  Postoperative Plan-     Informed Consent- Anesthetic plan and risks discussed with patient  I personally reviewed this patient with the CRNA  Discussed and agreed on the Anesthesia Plan with the CRNA  Daryl Herron

## 2023-04-05 NOTE — ANESTHESIA PROCEDURE NOTES
Spinal Block    Patient location during procedure: OR  Start time: 4/5/2023 3:35 PM  Reason for block: procedure for pain  Staffing  Resident/CRNA: Sharad Pineda, CRNA  Preanesthetic Checklist  Completed: patient identified, IV checked, site marked, risks and benefits discussed, surgical consent, monitors and equipment checked, pre-op evaluation and timeout performed  Spinal Block  Patient position: sitting  Prep: ChloraPrep  Patient monitoring: heart rate, frequent blood pressure checks and continuous pulse ox  Approach: midline  Location: L3-4  Injection technique: single-shot  Needle  Needle type: short-bevel   Needle gauge: 25 G  Needle length: 5 cm  Assessment  Sensory level: T4  Injection Assessment:  negative aspiration for heme, no paresthesia on injection and positive aspiration for clear CSF    Post-procedure:  site cleaned

## 2023-04-06 VITALS
TEMPERATURE: 98.1 F | DIASTOLIC BLOOD PRESSURE: 60 MMHG | HEIGHT: 68 IN | OXYGEN SATURATION: 92 % | BODY MASS INDEX: 19.01 KG/M2 | HEART RATE: 82 BPM | RESPIRATION RATE: 18 BRPM | WEIGHT: 125.44 LBS | SYSTOLIC BLOOD PRESSURE: 115 MMHG

## 2023-04-06 PROBLEM — D72.829 LEUCOCYTOSIS: Status: ACTIVE | Noted: 2023-04-06

## 2023-04-06 LAB
ANION GAP SERPL CALCULATED.3IONS-SCNC: 5 MMOL/L (ref 4–13)
BUN SERPL-MCNC: 24 MG/DL (ref 5–25)
CALCIUM SERPL-MCNC: 8.8 MG/DL (ref 8.4–10.2)
CHLORIDE SERPL-SCNC: 108 MMOL/L (ref 96–108)
CO2 SERPL-SCNC: 25 MMOL/L (ref 21–32)
CREAT SERPL-MCNC: 0.94 MG/DL (ref 0.6–1.3)
ERYTHROCYTE [DISTWIDTH] IN BLOOD BY AUTOMATED COUNT: 13.6 % (ref 11.6–15.1)
GFR SERPL CREATININE-BSD FRML MDRD: 76 ML/MIN/1.73SQ M
GLUCOSE SERPL-MCNC: 140 MG/DL (ref 65–140)
HCT VFR BLD AUTO: 35.2 % (ref 36.5–49.3)
HGB BLD-MCNC: 11.3 G/DL (ref 12–17)
MCH RBC QN AUTO: 32.2 PG (ref 26.8–34.3)
MCHC RBC AUTO-ENTMCNC: 32.1 G/DL (ref 31.4–37.4)
MCV RBC AUTO: 100 FL (ref 82–98)
PLATELET # BLD AUTO: 237 THOUSANDS/UL (ref 149–390)
PMV BLD AUTO: 9.5 FL (ref 8.9–12.7)
POTASSIUM SERPL-SCNC: 4.5 MMOL/L (ref 3.5–5.3)
RBC # BLD AUTO: 3.51 MILLION/UL (ref 3.88–5.62)
SODIUM SERPL-SCNC: 138 MMOL/L (ref 135–147)
WBC # BLD AUTO: 18.3 THOUSAND/UL (ref 4.31–10.16)

## 2023-04-06 RX ORDER — PANTOPRAZOLE SODIUM 40 MG/1
40 TABLET, DELAYED RELEASE ORAL
Status: DISCONTINUED | OUTPATIENT
Start: 2023-04-06 | End: 2023-04-06 | Stop reason: HOSPADM

## 2023-04-06 RX ADMIN — UMECLIDINIUM 1 PUFF: 62.5 AEROSOL, POWDER ORAL at 09:02

## 2023-04-06 RX ADMIN — DOCUSATE SODIUM 100 MG: 100 CAPSULE, LIQUID FILLED ORAL at 09:00

## 2023-04-06 RX ADMIN — FLUTICASONE FUROATE AND VILANTEROL TRIFENATATE 1 PUFF: 100; 25 POWDER RESPIRATORY (INHALATION) at 09:02

## 2023-04-06 RX ADMIN — PANTOPRAZOLE SODIUM 40 MG: 40 TABLET, DELAYED RELEASE ORAL at 05:12

## 2023-04-06 RX ADMIN — OXYCODONE HYDROCHLORIDE 5 MG: 5 TABLET ORAL at 09:10

## 2023-04-06 RX ADMIN — SENNOSIDES 8.6 MG: 8.6 TABLET, FILM COATED ORAL at 09:00

## 2023-04-06 RX ADMIN — ASPIRIN 81 MG: 81 TABLET, COATED ORAL at 09:00

## 2023-04-06 RX ADMIN — ACETAMINOPHEN 975 MG: 325 TABLET, FILM COATED ORAL at 05:12

## 2023-04-06 RX ADMIN — CEFAZOLIN SODIUM 2000 MG: 2 SOLUTION INTRAVENOUS at 09:00

## 2023-04-06 RX ADMIN — ACETAMINOPHEN 975 MG: 325 TABLET, FILM COATED ORAL at 13:05

## 2023-04-06 NOTE — PHYSICAL THERAPY NOTE
"                                                                                  PHYSICAL THERAPY EVAL/TX  Physical Therapy Evaluation    Performed at least 2 patient identifiers during session:  Patient Active Problem List   Diagnosis    Smith esophagus    Chronic obstructive pulmonary disease (HCC)    Erectile dysfunction    Tobacco abuse    Esophageal reflux    Lung nodules    Hiatal hernia    Elevated prostate specific antigen (PSA)    Prostate cancer ([facility] )    Screening for lung cancer    Right hip pain    Osteoarthritis of one hip, right    Moderate protein-calorie malnutrition ([facility] )    Former smoker    Weight loss    Primary osteoarthritis of one hip, right    Leucocytosis       Past Medical History:   Diagnosis Date    Arthritis     Smith's esophagus     Chronic cough     per pt since quit smoking less coughing-slightly yellow    Chronic pain disorder     hip    Colon polyp     COPD (chronic obstructive pulmonary disease) (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 )     per pt not sure of this    Right hip pain     Shortness of breath     \"with activity not if sitting or sleeping\"    Shoulder pain     left--\"feels like cracking\"    Spermatocele     \"had surgery\"       Past Surgical History:   Procedure Laterality Date    CERVICAL FUSION      COLONOSCOPY      CT NEEDLE BIOPSY LUNG  07/09/2019    DENTAL IMPLANT      EGD      FL INJECTION RIGHT HIP (NON ARTHROGRAM)  11/25/2022    HERNIA REPAIR      INSERTION PROSTATE RADIATION SEED      MO INSJ MULTI-COMPONENT INFLATABLE PENILE PROSTH N/A 03/08/2016    Procedure: INSERTION OF THREE PIECE PENILE PROSTHESIS;  Surgeon: Junie Beatty MD;  [facility];  Service: Urology    PROSTATE BIOPSY  " "01/06/2014    ROTATOR CUFF REPAIR Bilateral           04/06/23 0830   PT Last Visit   PT Visit Date 04/06/23   Note Type   Note type Evaluation   Pain Assessment   Pain Assessment Tool 0-10   Pain Score No Pain   Restrictions/Precautions   Weight Bearing Precautions Per Order Yes   RLE Weight Bearing Per Order WBAT   Other Precautions Fall Risk   Home Living   Type of Home House   Home Layout Two level  (1st floor setup  1 step inside the home to the living room  0STE )   Bathroom Shower/Tub Tub/shower unit   Home Equipment Walker   Additional Comments Was not using an AD prior to admission   Prior Function   Level of [place] Independent with ADLs; Independent with functional mobility; Independent with IADLS   Lives With Alone   Receives Help From Family  (sister and niece)   IADLs Independent with driving; Independent with meal prep; Independent with medication management   Falls in the last 6 months 0   Vocational Full time employment  ([employer])   General   Family/Caregiver Present No   Cognition   Overall Cognitive Status WFL   Arousal/Participation Alert   Attention Within functional limits   Orientation Level Oriented X4   Memory Within functional limits   Following Commands Follows all commands and directions without difficulty   Subjective   Subjective \"When can I go home? \"   LLE Assessment   LLE Assessment WFL   Light Touch   RLE Light Touch Grossly intact   LLE Light Touch Grossly intact   Bed Mobility   Supine to Sit 6  Modified independent   Transfers   Sit to Stand 6  Modified independent   Additional items Armrests   Stand to Sit 6  Modified independent   Additional items Armrests   Ambulation/Elevation   Gait pattern   (Step through pattern, decreased stance on the R, slight circumduction on the R)   Gait Assistance 5  Supervision   Additional items Verbal cues  (for proper sequencing)   Assistive Device Rolling walker   Distance 150ft   Ambulation/Elevation Additional Comments Refer to " treatment session for additional mobility   Balance   Static Sitting Normal   Dynamic Sitting Good   Static Standing Fair +   Dynamic Standing Fair +   Ambulatory Fair +   Endurance Deficit   Endurance Deficit No   Activity Tolerance   Activity Tolerance Patient tolerated treatment well   Medical Staff Made Aware Daisy BALLESTEROS   Nurse Made Aware Armando DURBIN   Assessment   Prognosis Good   Problem List Decreased strength; Impaired balance;Decreased mobility   Assessment Patient is a 65y/o M POD 1 anterior R MANDO  Patient resides alone in a home with a first floor setup  1 step inside to get to living room  Patient is independent at baseline  Current medical status includes WBAT, pain, bed alarm, decreased ROM, strength, balance, and mobility  Patient tolerated session well  He was educated on proper height of RW, proper sequencing with RW and proper stair technique  Also educated on elevation and use of ice  Patient able to perform all mobility at a Keyla/supervision level  He ambulated a household distance and complete a curb step  He will be able to return home with outpatient P T  The patient's AM-[facility] Basic Mobility Inpatient Short Form Raw Score is 21  A Raw score of greater than 17 suggests the patient may benefit from discharge to home  Please also refer to the recommendation of the Physical Therapist for safe discharge planning  Barriers to Discharge None   Goals   Patient Goals To go home today   STG Expiration Date 04/07/23   Short Term Goal #1 1  Ambulate 300ft with a RW mod I level 2  Ascend/descend 1 curb step with RW mod i level   PT Treatment Day 1   Plan   Treatment/Interventions Functional transfer training;LE strengthening/ROM; Elevations; Therapeutic exercise;Equipment eval/education; Bed mobility;Gait training;Spoke to nursing;OT   PT Frequency Twice a day   Recommendation   PT Discharge Recommendation Home with outpatient rehabilitation   AM-PAC Basic Mobility Inpatient   Turning in Flat Bed Without Bedrails 4   Lying on Back to Sitting on Edge of Flat Bed Without Bedrails 4   Moving Bed to Chair 4   Standing Up From Chair Using Arms 3   Walk in Room 3   Climb 3-5 Stairs With Railing 3   Basic Mobility Inpatient Raw Score 21   Basic Mobility Standardized Score 45 55   Highest Level Of Mobility   -HLM Goal 6: Walk 10 steps or more   JH-HLM Achieved 7: Walk 25 feet or more   Additional Treatment Session   Start Time 0845   End Time 0855   Treatment Assessment Ascended/desceded 1 curb step twice with RW at a min A x 1 level  Progressed to supervision for 2nd trial  Verbal cues and demonstration provided  Equipment Use RW   End of Consult   Patient Position at End of Consult Seated edge of bed  (OT present with patient)     Kelly Branham PT             Patient Name: Marques NESS Date: 4/6/2023

## 2023-04-06 NOTE — PLAN OF CARE
Problem: PHYSICAL THERAPY ADULT  Goal: Performs mobility at highest level of function for planned discharge setting  See evaluation for individualized goals  Description: Treatment/Interventions: Functional transfer training, LE strengthening/ROM, Elevations, Therapeutic exercise, Equipment eval/education, Bed mobility, Gait training, Spoke to nursing, OT          See flowsheet documentation for full assessment, interventions and recommendations  Note: Prognosis: Good  Problem List: Decreased strength, Impaired balance, Decreased mobility  Assessment: Patient is a 67y/o M POD 1 anterior R MANDO  Patient resides alone in a home with a first floor setup  1 step inside to get to living room  Patient is independent at baseline  Current medical status includes WBAT, pain, bed alarm, decreased ROM, strength, balance, and mobility  Patient tolerated session well  He was educated on proper height of RW, proper sequencing with RW and proper stair technique  Also educated on elevation and use of ice  Patient able to perform all mobility at a Keyla/supervision level  He ambulated a household distance and complete a curb step  He will be able to return home with outpatient P T  The patient's AM-PAC Basic Mobility Inpatient Short Form Raw Score is 21  A Raw score of greater than 17 suggests the patient may benefit from discharge to home  Please also refer to the recommendation of the Physical Therapist for safe discharge planning  Barriers to Discharge: None     PT Discharge Recommendation: Home with outpatient rehabilitation    See flowsheet documentation for full assessment

## 2023-04-06 NOTE — NURSING NOTE
Reviewed ortho d/c instructions with pt  Reviewed f/u appt  s and new rx scripts with pt  Pt  Was d/c'd to home and transported by his family  Pt  taken to lobby via [facility] by volunteer

## 2023-04-06 NOTE — OCCUPATIONAL THERAPY NOTE
"    Occupational Therapy Evaluation     Patient Name: Rosemarie Oh  TLTEO'N Date: 4/6/2023  Problem List  Principal Problem:    Primary osteoarthritis of one hip, right  Active Problems:    Smith esophagus    Chronic obstructive pulmonary disease ([address] )    Leucocytosis    Past Medical History  Past Medical History:   Diagnosis Date    Arthritis     Smith's esophagus     Chronic cough     per pt since quit smoking less coughing-slightly yellow    Chronic pain disorder     hip    Colon polyp     COPD (chronic obstructive pulmonary disease) ([address] )     Dental bridge present     Esophageal reflux     GERD (gastroesophageal reflux disease)     History of kidney stones     History of radiation therapy     Inguinal hernia     \"had surgery\"    Moderate exercise     has landscape business and enjoys remodeling properties    Rectal adenocarcinoma ([facility] )     per pt not sure of this    Right hip pain     Shortness of breath     \"with activity not if sitting or sleeping\"    Shoulder pain     left--\"feels like cracking\"    Spermatocele     \"had surgery\"     Past Surgical History  Past Surgical History:   Procedure Laterality Date    CERVICAL FUSION      COLONOSCOPY      CT NEEDLE BIOPSY LUNG  07/09/2019    DENTAL IMPLANT      EGD      FL INJECTION RIGHT HIP (NON ARTHROGRAM)  11/25/2022    HERNIA REPAIR      INSERTION PROSTATE RADIATION SEED      SD INSJ MULTI-COMPONENT INFLATABLE PENILE PROSTH N/A 03/08/2016    Procedure: INSERTION OF THREE PIECE PENILE PROSTHESIS;  Surgeon: Vianey Fernandez MD;  [facility];  Service: Urology    PROSTATE BIOPSY  01/06/2014    ROTATOR CUFF REPAIR Bilateral              04/06/23 0903   OT Last Visit   OT Visit Date 04/06/23   Note Type   Note type Evaluation   Pain Assessment   Pain Assessment Tool 0-10   Pain Score No Pain   Pain Location/Orientation Orientation: Right;Location: Hip   Restrictions/Precautions   Weight Bearing Precautions Per Order Yes   RLE Weight Bearing Per Order " "WBAT   Other Precautions THR; Fall Risk   Home Living   Type of [address] Layout Two level  (FFSU, 1 step into living room, 0 JOHN)   Bathroom Shower/Tub Tub/shower unit   Home Equipment Walker   Additional Comments Was not using AD prior - just gave shower chair away  Pt states he can purchase another if he needs   Prior Function   Level of [place] Independent with ADLs; Independent with functional mobility; Independent with IADLS   Lives With Alone   Receives Help From Family  (Niece & sister)   IADLs Independent with driving; Independent with meal prep; Independent with medication management   Falls in the last 6 months 0   Vocational Full time employment   Lifestyle   Autonomy Independent at baseline   Reciprocal Relationships Supportive sister & niece   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   General   Family/Caregiver Present No   Subjective   Subjective \"I thought I would be home alreadY\"   ADL   Eating Assistance 7  Independent   Grooming Assistance 7  Independent   UB Bathing Assistance [address]  7  Independent   Additional Comments Educated on LB dressing compensatory strategies   Bed Mobility   Additional Comments Received sitting EOB   Transfers   Sit to Stand 6  Modified independent   Stand to Sit 6  Modified independent   Functional Mobility   Functional Mobility 6  Modified independent   Additional items Rolling walker   Balance   Static Sitting Good   Dynamic Sitting Good   Static Standing Good   Dynamic Standing Good   Ambulatory Good   Activity Tolerance   Activity Tolerance Patient tolerated treatment well   Medical Staff Made Aware PT Jazz   Nurse Made Aware RN Armando   RUE Assessment   RUE Assessment WFL   LUE Assessment   LUE Assessment WFL   Cognition   Overall Cognitive Status WFL " Arousal/Participation Alert   Attention Within functional limits   Orientation Level Oriented X4   Memory Within functional limits   Following Commands Follows all commands and directions without difficulty   Assessment   Prognosis Good   Assessment Pt is a 76 y o  male seen for OT evaluation at [address]  [facility], admitted 4/5/2023 w/ Primary osteoarthritis of one hip, right  Pt is POD#1 R THR with anterior approach  OT completed expanded review of pt's medical and social history  Comorbidities affecting pt's functional performance at time of assessment include: COPD, hx of prostate CA, malnutrition, leucocytosis  Prior to admission, pt was living alone in a [facility] with a FFSU with 0 JOHN, 1 JOHN living room  Pt was independent with ADL/IADL/Mobility  Upon evaluation, pt presents to OT at functional baseline while being Mod I for mobility with RW  Based on findings, pt is of moderate complexity  The patient's raw score on the AM-PAC Daily Activity inpatient short form is 24, standardized score is 57 54, greater than 39 4  Patients at this level are likely to benefit from DC to home  Please refer to the recommendation of the Occupational Therapist for safe DC planning  At this time, OT recommendations at time of discharge are no OT needs  No further acute OT needs indicated at this time - Recommend pt continue to be OOB for meals, ambulation to/from BR, perform self care tasks, and mobility in hallway with nursing  D/C from OT caseload with above recommendations     Goals   Patient Goals Pt wants to go home today   Plan   OT Frequency Eval only   Recommendation   OT Discharge Recommendation No rehabilitation needs   Equipment Recommended Shower/Tub chair with back ($)   AM-PAC Daily Activity Inpatient   Lower Body Dressing 4   Bathing 4   Toileting 4   Upper Body Dressing 4   Grooming 4   Eating 4   Daily Activity Raw Score 24   Daily Activity Standardized Score (Calc for Raw Score >=11) 57 54   AM-PAC Applied Cognition Inpatient   Following a Speech/Presentation 4   Understanding Ordinary Conversation 4   Taking Medications 4   Remembering Where Things Are Placed or Put Away 4   Remembering List of 4-5 Errands 4   Taking Care of Complicated Tasks 4   Applied Cognition Raw Score 24   Applied Cognition Standardized Score 62 21   End of Consult   Education Provided Yes   Patient Position at End of Consult Bedside chair;Bed/Chair alarm activated; All needs within reach   Nurse Communication Nurse aware of consult     Serjio Hu OTR/EUGENE

## 2023-04-06 NOTE — PLAN OF CARE
Problem: Nutrition/Hydration-ADULT  Goal: Nutrient/Hydration intake appropriate for improving, restoring or maintaining nutritional needs  Description: Monitor and assess patient's nutrition/hydration status for malnutrition  Collaborate with interdisciplinary team and initiate plan and interventions as ordered  Monitor patient's weight and dietary intake as ordered or per policy  Utilize nutrition screening tool and intervene as necessary  Determine patient's food preferences and provide high-protein, high-caloric foods as appropriate       INTERVENTIONS:  - Monitor oral intake, urinary output, labs, and treatment plans  - Assess nutrition and hydration status and recommend course of action  - Evaluate amount of meals eaten  - Assist patient with eating if necessary   - Allow adequate time for meals  - Recommend/ encourage appropriate diets, oral nutritional supplements, and vitamin/mineral supplements  - Order, calculate, and assess calorie counts as needed  - Recommend, monitor, and adjust tube feedings and TPN/PPN based on assessed needs  - Assess need for intravenous fluids  - Provide specific nutrition/hydration education as appropriate  - Include patient/family/caregiver in decisions related to nutrition  Outcome: Progressing     Problem: Prexisting or High Potential for Compromised Skin Integrity  Goal: Skin integrity is maintained or improved  Description: INTERVENTIONS:  - Identify patients at risk for skin breakdown  - Assess and monitor skin integrity  - Assess and monitor nutrition and hydration status  - Monitor labs   - Assess for incontinence   - Turn and reposition patient  - Assist with mobility/ambulation  - Relieve pressure over bony prominences  - Avoid friction and shearing  - Provide appropriate hygiene as needed including keeping skin clean and dry  - Evaluate need for skin moisturizer/barrier cream  - Collaborate with interdisciplinary team   - Patient/family teaching  - Consider wound care consult   Outcome: Progressing     Problem: MOBILITY - ADULT  Goal: Maintain or return to baseline ADL function  Description: INTERVENTIONS:  -  Assess patient's ability to carry out ADLs; assess patient's baseline for ADL function and identify physical deficits which impact ability to perform ADLs (bathing, care of mouth/teeth, toileting, grooming, dressing, etc )  - Assess/evaluate cause of self-care deficits   - Assess range of motion  - Assess patient's mobility; develop plan if impaired  - Assess patient's need for assistive devices and provide as appropriate  - Encourage maximum independence but intervene and supervise when necessary  - Involve family in performance of ADLs  - Assess for home care needs following discharge   - Consider OT consult to assist with ADL evaluation and planning for discharge  - Provide patient education as appropriate  Outcome: Progressing  Goal: Maintains/Returns to pre admission functional level  Description: INTERVENTIONS:  - Perform BMAT or MOVE assessment daily    - Set and communicate daily mobility goal to care team and patient/family/caregiver  - Collaborate with rehabilitation services on mobility goals if consulted  - Perform Range of Motion x times a day  - Reposition patient every x hours    - Dangle patient x times a day  - Stand patient x times a day  - Ambulate patient x times a day  - Out of bed to chair x times a day   - Out of bed for meals x times a day  - Out of bed for toileting  - Record patient progress and toleration of activity level   Outcome: Progressing     Problem: PAIN - ADULT  Goal: Verbalizes/displays adequate comfort level or baseline comfort level  Description: Interventions:  - Encourage patient to monitor pain and request assistance  - Assess pain using appropriate pain scale  - Administer analgesics based on type and severity of pain and evaluate response  - Implement non-pharmacological measures as appropriate and evaluate response  - Consider cultural and social influences on pain and pain management  - Notify physician/advanced practitioner if interventions unsuccessful or patient reports new pain  Outcome: Progressing     Problem: INFECTION - ADULT  Goal: Absence or prevention of progression during hospitalization  Description: INTERVENTIONS:  - Assess and monitor for signs and symptoms of infection  - Monitor lab/diagnostic results  - Monitor all insertion sites, i e  indwelling lines, tubes, and drains  - Monitor endotracheal if appropriate and nasal secretions for changes in amount and color  - [place] appropriate cooling/warming therapies per order  - Administer medications as ordered  - Instruct and encourage patient and family to use good hand hygiene technique  - Identify and instruct in appropriate isolation precautions for identified infection/condition  Outcome: Progressing  Goal: Absence of fever/infection during neutropenic period  Description: INTERVENTIONS:  - Monitor WBC    Outcome: Progressing     Problem: DISCHARGE PLANNING  Goal: Discharge to home or other facility with appropriate resources  Description: INTERVENTIONS:  - Identify barriers to discharge w/patient and caregiver  - Arrange for needed discharge resources and transportation as appropriate  - Identify discharge learning needs (meds, wound care, etc )  - Arrange for interpretive services to assist at discharge as needed  - Refer to Case Management Department for coordinating discharge planning if the patient needs post-hospital services based on physician/advanced practitioner order or complex needs related to functional status, cognitive ability, or social support system  Outcome: Progressing     Problem: Knowledge Deficit  Goal: Patient/family/caregiver demonstrates understanding of disease process, treatment plan, medications, and discharge instructions  Description: Complete learning assessment and assess knowledge base    Interventions:  - Provide teaching at level of understanding  - Provide teaching via preferred learning methods  Outcome: Progressing     Problem: NEUROSENSORY - ADULT  Goal: Achieves stable or improved neurological status  Description: INTERVENTIONS  - Monitor and report changes in neurological status  - Monitor vital signs such as temperature, blood pressure, glucose, and any other labs ordered   - Initiate measures to prevent increased intracranial pressure  - Monitor for seizure activity and implement precautions if appropriate      Outcome: Progressing  Goal: Remains free of injury related to seizures activity  Description: INTERVENTIONS  - Maintain airway, patient safety  and administer oxygen as ordered  - Monitor patient for seizure activity, document and report duration and description of seizure to physician/advanced practitioner  - If seizure occurs,  ensure patient safety during seizure  - Reorient patient post seizure  - Seizure pads on all 4 side rails  - Instruct patient/family to notify RN of any seizure activity including if an aura is experienced  - Instruct patient/family to call for assistance with activity based on nursing assessment  - Administer anti-seizure medications if ordered    Outcome: Progressing  Goal: Achieves maximal functionality and self care  Description: INTERVENTIONS  - Monitor swallowing and airway patency with patient fatigue and changes in neurological status  - Encourage and assist patient to increase activity and self care     - Encourage visually impaired, hearing impaired and aphasic patients to use assistive/communication devices  Outcome: Progressing     Problem: RESPIRATORY - ADULT  Goal: Achieves optimal ventilation and oxygenation  Description: INTERVENTIONS:  - Assess for changes in respiratory status  - Assess for changes in mentation and behavior  - Position to facilitate oxygenation and minimize respiratory effort  - Oxygen administered by appropriate delivery if ordered  - Initiate smoking cessation education as indicated  - Encourage broncho-pulmonary hygiene including cough, deep breathe, Incentive Spirometry  - Assess the need for suctioning and aspirate as needed  - Assess and instruct to report SOB or any respiratory difficulty  - Respiratory Therapy support as indicated  Outcome: Progressing     Problem: SKIN/TISSUE INTEGRITY - ADULT  Goal: Skin Integrity remains intact(Skin Breakdown Prevention)  Description: Assess:  -Perform Sergei assessment every x  -Clean and moisturize skin every x  -Inspect skin when repositioning, toileting, and assisting with ADLS  -Assess under medical devices such as x every x  -Assess extremities for adequate circulation and sensation     Bed Management:  -Have minimal linens on bed & keep smooth, unwrinkled  -Change linens as needed when moist or perspiring  -Avoid sitting or lying in one position for more than x hours while in bed  -Keep [facility] at [facility]     Toileting:  -Offer bedside commode  -Assess for incontinence every x  -Use incontinent care products after each incontinent episode such as x    Activity:  -Mobilize patient x times a day  -Encourage activity and walks on unit  -Encourage or provide ROM exercises   -Turn and reposition patient every x Hours  -Use appropriate equipment to lift or move patient in bed  -Instruct/ Assist with weight shifting every x when out of bed in chair  -Consider limitation of chair time x hour intervals    Skin Care:  -Avoid use of baby powder, tape, friction and shearing, hot water or constrictive clothing  -Relieve pressure over bony prominences using x  -Do not massage red bony areas    Next Steps:  -Teach patient strategies to minimize risks such as x   -Consider consults to  interdisciplinary teams such as x  Outcome: Progressing  Goal: Incision(s), wounds(s) or drain site(s) healing without S/S of infection  Description: INTERVENTIONS  - Assess and document dressing, incision, wound bed, drain sites and surrounding tissue  - Provide patient and family education  - Perform skin care/dressing changes every x  Outcome: Progressing  Goal: Pressure injury heals and does not worsen  Description: Interventions:  - Implement low air loss mattress or specialty surface (Criteria met)  - Apply silicone foam dressing  - Instruct/assist with weight shifting every x minutes when in chair   - Limit chair time to x hour intervals  - Use special pressure reducing interventions such as x when in chair   - Apply fecal or urinary incontinence containment device   - Perform passive or active ROM every x  - Turn and reposition patient & offload bony prominences every x hours   - Utilize friction reducing device or surface for transfers   - Consider consults to  interdisciplinary teams such as x  - Use incontinent care products after each incontinent episode such as x  - Consider nutrition services referral as needed  Outcome: Progressing     Problem: MUSCULOSKELETAL - ADULT  Goal: Maintain or return mobility to safest level of function  Description: INTERVENTIONS:  - Assess patient's ability to carry out ADLs; assess patient's baseline for ADL function and identify physical deficits which impact ability to perform ADLs (bathing, care of mouth/teeth, toileting, grooming, dressing, etc )  - Assess/evaluate cause of self-care deficits   - Assess range of motion  - Assess patient's mobility  - Assess patient's need for assistive devices and provide as appropriate  - Encourage maximum independence but intervene and supervise when necessary  - Involve family in performance of ADLs  - Assess for home care needs following discharge   - Consider OT consult to assist with ADL evaluation and planning for discharge  - Provide patient education as appropriate  Outcome: Progressing  Goal: Maintain proper alignment of affected body part  Description: INTERVENTIONS:  - Support, maintain and protect limb and body alignment  - Provide patient/ family with appropriate education  Outcome: Progressing     Problem: HEMATOLOGIC - ADULT  Goal: Maintains hematologic stability  Description: INTERVENTIONS  - Assess for signs and symptoms of bleeding or hemorrhage  - Monitor labs  - Administer supportive blood products/factors as ordered and appropriate  Outcome: Progressing     Problem: Potential for Falls  Goal: Patient will remain free of falls  Description: INTERVENTIONS:  - Educate patient/family on patient safety including physical limitations  - Instruct patient to call for assistance with activity   - Consult OT/PT to assist with strengthening/mobility   - Keep Call bell within reach  - Keep bed low and locked with side rails adjusted as appropriate  - Keep care items and personal belongings within reach  - Initiate and maintain comfort rounds  - Make Fall Risk Sign visible to staff  - Offer Toileting every x Hours, in advance of need  - Initiate/Maintain xalarm  - Obtain necessary fall risk management equipment: x  - Apply yellow socks and bracelet for high fall risk patients  - Consider moving patient to room near nurses station  Outcome: Progressing     Problem: SAFETY ADULT  Goal: Patient will remain free of falls  Description: INTERVENTIONS:  - Educate patient/family on patient safety including physical limitations  - Instruct patient to call for assistance with activity   - Consult OT/PT to assist with strengthening/mobility   - Keep Call bell within reach  - Keep bed low and locked with side rails adjusted as appropriate  - Keep care items and personal belongings within reach  - Initiate and maintain comfort rounds  - Make Fall Risk Sign visible to staff  - Offer Toileting every x Hours, in advance of need  - Initiate/Maintain x alarm  - Obtain necessary fall risk management equipment: x  - Apply yellow socks and bracelet for high fall risk patients  - Consider moving patient to room near nurses station  Outcome: Progressing

## 2023-04-06 NOTE — ASSESSMENT & PLAN NOTE
· History of severe COPD  · Maintained on Trelegy outpatient  · Agree with continuing Breo and Incruse inpatient  · No signs of acute exacerbation

## 2023-04-06 NOTE — CASE MANAGEMENT
Case Management Assessment & Discharge Planning Note    Patient name Hardik Guajardo /-42 MRN [id number]  : 1945 Date 2023       Current Admission Date: 2023  Current Admission Diagnosis:Primary osteoarthritis of one hip, right   Patient Active Problem List    Diagnosis Date Noted   • Leucocytosis 2023   • Primary osteoarthritis of one hip, right 2023   • Former smoker 2023   • Weight loss 2023   • Moderate protein-calorie malnutrition ([facility] [address] ) 2021   • Osteoarthritis of one hip, right 10/29/2019   • Right hip pain 10/21/2019   • Screening for lung cancer 2019   • Esophageal reflux 2018   • Erectile dysfunction 2017   • Hiatal hernia 2017   • Lung nodules 2016   • Tobacco abuse 2016   • Smith esophagus 2015   • Prostate cancer ([facility] [address] ) 2015   • Chronic obstructive pulmonary disease ([facility] [address] ) 2014   • Elevated prostate specific antigen (PSA) 2013      LOS (days): 0  Geometric Mean LOS (GMLOS) (days):   Days to GMLOS:     OBJECTIVE:              Current admission status: Outpatient Surgery       Preferred Pharmacy:   [address] [place] [id number]  Phone: [phone] Fax: [phone]    Primary Care Provider: Lacy Mon MD    Primary Insurance: BLUE CROSS  Secondary Insurance:     ASSESSMENT:  Michael Irene Proxies    There are no active Health Care Proxies on file         Advance Directives  Does patient have a Health Care POA?: Yes  Does patient have Advance Directives?: Yes  Advance Directives: Living will, Power of  for health care  Primary Contact: Sister, [address]         Readmission [facility]  30 Day Readmission: No    Patient Information  Admitted from[de-identified] Home  Mental Status: Alert  During Assessment patient was accompanied by: Not accompanied during assessment  Assessment information provided by[de-identified] Patient  Primary Caregiver: Self  Support Systems: Family members  [place] of Residence: [address] do you live in?: [address] entry access options   Select all that apply : No steps to enter home  Type of Current Residence: 2 story home  Upon entering residence, is there a bedroom on the main floor (no further steps)?: Yes  Upon entering residence, is there a bathroom on the main floor (no further steps)?: Yes  In the last 12 months, was there a time when you were not able to pay the mortgage or rent on time?: No  In the last 12 months, how many places have you lived?: 1  In the last 12 months, was there a time when you did not have a steady place to sleep or slept in a shelter (including now)?: No  Homeless/housing insecurity resource given?: N/A  Living Arrangements: Lives Alone    Activities of Daily Living Prior to Admission  Functional Status: Independent  Completes ADLs independently?: Yes  Ambulates independently?: Yes  Does patient use assisted devices?: No  Does patient currently own DME?: Yes  What DME does the patient currently own?: [employer]  Does patient have a history of Outpatient Therapy (PT/OT)?: No  Does the patient have a history of Short-Term Rehab?: No  Does patient have a history of HHC?: No  [facility] No         Patient Information Continued  Income Source: Self-employed  Does patient have prescription coverage?: Yes  Within the past 12 months, you worried that your food would run out before you got the money to buy more : Never true  Within the past 12 months, the food you bought just didn't last and you didn't have money to get more : Never true  Food insecurity resource given?: N/A  Does patient receive dialysis treatments?: No  Does patient have a history of substance abuse?: No  Does patient have a history of Mental Health Diagnosis?: No         Means of Transportation  [facility] Family transport  In the past 12 months, has lack of transportation kept you from medical appointments or from getting medications?: No  In the past 12 months, has lack of transportation kept you from meetings, work, or from getting things needed for daily living?: No  Was application for public transport provided?: N/A        DISCHARGE DETAILS:    Discharge planning discussed with[de-identified] Patient  Freedom of Choice: Yes  Comments - Freedom of Choice: Patient prefers OP PT  CM contacted family/caregiver?: No- see comments (Pt declined need for CM to contact family)  Were Treatment Team discharge recommendations reviewed with patient/caregiver?: Yes  Did patient/caregiver verbalize understanding of patient care needs?: Yes  Were patient/caregiver advised of the risks associated with not following Treatment Team discharge recommendations?: Yes         [address]         Is the patient interested in [facility] at discharge?: No    DME Referral Provided  Referral made for DME?: No              Treatment Team Recommendation: Home  Discharge Destination Plan[de-identified] Home  Transport at Discharge : Family                                      Additional Comments: Met with patient to discuss role of CM and any needs prior to discharge  Pt lives alone in 2 sh w/ no JOHN  Indp PTA  Pt owns walker  Pt uses [employer]  No hx STR/OP PT/HH/DA/MH  Sister to transport at discharge

## 2023-04-06 NOTE — PROGRESS NOTES
Progress Note - Orthopedics   Michelle Flowers 76 y o  male MRN: [id number]  Unit/Bed#: -01      Subjective:    77 y o male postop day 1 status post right anterior MANDO with Dr Madyson Whittington  No acute events, no new complaints  Patient doing well  Pain well controlled  Denies fevers, chills, CP, SOB, N/V, numbness or tingling  Michael Bravo has no new concerns at this time  He will work with physical therapy today and get recommendations for discharge planning          Labs:  0   Lab Value Date/Time    HCT 35 2 (L) 04/06/2023 0432    HCT 44 3 03/10/2023 1623    HCT 43 4 11/01/2022 1056    HCT 40 7 05/04/2015 0932    HCT 45 5 04/23/2015 1054    HCT 44 0 04/20/2015 0955    HGB 11 3 (L) 04/06/2023 0432    HGB 14 3 03/10/2023 1623    HGB 13 9 11/01/2022 1056    HGB 13 5 05/04/2015 0932    HGB 15 2 04/23/2015 1054    HGB 14 8 04/20/2015 0955    INR 0 97 03/10/2023 1755    INR 0 97 04/23/2015 1054    WBC 18 30 (H) 04/06/2023 0432    WBC 10 86 (H) 03/10/2023 1623    WBC 8 21 11/01/2022 1056    WBC 7 23 05/04/2015 0932    WBC 10 18 (H) 04/23/2015 1054    WBC 5 86 04/20/2015 0955       Meds:    Current Facility-Administered Medications:   •  acetaminophen (TYLENOL) tablet 975 mg, 975 mg, Oral, Q8H, Dar Cooney PA-C, 975 mg at 04/06/23 5493  •  albuterol (PROVENTIL HFA,VENTOLIN HFA) inhaler 2 puff, 2 puff, Inhalation, Q6H PRN, Dar Cooney PA-C  •  aluminum-magnesium hydroxide-simethicone (MYLANTA) oral suspension 30 mL, 30 mL, Oral, Q6H PRN, Dar Cooney PA-C  •  aspirin (ECOTRIN LOW STRENGTH) EC tablet 81 mg, 81 mg, Oral, BID, Milagroscelina Fraser PA-C, 81 mg at 04/05/23 2146  •  calcium carbonate (TUMS) chewable tablet 1,000 mg, 1,000 mg, Oral, Daily PRN, Dar Cooney PA-C  •  ceFAZolin (ANCEF) IVPB (premix in dextrose) 2,000 mg 50 mL, 2,000 mg, Intravenous, Q8H, Dar Cooney PA-C, Last Rate: 100 mL/hr at 04/05/23 2355, 2,000 mg at 04/05/23 2355  •  docusate sodium (COLACE) capsule 100 mg, 100 mg, Oral, BID, Jose Ortiz PA-C, 100 mg at 04/05/23 1953  •  Fluticasone Furoate-Vilanterol 100-25 mcg/actuation 1 puff, 1 puff, Inhalation, Daily **AND** umeclidinium 62 5 mcg/actuation inhaler AEPB 1 puff, 1 puff, Inhalation, Daily, Jose Ortzi PA-C  •  gabapentin (NEURONTIN) capsule 300 mg, 300 mg, Oral, HS, Jose Ortiz PA-C, 300 mg at 04/05/23 2146  •  lactated ringers bolus 1,000 mL, 1,000 mL, Intravenous, Once PRN **AND** lactated ringers bolus 1,000 mL, 1,000 mL, Intravenous, Once PRN, Jose Ortiz PA-C  •  lactated ringers infusion, 100 mL/hr, Intravenous, Continuous, Jose Ortiz PA-C, Stopped at 04/06/23 8599  •  morphine injection 2 mg, 2 mg, Intravenous, Q2H PRN, Jose Ortiz PA-C  •  [facility] injection 4 mg, 4 mg, Intravenous, Q6H PRN, Jose Ortiz PA-C  •  oxyCODONE (ROXICODONE) IR tablet 10 mg, 10 mg, Oral, Q4H PRN, Jose Ortiz PA-C  •  oxyCODONE (ROXICODONE) IR tablet 5 mg, 5 mg, Oral, Q4H PRN, Jose Ortiz PA-C, 5 mg at 04/05/23 2146  •  pantoprazole (PROTONIX) EC tablet 40 mg, 40 mg, Oral, Early Morning, Julio Esparza PA-C, 40 mg at 04/06/23 2829  •  senna (SENOKOT) tablet 8 6 mg, 1 tablet, Oral, Daily, Jose Ortiz PA-C  •  sodium chloride 0 9 % bolus 1,000 mL, 1,000 mL, Intravenous, Once PRN **AND** sodium chloride 0 9 % bolus 1,000 mL, 1,000 mL, Intravenous, Once PRN, Jose Ortiz PA-C    Blood Culture:   No results found for: BLOODCX    Wound Culture:   No results found for: WOUNDCULT    Ins and Outs:  I/O last 24 hours: In: 1100 [I V :1100]  Out: 450 [Urine:250; Blood:200]          Physical:  Vitals:    04/06/23 0547   BP: 118/70   Pulse: 57   Resp: 18   Temp: 98 1 °F (36 7 °C)   SpO2: 96%     Musculoskeletal: right Lower Extremity  · Skin warm and dry  No erythema or ecchymosis  · Dressing clean, dry, intact  Pernio dressing in place    · Sensation intact to saphenous, sural, tibial, superficial peroneal nerve, and deep peroneal  · Motor intact to +FHL/EHL, +ankle dorsi/plantar flexion  · 2+ DP pulse, symmetric bilaterally  · Digits warm and well perfused  · Compartments of thigh and calf soft and compressible  · Capillary refill < 2 seconds      Assessment:    77 y o male postop day 1 status post right anterior MANDO  Plan:  · Weightbearing as tolerated right lower extremity  · Multimodal pain control  · Ancef 2 g every 8 hours for 3 doses postoperatively, Duricef for 5 days postoperatively for infection prophylaxis  · Hgb 11 3 this AM   Will monitor for ABLA and administer IVF/prbc as indicated for Greater than 2 gram drop or Hgb < 7  · PT/OT  Appreciate discharge recommendations    · DVT ppx: Aspirin 81 mg twice daily for 4 weeks postoperatively  · No formal hip precautions, no extremes of motion  · Keep dressing in place until follow-up  · Dispo: Ortho will follow   · Follow-up with Dr Tamela Lopez 10 to 14 days postoperatively    Jesse Titus PA-C

## 2023-04-06 NOTE — ASSESSMENT & PLAN NOTE
· Postop day 1 from right MANDO with orthopedics  · 200 cc blood loss per op note  · WBAT  · PT OT  · VTE prophylaxis per Ortho -ASA 81 mg BID x4 weeks  · Perioperative antibiotics per Ortho -Ancef while inpatient and then Duricef x5 days  · Discharge planning per orthopedics

## 2023-04-06 NOTE — CONSULTS
[facility]  Name: Sendy Carrasquillo I  MRN: [id number]  Unit/Bed#: -01 I Date of Admission: 4/5/2023   Date of Service: 4/6/2023 I Hospital Day: 0    Inpatient consult to Internal Medicine  Consult performed by: Balbir Voss PA-C  Consult ordered by: Carley Smith PA-C          Assessment/Plan   * Primary osteoarthritis of one hip, right  Assessment & Plan  · Postop day 1 from right MANDO with orthopedics  · 200 cc blood loss per op note  · WBAT  · PT OT  · VTE prophylaxis per Ortho -ASA 81 mg BID x4 weeks  · Perioperative antibiotics per Ortho -Ancef while inpatient and then Duricef x5 days  · Discharge planning per orthopedics    Chronic obstructive pulmonary disease ([facility] [address] )  Assessment & Plan  · History of severe COPD  · Maintained on Trelegy outpatient  · Agree with continuing Breo and Incruse inpatient  · No signs of acute exacerbation    Smith esophagus  Assessment & Plan  · History of Smith's esophagus with GERD  · Recently started PPI outpatient  · Start Protonix 40 Mg in morning         VTE Prophylaxis: VTE Score: 10 High Risk (Score >/= 5) - Pharmacological DVT Prophylaxis Ordered: per ortho primary  Sequential Compression Devices Ordered  Recommendations for Discharge:  · Continue home medications on d/c and follow-up with ortho outpatient     Total Time Spent on Date of Encounter in care of patient: 65 minutes This time was spent on one or more of the following: performing physical exam; counseling and coordination of care; obtaining or reviewing history; documenting in the medical record; reviewing/ordering tests, medications or procedures; communicating with other healthcare professionals and discussing with patient's family/caregivers  Collaboration of Care:  Were Recommendations Directly Discussed with Primary Treatment Team? No    History of Present Illness:  Sendy Carrasquillo is a 76 y o  male who is originally admitted to the [facility] "service due to right MANDO due to OA  We are consulted for medical management of COPD  Patient denies any acute symptoms  Reports that pain is well controlled  No dyspnea or chest pain  Review of Systems:  Review of Systems   Constitutional: Negative for fever  HENT: Negative for congestion  Respiratory: Negative for cough and shortness of breath  Cardiovascular: Negative for chest pain and leg swelling  Gastrointestinal: Negative for abdominal pain, constipation, diarrhea, nausea and vomiting  Genitourinary: Negative for difficulty urinating, dysuria and hematuria  Musculoskeletal: Positive for arthralgias (Right hip)  All other systems reviewed and are negative        Past Medical and Surgical History:   Past Medical History:   Diagnosis Date   • Arthritis    • Smith's esophagus    • Chronic cough     per pt since quit smoking less coughing-slightly yellow   • Chronic pain disorder     hip   • Colon polyp    • [facility]    • Dental bridge present    • Esophageal reflux    • GERD (gastroesophageal reflux disease)    • History of kidney stones    • History of radiation therapy    • Inguinal hernia     \"had surgery\"   • Moderate exercise     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   • Rectal adenocarcinoma ([facility] )     per pt not sure of this   • Right hip pain    • Shortness of breath     \"with activity not if sitting or sleeping\"   • Shoulder pain     left--\"feels like cracking\"   • Spermatocele     \"had surgery\"       Past Surgical History:   Procedure Laterality Date   • CERVICAL FUSION     • COLONOSCOPY     • CT NEEDLE BIOPSY LUNG  07/09/2019   • DENTAL IMPLANT     • EGD     • FL INJECTION RIGHT HIP (NON ARTHROGRAM)  11/25/2022   • HERNIA REPAIR     • INSERTION PROSTATE RADIATION SEED     • [place] INSJ MULTI-COMPONENT INFLATABLE PENILE PROSTH N/A 03/08/2016    Procedure: INSERTION OF THREE PIECE PENILE PROSTHESIS;  Surgeon: Jese Garcia, " "MD;  Location: [facility];  Service: Urology   • PROSTATE BIOPSY  2014   • ROTATOR CUFF REPAIR Bilateral        Meds/Allergies:  all medications and allergies reviewed    Allergies: Allergies   Allergen Reactions   • Mobic [Meloxicam] Rash   • Penicillins Hives and Other (See Comments)     Other reaction(s): facial swelling  Reaction Unknown       Social History:  Marital Status: Single  Substance Use History:   Social History     Substance and Sexual Activity   Alcohol Use Yes    Comment: 2 michelle meyers and coke per a day     Social History     Tobacco Use   Smoking Status Former   • Packs/day: 0 50   • Years: 68 00   • Pack years: 34 00   • Types: Cigarettes   • Start date: 5   • Quit date: 2023   • Years since quittin 2   Smokeless Tobacco Never     Social History     Substance and Sexual Activity   Drug Use No       Family History:  Family History   Problem Relation Age of Onset   • No Known Problems Mother    • No Known Problems Father        Physical Exam:   Vitals:   Blood Pressure: 112/65 (23 0401)  Pulse: 60 (23 0401)  Temperature: 97 9 °F (36 6 °C) (23 0401)  Temp Source: Temporal (23 1730)  Respirations: 16 (23 0401)  Height: 5' 8\" (172 7 cm) (23 1356)  Weight - Scale: 55 8 kg (123 lb) (23 1356)  SpO2: 94 % (23 0401)    Physical Exam  Vitals and nursing note reviewed  Constitutional:       General: He is not in acute distress  Appearance: Normal appearance  He is not ill-appearing  Comments: Pleasant and conversational   HENT:      Head: Normocephalic  Nose: Nose normal       Mouth/Throat:      Mouth: Mucous membranes are moist    Eyes:      Extraocular Movements: Extraocular movements intact  Conjunctiva/sclera: Conjunctivae normal    Cardiovascular:      Rate and Rhythm: Normal rate and regular rhythm  Pulses: Normal pulses  Heart sounds: No murmur heard    Pulmonary:      Effort: Pulmonary effort is normal  " No respiratory distress  Breath sounds: Normal breath sounds  Abdominal:      Palpations: Abdomen is soft  Tenderness: There is no abdominal tenderness  Musculoskeletal:      Cervical back: Normal range of motion  Left lower leg: No edema  Comments: Right hip incision with no drainage  Not warm to touch  Small amount of edema surrounding incision site  Skin:     General: Skin is warm and dry  Coloration: Skin is not pale  Neurological:      General: No focal deficit present  Mental Status: He is alert and oriented to person, place, and time  Psychiatric:         Mood and Affect: Mood normal          Thought Content: Thought content normal           Additional Data:   Lab Results:                    Lab Results   Component Value Date/Time    HGBA1C 5 4 03/11/2023 05:48 AM               Imaging: No pertinent imaging reviewed  XR hip/pelv 1 vw right if performed    (Results Pending)   XR pelvis ap only 1 or 2 vw    (Results Pending)       EKG, Pathology, and Other Studies Reviewed on Admission:   · EKG: No EKG obtained  ** Please Note: This note may have been constructed using a voice recognition system   **

## 2023-04-06 NOTE — PLAN OF CARE
Problem: Nutrition/Hydration-ADULT  Goal: Nutrient/Hydration intake appropriate for improving, restoring or maintaining nutritional needs  Description: Monitor and assess patient's nutrition/hydration status for malnutrition  Collaborate with interdisciplinary team and initiate plan and interventions as ordered  Monitor patient's weight and dietary intake as ordered or per policy  Utilize nutrition screening tool and intervene as necessary  Determine patient's food preferences and provide high-protein, high-caloric foods as appropriate       INTERVENTIONS:  - Monitor oral intake, urinary output, labs, and treatment plans  - Assess nutrition and hydration status and recommend course of action  - Evaluate amount of meals eaten  - Assist patient with eating if necessary   - Allow adequate time for meals  - Recommend/ encourage appropriate diets, oral nutritional supplements, and vitamin/mineral supplements  - Order, calculate, and assess calorie counts as needed  - Recommend, monitor, and adjust tube feedings and TPN/PPN based on assessed needs  - Assess need for intravenous fluids  - Provide specific nutrition/hydration education as appropriate  - Include patient/family/caregiver in decisions related to nutrition  4/6/2023 1151 by Yaquelin Campbell RN  Outcome: Adequate for Discharge  4/6/2023 1151 by Yaquelin Campbell RN  Outcome: Adequate for Discharge  4/6/2023 1119 by Yauqelin Campbell RN  Outcome: Progressing     Problem: Prexisting or High Potential for Compromised Skin Integrity  Goal: Skin integrity is maintained or improved  Description: INTERVENTIONS:  - Identify patients at risk for skin breakdown  - Assess and monitor skin integrity  - Assess and monitor nutrition and hydration status  - Monitor labs   - Assess for incontinence   - Turn and reposition patient  - Assist with mobility/ambulation  - Relieve pressure over bony prominences  - Avoid friction and shearing  - Provide appropriate hygiene as needed including keeping skin clean and dry  - Evaluate need for skin moisturizer/barrier cream  - Collaborate with interdisciplinary team   - Patient/family teaching  - Consider wound care consult   4/6/2023 1151 by Noble Kirkpatrick RN  Outcome: Adequate for Discharge  4/6/2023 1151 by Noble Kirkpatrick RN  Outcome: Adequate for Discharge  4/6/2023 1119 by Noble Kirkpatrick RN  Outcome: Progressing     Problem: MOBILITY - ADULT  Goal: Maintain or return to baseline ADL function  Description: INTERVENTIONS:  -  Assess patient's ability to carry out ADLs; assess patient's baseline for ADL function and identify physical deficits which impact ability to perform ADLs (bathing, care of mouth/teeth, toileting, grooming, dressing, etc )  - Assess/evaluate cause of self-care deficits   - Assess range of motion  - Assess patient's mobility; develop plan if impaired  - Assess patient's need for assistive devices and provide as appropriate  - Encourage maximum independence but intervene and supervise when necessary  - Involve family in performance of ADLs  - Assess for home care needs following discharge   - Consider OT consult to assist with ADL evaluation and planning for discharge  - Provide patient education as appropriate  4/6/2023 1151 by Noble Kirkpatrick RN  Outcome: Adequate for Discharge  4/6/2023 1151 by Noble Kirkpatrick RN  Outcome: Adequate for Discharge  4/6/2023 1119 by Noble Kirkpatrick RN  Outcome: Progressing  Goal: Maintains/Returns to pre admission functional level  Description: INTERVENTIONS:  - Perform BMAT or MOVE assessment daily    - Set and communicate daily mobility goal to care team and patient/family/caregiver  - Collaborate with rehabilitation services on mobility goals if consulted  - Perform Range of Motion x times a day  - Reposition patient every x hours    - Dangle patient x times a day  - Stand patient x times a day  - Ambulate patient x times a day  - Out of bed to chair x times a day   - Out of bed for meals x times a day  - Out of bed for toileting  - Record patient progress and toleration of activity level   4/6/2023 1151 by Hector Andrews RN  Outcome: Adequate for Discharge  4/6/2023 1151 by Hector Andrews RN  Outcome: Adequate for Discharge  4/6/2023 1119 by Hector Andrews RN  Outcome: Progressing     Problem: PAIN - ADULT  Goal: Verbalizes/displays adequate comfort level or baseline comfort level  Description: Interventions:  - Encourage patient to monitor pain and request assistance  - Assess pain using appropriate pain scale  - Administer analgesics based on type and severity of pain and evaluate response  - Implement non-pharmacological measures as appropriate and evaluate response  - Consider cultural and social influences on pain and pain management  - Notify physician/advanced practitioner if interventions unsuccessful or patient reports new pain  4/6/2023 1151 by Hector Andrews RN  Outcome: Adequate for Discharge  4/6/2023 1151 by Hector Andrews RN  Outcome: Adequate for Discharge  4/6/2023 1119 by Hector Andrews RN  Outcome: Progressing     Problem: INFECTION - ADULT  Goal: Absence or prevention of progression during hospitalization  Description: INTERVENTIONS:  - Assess and monitor for signs and symptoms of infection  - Monitor lab/diagnostic results  - Monitor all insertion sites, i e  indwelling lines, tubes, and drains  - Monitor endotracheal if appropriate and nasal secretions for changes in amount and color  - [place] appropriate cooling/warming therapies per order  - Administer medications as ordered  - Instruct and encourage patient and family to use good hand hygiene technique  - Identify and instruct in appropriate isolation precautions for identified infection/condition  4/6/2023 1151 by Hector Andrews RN  Outcome: Adequate for Discharge  4/6/2023 1151 by Hector Andrews RN  Outcome: Adequate for Discharge  4/6/2023 1119 by Coretta James RAMAKRISHNA Taylor  Outcome: Progressing  Goal: Absence of fever/infection during neutropenic period  Description: INTERVENTIONS:  - Monitor WBC    4/6/2023 1151 by Savanna Burks RN  Outcome: Adequate for Discharge  4/6/2023 1151 by Savanna Burks RN  Outcome: Adequate for Discharge  4/6/2023 1119 by Savanna Burks RN  Outcome: Progressing     Problem: DISCHARGE PLANNING  Goal: Discharge to home or other facility with appropriate resources  Description: INTERVENTIONS:  - Identify barriers to discharge w/patient and caregiver  - Arrange for needed discharge resources and transportation as appropriate  - Identify discharge learning needs (meds, wound care, etc )  - Arrange for interpretive services to assist at discharge as needed  - Refer to Case Management Department for coordinating discharge planning if the patient needs post-hospital services based on physician/advanced practitioner order or complex needs related to functional status, cognitive ability, or social support system  4/6/2023 1151 by Savanna Burks RN  Outcome: Adequate for Discharge  4/6/2023 1151 by Savanna Burks RN  Outcome: Adequate for Discharge  4/6/2023 1119 by Savanna Burks RN  Outcome: Progressing     Problem: Knowledge Deficit  Goal: Patient/family/caregiver demonstrates understanding of disease process, treatment plan, medications, and discharge instructions  Description: Complete learning assessment and assess knowledge base    Interventions:  - Provide teaching at level of understanding  - Provide teaching via preferred learning methods  4/6/2023 1151 by Savanna Burks RN  Outcome: Adequate for Discharge  4/6/2023 1151 by Savanna Burks RN  Outcome: Adequate for Discharge  4/6/2023 1119 by Savanna Burks RN  Outcome: Progressing     Problem: NEUROSENSORY - ADULT  Goal: Achieves stable or improved neurological status  Description: INTERVENTIONS  - Monitor and report changes in neurological status  - Monitor vital signs such as temperature, blood pressure, glucose, and any other labs ordered   - Initiate measures to prevent increased intracranial pressure  - Monitor for seizure activity and implement precautions if appropriate      4/6/2023 1151 by Savanna Burks RN  Outcome: Adequate for Discharge  4/6/2023 1151 by Savanna Burks RN  Outcome: Adequate for Discharge  4/6/2023 1119 by Savanna Burks RN  Outcome: Progressing  Goal: Remains free of injury related to seizures activity  Description: INTERVENTIONS  - Maintain airway, patient safety  and administer oxygen as ordered  - Monitor patient for seizure activity, document and report duration and description of seizure to physician/advanced practitioner  - If seizure occurs,  ensure patient safety during seizure  - Reorient patient post seizure  - Seizure pads on all 4 side rails  - Instruct patient/family to notify RN of any seizure activity including if an aura is experienced  - Instruct patient/family to call for assistance with activity based on nursing assessment  - Administer anti-seizure medications if ordered    4/6/2023 1151 by Savanna Burks RN  Outcome: Adequate for Discharge  4/6/2023 1151 by Savanna Burks RN  Outcome: Adequate for Discharge  4/6/2023 1119 by Savanna Burks RN  Outcome: Progressing  Goal: Achieves maximal functionality and self care  Description: INTERVENTIONS  - Monitor swallowing and airway patency with patient fatigue and changes in neurological status  - Encourage and assist patient to increase activity and self care     - Encourage visually impaired, hearing impaired and aphasic patients to use assistive/communication devices  4/6/2023 1151 by Savanna Burks RN  Outcome: Adequate for Discharge  4/6/2023 1151 by Savanna Burks RN  Outcome: Adequate for Discharge  4/6/2023 1119 by Savanna Burks RN  Outcome: Progressing     Problem: RESPIRATORY - ADULT  Goal: Achieves optimal ventilation and oxygenation  Description: INTERVENTIONS:  - Assess for changes in respiratory status  - Assess for changes in mentation and behavior  - Position to facilitate oxygenation and minimize respiratory effort  - Oxygen administered by appropriate delivery if ordered  - Initiate smoking cessation education as indicated  - Encourage broncho-pulmonary hygiene including cough, deep breathe, Incentive Spirometry  - Assess the need for suctioning and aspirate as needed  - Assess and instruct to report SOB or any respiratory difficulty  - Respiratory Therapy support as indicated  4/6/2023 1151 by Cynthia Mehta RN  Outcome: Adequate for Discharge  4/6/2023 1151 by Cynthia Mehta RN  Outcome: Adequate for Discharge  4/6/2023 1119 by Cynthia Mehta RN  Outcome: Progressing     Problem: SKIN/TISSUE INTEGRITY - ADULT  Goal: Skin Integrity remains intact(Skin Breakdown Prevention)  Description: Assess:  -Perform Sergei assessment every x  -Clean and moisturize skin every x  -Inspect skin when repositioning, toileting, and assisting with ADLS  -Assess under medical devices such as x every x  -Assess extremities for adequate circulation and sensation     Bed Management:  -Have minimal linens on bed & keep smooth, unwrinkled  -Change linens as needed when moist or perspiring  -Avoid sitting or lying in one position for more than x hours while in bed  -Keep HOB at [facility]     Toileting:  -Offer bedside commode  -Assess for incontinence every x  -Use incontinent care products after each incontinent episode such as x    Activity:  -Mobilize patient x times a day  -Encourage activity and walks on unit  -Encourage or provide ROM exercises   -Turn and reposition patient every x Hours  -Use appropriate equipment to lift or move patient in bed  -Instruct/ Assist with weight shifting every x when out of bed in chair  -Consider limitation of chair time x hour intervals    Skin Care:  -Avoid use of baby powder, tape, friction and shearing, hot water or constrictive clothing  -Relieve pressure over bony prominences using x  -Do not massage red bony areas    Next Steps:  -Teach patient strategies to minimize risks such as x   -Consider consults to  interdisciplinary teams such as x  4/6/2023 1151 by Kym Rojas RN  Outcome: Adequate for Discharge  4/6/2023 1151 by Kym Rojas RN  Outcome: Adequate for Discharge  4/6/2023 1119 by Kym Rojas RN  Outcome: Progressing  Goal: Incision(s), wounds(s) or drain site(s) healing without S/S of infection  Description: INTERVENTIONS  - Assess and document dressing, incision, wound bed, drain sites and surrounding tissue  - Provide patient and family education  - Perform skin care/dressing changes every x  4/6/2023 1151 by Kym Rojas RN  Outcome: Adequate for Discharge  4/6/2023 1151 by Kym Rojas RN  Outcome: Adequate for Discharge  4/6/2023 1119 by Kym Rojas RN  Outcome: Progressing  Goal: Pressure injury heals and does not worsen  Description: Interventions:  - Implement low air loss mattress or specialty surface (Criteria met)  - Apply silicone foam dressing  - Instruct/assist with weight shifting every x minutes when in chair   - Limit chair time to x hour intervals  - Use special pressure reducing interventions such as x when in chair   - Apply fecal or urinary incontinence containment device   - Perform passive or active ROM every x  - Turn and reposition patient & offload bony prominences every x hours   - Utilize friction reducing device or surface for transfers   - Consider consults to  interdisciplinary teams such as x  - Use incontinent care products after each incontinent episode such as x  - Consider nutrition services referral as needed  4/6/2023 1151 by Kym Rojas RN  Outcome: Adequate for Discharge  4/6/2023 1151 by Kym Rojas RN  Outcome: Adequate for Discharge  4/6/2023 1119 by Kym Rojas RN  Outcome: Progressing Problem: MUSCULOSKELETAL - ADULT  Goal: Maintain or return mobility to safest level of function  Description: INTERVENTIONS:  - Assess patient's ability to carry out ADLs; assess patient's baseline for ADL function and identify physical deficits which impact ability to perform ADLs (bathing, care of mouth/teeth, toileting, grooming, dressing, etc )  - Assess/evaluate cause of self-care deficits   - Assess range of motion  - Assess patient's mobility  - Assess patient's need for assistive devices and provide as appropriate  - Encourage maximum independence but intervene and supervise when necessary  - Involve family in performance of ADLs  - Assess for home care needs following discharge   - Consider OT consult to assist with ADL evaluation and planning for discharge  - Provide patient education as appropriate  4/6/2023 1151 by Yanci Palacios RN  Outcome: Adequate for Discharge  4/6/2023 1151 by Yanci Palacios RN  Outcome: Adequate for Discharge  4/6/2023 1119 by Yanci Palacios RN  Outcome: Progressing  Goal: Maintain proper alignment of affected body part  Description: INTERVENTIONS:  - Support, maintain and protect limb and body alignment  - Provide patient/ family with appropriate education  4/6/2023 1151 by Yanci Palacios RN  Outcome: Adequate for Discharge  4/6/2023 1151 by Yanci Palacios RN  Outcome: Adequate for Discharge  4/6/2023 1119 by Yanci Palacios RN  Outcome: Progressing     Problem: HEMATOLOGIC - ADULT  Goal: Maintains hematologic stability  Description: INTERVENTIONS  - Assess for signs and symptoms of bleeding or hemorrhage  - Monitor labs  - Administer supportive blood products/factors as ordered and appropriate  4/6/2023 1151 by Yanci Palacios RN  Outcome: Adequate for Discharge  4/6/2023 1151 by Yanci Palacios RN  Outcome: Adequate for Discharge  4/6/2023 1119 by Yanci Palacios RN  Outcome: Progressing     Problem: Potential for Falls  Goal: Patient will remain free of falls  Description: INTERVENTIONS:  - Educate patient/family on patient safety including physical limitations  - Instruct patient to call for assistance with activity   - Consult OT/PT to assist with strengthening/mobility   - Keep Call bell within reach  - Keep bed low and locked with side rails adjusted as appropriate  - Keep care items and personal belongings within reach  - Initiate and maintain comfort rounds  - Make Fall Risk Sign visible to staff  - Offer Toileting every x Hours, in advance of need  - Initiate/Maintain xalarm  - Obtain necessary fall risk management equipment: x  - Apply yellow socks and bracelet for high fall risk patients  - Consider moving patient to room near nurses station  4/6/2023 1151 by Rigoberto Fajardo RN  Outcome: Adequate for Discharge  4/6/2023 1151 by Rigoberto Fajardo RN  Outcome: Adequate for Discharge  4/6/2023 1119 by Rigoberto Fajardo RN  Outcome: Progressing     Problem: SAFETY ADULT  Goal: Patient will remain free of falls  Description: INTERVENTIONS:  - Educate patient/family on patient safety including physical limitations  - Instruct patient to call for assistance with activity   - Consult OT/PT to assist with strengthening/mobility   - Keep Call bell within reach  - Keep bed low and locked with side rails adjusted as appropriate  - Keep care items and personal belongings within reach  - Initiate and maintain comfort rounds  - Make Fall Risk Sign visible to staff  - Offer Toileting every x Hours, in advance of need  - Initiate/Maintain xalarm  - Obtain necessary fall risk management equipment: xxxxx  - Apply yellow socks and bracelet for high fall risk patients  - Consider moving patient to room near nurses station  4/6/2023 1151 by Rigoberto Fajardo RN  Outcome: Adequate for Discharge  4/6/2023 1151 by Rigoberto Fajardo RN  Outcome: Adequate for Discharge  4/6/2023 1119 by Rigoberto Fajardo RN  Outcome: Progressing

## 2023-04-06 NOTE — PLAN OF CARE
Problem: Nutrition/Hydration-ADULT  Goal: Nutrient/Hydration intake appropriate for improving, restoring or maintaining nutritional needs  Description: Monitor and assess patient's nutrition/hydration status for malnutrition  Collaborate with interdisciplinary team and initiate plan and interventions as ordered  Monitor patient's weight and dietary intake as ordered or per policy  Utilize nutrition screening tool and intervene as necessary  Determine patient's food preferences and provide high-protein, high-caloric foods as appropriate       INTERVENTIONS:  - Monitor oral intake, urinary output, labs, and treatment plans  - Assess nutrition and hydration status and recommend course of action  - Evaluate amount of meals eaten  - Assist patient with eating if necessary   - Allow adequate time for meals  - Recommend/ encourage appropriate diets, oral nutritional supplements, and vitamin/mineral supplements  - Order, calculate, and assess calorie counts as needed  - Recommend, monitor, and adjust tube feedings and TPN/PPN based on assessed needs  - Assess need for intravenous fluids  - Provide specific nutrition/hydration education as appropriate  - Include patient/family/caregiver in decisions related to nutrition  Outcome: Progressing     Problem: Prexisting or High Potential for Compromised Skin Integrity  Goal: Skin integrity is maintained or improved  Description: INTERVENTIONS:  - Identify patients at risk for skin breakdown  - Assess and monitor skin integrity  - Assess and monitor nutrition and hydration status  - Monitor labs   - Assess for incontinence   - Turn and reposition patient  - Assist with mobility/ambulation  - Relieve pressure over bony prominences  - Avoid friction and shearing  - Provide appropriate hygiene as needed including keeping skin clean and dry  - Evaluate need for skin moisturizer/barrier cream  - Collaborate with interdisciplinary team   - Patient/family teaching  - Consider wound care consult   Outcome: Progressing     Problem: MOBILITY - ADULT  Goal: Maintain or return to baseline ADL function  Description: INTERVENTIONS:  -  Assess patient's ability to carry out ADLs; assess patient's baseline for ADL function and identify physical deficits which impact ability to perform ADLs (bathing, care of mouth/teeth, toileting, grooming, dressing, etc )  - Assess/evaluate cause of self-care deficits   - Assess range of motion  - Assess patient's mobility; develop plan if impaired  - Assess patient's need for assistive devices and provide as appropriate  - Encourage maximum independence but intervene and supervise when necessary  - Involve family in performance of ADLs  - Assess for home care needs following discharge   - Consider OT consult to assist with ADL evaluation and planning for discharge  - Provide patient education as appropriate  Outcome: Progressing     Problem: PAIN - ADULT  Goal: Verbalizes/displays adequate comfort level or baseline comfort level  Description: Interventions:  - Encourage patient to monitor pain and request assistance  - Assess pain using appropriate pain scale  - Administer analgesics based on type and severity of pain and evaluate response  - Implement non-pharmacological measures as appropriate and evaluate response  - Consider cultural and social influences on pain and pain management  - Notify physician/advanced practitioner if interventions unsuccessful or patient reports new pain  Outcome: Progressing     Problem: INFECTION - ADULT  Goal: Absence or prevention of progression during hospitalization  Description: INTERVENTIONS:  - Assess and monitor for signs and symptoms of infection  - Monitor lab/diagnostic results  - Monitor all insertion sites, i e  indwelling lines, tubes, and drains  - Monitor endotracheal if appropriate and nasal secretions for changes in amount and color  - [place] appropriate cooling/warming therapies per order  - Administer medications as ordered  - Instruct and encourage patient and family to use good hand hygiene technique  - Identify and instruct in appropriate isolation precautions for identified infection/condition  Outcome: Progressing  Goal: Absence of fever/infection during neutropenic period  Description: INTERVENTIONS:  - Monitor WBC    Outcome: Progressing     Problem: DISCHARGE PLANNING  Goal: Discharge to home or other facility with appropriate resources  Description: INTERVENTIONS:  - Identify barriers to discharge w/patient and caregiver  - Arrange for needed discharge resources and transportation as appropriate  - Identify discharge learning needs (meds, wound care, etc )  - Arrange for interpretive services to assist at discharge as needed  - Refer to Case Management Department for coordinating discharge planning if the patient needs post-hospital services based on physician/advanced practitioner order or complex needs related to functional status, cognitive ability, or social support system  Outcome: Progressing     Problem: Knowledge Deficit  Goal: Patient/family/caregiver demonstrates understanding of disease process, treatment plan, medications, and discharge instructions  Description: Complete learning assessment and assess knowledge base    Interventions:  - Provide teaching at level of understanding  - Provide teaching via preferred learning methods  Outcome: Progressing     Problem: NEUROSENSORY - ADULT  Goal: Achieves stable or improved neurological status  Description: INTERVENTIONS  - Monitor and report changes in neurological status  - Monitor vital signs such as temperature, blood pressure, glucose, and any other labs ordered   - Initiate measures to prevent increased intracranial pressure  - Monitor for seizure activity and implement precautions if appropriate      Outcome: Progressing  Goal: Remains free of injury related to seizures activity  Description: INTERVENTIONS  - Maintain airway, patient safety and administer oxygen as ordered  - Monitor patient for seizure activity, document and report duration and description of seizure to physician/advanced practitioner  - If seizure occurs,  ensure patient safety during seizure  - Reorient patient post seizure  - Seizure pads on all 4 side rails  - Instruct patient/family to notify RN of any seizure activity including if an aura is experienced  - Instruct patient/family to call for assistance with activity based on nursing assessment  - Administer anti-seizure medications if ordered    Outcome: Progressing  Goal: Achieves maximal functionality and self care  Description: INTERVENTIONS  - Monitor swallowing and airway patency with patient fatigue and changes in neurological status  - Encourage and assist patient to increase activity and self care     - Encourage visually impaired, hearing impaired and aphasic patients to use assistive/communication devices  Outcome: Progressing     Problem: RESPIRATORY - ADULT  Goal: Achieves optimal ventilation and oxygenation  Description: INTERVENTIONS:  - Assess for changes in respiratory status  - Assess for changes in mentation and behavior  - Position to facilitate oxygenation and minimize respiratory effort  - Oxygen administered by appropriate delivery if ordered  - Initiate smoking cessation education as indicated  - Encourage broncho-pulmonary hygiene including cough, deep breathe, Incentive Spirometry  - Assess the need for suctioning and aspirate as needed  - Assess and instruct to report SOB or any respiratory difficulty  - Respiratory Therapy support as indicated  Outcome: Progressing     Problem: MUSCULOSKELETAL - ADULT  Goal: Maintain or return mobility to safest level of function  Description: INTERVENTIONS:  - Assess patient's ability to carry out ADLs; assess patient's baseline for ADL function and identify physical deficits which impact ability to perform ADLs (bathing, care of mouth/teeth, toileting, grooming, dressing, etc )  - Assess/evaluate cause of self-care deficits   - Assess range of motion  - Assess patient's mobility  - Assess patient's need for assistive devices and provide as appropriate  - Encourage maximum independence but intervene and supervise when necessary  - Involve family in performance of ADLs  - Assess for home care needs following discharge   - Consider OT consult to assist with ADL evaluation and planning for discharge  - Provide patient education as appropriate  Outcome: Progressing  Goal: Maintain proper alignment of affected body part  Description: INTERVENTIONS:  - Support, maintain and protect limb and body alignment  - Provide patient/ family with appropriate education  Outcome: Progressing

## 2023-04-06 NOTE — ASSESSMENT & PLAN NOTE
· History of Smith's esophagus with GERD  · Recently started PPI outpatient  · Start Protonix 40 Mg in morning

## 2023-04-07 ENCOUNTER — TELEPHONE (OUTPATIENT)
Dept: OBGYN CLINIC | Facility: HOSPITAL | Age: 78
End: 2023-04-07

## 2023-04-07 NOTE — TELEPHONE ENCOUNTER
"Pt contacted this morning, we completed a postoperative follow up call assessment  He reports being \"stiff\" and \"no pain\" with sitting/rest  He reports noticing right knee swelling, which we discussed postoperative swelling for the first 2-3 weeks, worsening after activity  We discussed icing areas of pain and swelling, and his dressing is removed, open to air and dry with no drainage  We reviewed his AVS medication list  He insists \"I am taking all medications he prescribed  \" I attempted to confirm the following:   ASA 81mg BID   Tylenol 1000mg TID   Durcief BID   Zofran 4mg PRN   Oxycodone 5mg \"trying to stretch\" frequency  He continues to say \"All medications he prescribed  \"    He denies chest pain, SOB, dizziness, fever, calf pain or addtl symptoms  pt encouraged to call me with questions, concerns or issues       "

## 2023-04-08 NOTE — TELEPHONE ENCOUNTER
Caller: Patient- Clearance Kian    Doctor: Dr Jean Paul Jacobs    Reason for call: Patient is calling in stating that he had surgery on 4/5/23 to his right hip and is having right knee swelling almost 2 times the size of his left one and is asking if this is normal or not? He was also told that he needed to ice the area but is not sure as to where exactly he should be icing  Information forwarded over to on call  to assist further       Call back#: [phone]

## 2023-04-13 PROBLEM — M25.551 RIGHT HIP PAIN: Status: RESOLVED | Noted: 2019-10-21 | Resolved: 2023-04-13

## 2023-04-13 PROBLEM — Z12.2 SCREENING FOR LUNG CANCER: Status: RESOLVED | Noted: 2019-05-09 | Resolved: 2023-04-13

## 2023-04-13 PROBLEM — Z96.641 HISTORY OF RIGHT HIP REPLACEMENT: Status: ACTIVE | Noted: 2023-04-13

## 2023-04-24 ENCOUNTER — OFFICE VISIT (OUTPATIENT)
Dept: PHYSICAL THERAPY | Facility: REHABILITATION | Age: 78
End: 2023-04-24

## 2023-04-24 DIAGNOSIS — M16.11 PRIMARY OSTEOARTHRITIS OF ONE HIP, RIGHT: Primary | ICD-10-CM

## 2023-04-24 DIAGNOSIS — Z47.1 AFTERCARE FOLLOWING RIGHT HIP JOINT REPLACEMENT SURGERY: ICD-10-CM

## 2023-04-24 DIAGNOSIS — Z96.641 AFTERCARE FOLLOWING RIGHT HIP JOINT REPLACEMENT SURGERY: ICD-10-CM

## 2023-04-24 NOTE — PROGRESS NOTES
"Daily Note     Today's date: 2023  Patient name: Olamide Menendez  : 1945  MRN: [id number]  Referring provider: Misa Croft PA-C  Dx:   Encounter Diagnosis     ICD-10-CM    1  Primary osteoarthritis of one hip, right  M16 11       2  Aftercare following right hip joint replacement surgery  Z47 1     Z96 [id number]                      Subjective: Pt comes to therapy denying pain or discomfort  Denies discomfort following last treatment session  States he has been able to get by using Tylenol for pain relief now  Continues to ambulate with RW  Drove himself to therapy today  Objective: See treatment diary below      Assessment: Tolerated treatment well  Discomfort noted in medial R knee with passive hip ER  Patient demonstrated fatigue post treatment, exhibited good technique with therapeutic exercises and would benefit from continued PT      Plan: Progress treatment as tolerated         Precautions: COPD, Hx Prostate cancer, hiatal hernia, former smoker      3/21 4/10 4/11 4/17 4/20 4/24       FOTO  nv           IE/RE Pre-OP IE LILIANA             Manuals             R hip PROM  LILIANA TE LILIANA LILIANA LILIANA                                              Neuro Re-Ed             Hip add iso  5\"x10 x10 ea b/l 5\" 2x10 np 5\" 2x10       Hip abd iso    Purple 5\" 2x10 np        Quad set   5\"x10 5\"x10          Glute Set    5\"x10                                                 Ther Ex             Mini squats     2x10 2x10       HR/TR   2x10 ea 2x10 ea 2x10 ea x30 ea       Stand hip abd, ext   x10 ea b/l 2x10 ea b/l 2x10 ea b/l 2# 2x10 ea b/l       Stand march   x10 b/l 2x10 ea b/l 2x10 ea b/l 2# 2x10 ea b/l       Stand knee flex      2# 2x10 ea b/l       Heel slide - flex, abd  5\"x10 x10 ea b/l To march  2x10 ea np                     SAQ  5\"x10 5\"x10 2x10 b/l np 2# 2x10       LAQ    2x10 b/l 2x10 b/l 2# 2x10 ea b/l       Bridges       2x10                                 Ther Activity             Bike    5 min 5 min 6 min 6 min " Step ups    0R x10 1HHA Held-pain                     Gait Training             With RW              With [facility]             Modalities             CP

## 2023-04-26 ENCOUNTER — OFFICE VISIT (OUTPATIENT)
Dept: PHYSICAL THERAPY | Facility: REHABILITATION | Age: 78
End: 2023-04-26

## 2023-04-26 DIAGNOSIS — Z47.1 AFTERCARE FOLLOWING RIGHT HIP JOINT REPLACEMENT SURGERY: ICD-10-CM

## 2023-04-26 DIAGNOSIS — Z96.641 AFTERCARE FOLLOWING RIGHT HIP JOINT REPLACEMENT SURGERY: ICD-10-CM

## 2023-04-26 DIAGNOSIS — M16.11 PRIMARY OSTEOARTHRITIS OF ONE HIP, RIGHT: Primary | ICD-10-CM

## 2023-04-26 NOTE — PROGRESS NOTES
"Daily Note     Today's date: 2023  Patient name: Geoffrey Fletcher  : 1945  MRN: [id number]  Referring provider: Belem Castro PA-C  Dx:   Encounter Diagnosis     ICD-10-CM    1  Primary osteoarthritis of one hip, right  M16 11       2  Aftercare following right hip joint replacement surgery  Z47 1     Z96 [id number]                      Subjective: pt offered no significant complaints  Denied adverse reactions following last visit  Objective: See treatment diary below      Assessment: Tolerated treatment well  Good response with exercises with no adverse reactions  Patient demonstrated fatigue post treatment, exhibited good technique with therapeutic exercises and would benefit from continued PT      Plan: Continue per plan of care  Progress treatment as tolerated         Precautions: COPD, Hx Prostate cancer, hiatal hernia, former smoker      3/21 4/10 4/11 4/17 4/20 4/24 4/26      FOTO  nv           IE/RE Pre-OP IE LILIANA             Manuals             R hip PROM  LILIANA TE LILIANA LILIANA LILIANA TE                                             Neuro Re-Ed             Hip add iso  5\"x10 x10 ea b/l 5\" 2x10 np 5\" 2x10 5\" 2x10      Hip abd iso    Purple 5\" 2x10 np        Quad set   5\"x10 5\"x10          Glute Set    5\"x10                                                 Ther Ex             Mini squats     2x10 2x10 2x10      HR/TR   2x10 ea 2x10 ea 2x10 ea x30 ea x30 ea      Stand hip abd, ext   x10 ea b/l 2x10 ea b/l 2x10 ea b/l 2# 2x10 ea b/l 2# 2x10 ea b/l      Stand march   x10 b/l 2x10 ea b/l 2x10 ea b/l 2# 2x10 ea b/l 2# 2x10 ea b/l      Stand knee flex      2# 2x10 ea b/l 2# 2x10 ea b/l      Heel slide - flex, abd  5\"x10 x10 ea b/l To march  2x10 ea np                     SAQ  5\"x10 5\"x10 2x10 b/l np 2# 2x10 2# 2x10      LAQ    2x10 b/l 2x10 b/l 2# 2x10 ea b/l 2# 2x10 ea b/l      Bridges       2x10 5\" 2x10                                Ther Activity             Bike    5 min 5 min 6 min 6 min 6 min      Step ups    0R " x10 1HHA Held-pain                     Gait Training             With RW              With [facility]             CP

## 2023-05-01 ENCOUNTER — OFFICE VISIT (OUTPATIENT)
Dept: PHYSICAL THERAPY | Facility: REHABILITATION | Age: 78
End: 2023-05-01

## 2023-05-01 DIAGNOSIS — Z47.1 AFTERCARE FOLLOWING RIGHT HIP JOINT REPLACEMENT SURGERY: ICD-10-CM

## 2023-05-01 DIAGNOSIS — Z96.641 AFTERCARE FOLLOWING RIGHT HIP JOINT REPLACEMENT SURGERY: ICD-10-CM

## 2023-05-01 DIAGNOSIS — M16.11 PRIMARY OSTEOARTHRITIS OF ONE HIP, RIGHT: Primary | ICD-10-CM

## 2023-05-01 NOTE — PROGRESS NOTES
"Daily Note     Today's date: 2023  Patient name: Salena Macdonald  : 1945  MRN: [id number]  Referring provider: Bonifacio Muñoz PA-C  Dx:   Encounter Diagnosis     ICD-10-CM    1  Primary osteoarthritis of one hip, right  M16 11       2  Aftercare following right hip joint replacement surgery  Z47 1     Z96 [id number]                      Subjective: pt reports he didn't take any pain medication this a m  for the first time prior to therapy session  Objective: See treatment diary below      Assessment: Tolerated treatment well  Good response with exercises and manuals  Gait training performed with SPC with good carry over  Pt exhibits good safety and confidence with SPC and instructed to discharge RW at present time  Patient demonstrated fatigue post treatment, exhibited good technique with therapeutic exercises and would benefit from continued PT      Plan: Continue per plan of care  Progress treatment as tolerated         Precautions: COPD, Hx Prostate cancer, hiatal hernia, former smoker      3/21 4/10 4/11 4/17 4/20 4/24 4/26 5/1     FOTO  nv           IE/RE Pre-OP IE LILIANA             Manuals             R hip PROM  LILIANA TE LILIANA LILIANA LILIANA TE TE                                            Neuro Re-Ed             Hip add iso  5\"x10 x10 ea b/l 5\" 2x10 np 5\" 2x10 5\" 2x10 5\" 2x10     Hip abd iso    Purple 5\" 2x10 np        Quad set   5\"x10 5\"x10          Glute Set    5\"x10                                                 Ther Ex             Mini squats     2x10 2x10 2x10 2x10     HR/TR   2x10 ea 2x10 ea 2x10 ea x30 ea x30 ea x30 ea     Stand hip abd, ext   x10 ea b/l 2x10 ea b/l 2x10 ea b/l 2# 2x10 ea b/l 2# 2x10 ea b/l 2# 2x10 ea b/l     Stand march   x10 b/l 2x10 ea b/l 2x10 ea b/l 2# 2x10 ea b/l 2# 2x10 ea b/l 2# 2x10 ea b/l     Stand knee flex      2# 2x10 ea b/l 2# 2x10 ea b/l 2# 2x10 ea b/l     Heel slide - flex, abd  5\"x10 x10 ea b/l To march  2x10 ea np                     SAQ  5\"x10 5\"x10 2x10 b/l np 2# " "2x10 2# 2x10 2# 2x10 3#    LAQ    2x10 b/l 2x10 b/l 2# 2x10 ea b/l 2# 2x10 ea b/l 2# 2x10 3#    Bridges       2x10 5\" 2x10 5\" 2x10                               Ther Activity             Bike    5 min 5 min 6 min 6 min 6 min 6 min     Step ups    0R x10 1HHA Held-pain   nv                  Gait Training             With RW              With [facility]             Modalities             CP                               "

## 2023-05-03 ENCOUNTER — OFFICE VISIT (OUTPATIENT)
Dept: PHYSICAL THERAPY | Facility: REHABILITATION | Age: 78
End: 2023-05-03

## 2023-05-03 DIAGNOSIS — M16.11 PRIMARY OSTEOARTHRITIS OF ONE HIP, RIGHT: Primary | ICD-10-CM

## 2023-05-03 DIAGNOSIS — Z96.641 AFTERCARE FOLLOWING RIGHT HIP JOINT REPLACEMENT SURGERY: ICD-10-CM

## 2023-05-03 DIAGNOSIS — Z47.1 AFTERCARE FOLLOWING RIGHT HIP JOINT REPLACEMENT SURGERY: ICD-10-CM

## 2023-05-03 NOTE — PROGRESS NOTES
"Daily Note     Today's date: 5/3/2023  Patient name: Severo Ridges  : 1945  MRN: [id number]  Referring provider: Gama Toth PA-C  Dx:   Encounter Diagnosis     ICD-10-CM    1  Primary osteoarthritis of one hip, right  M16 11       2  Aftercare following right hip joint replacement surgery  Z47 1     Z96 [id number]                      Subjective: Pt comes to therapy reporting soreness in his anterior thigh this morning upon waking up  Otherwise, denies notable complaints lately  Ambulates with SPC  Objective: See treatment diary below      Assessment: Tolerated treatment well  Patient exhibited good technique with therapeutic exercises and would benefit from continued PT      Plan: Progress treatment as tolerated         Precautions: COPD, Hx Prostate cancer, hiatal hernia, former smoker      3/21 4/10 4/11 4/17 4/20 4/24 4/26 5/1 5/3    FOTO  nv           IE/RE Pre-OP IE LILIANA             Manuals             R hip PROM  LILIANA TE LILIANA LILIANA LILIANA TE TE                                            Neuro Re-Ed             H/L hip add iso  5\"x10 x10 ea b/l 5\" 2x10 np 5\" 2x10 5\" 2x10 5\" 2x10 5\" 2x10    H/L hip abd clam    Purple 5\" 2x10 np    Purple 5\" 2x10    Quad set   5\"x10 5\"x10          Glute Set    5\"x10          Tandem balance         nv                              Ther Ex             Mini squats     2x10 2x10 2x10 2x10 2x10    HR/TR   2x10 ea 2x10 ea 2x10 ea x30 ea x30 ea x30 ea x30 ea    Stand hip abd, ext   x10 ea b/l 2x10 ea b/l 2x10 ea b/l 2# 2x10 ea b/l 2# 2x10 ea b/l 2# 2x10 ea b/l 3# 2x10 ea b/l    Stand march   x10 b/l 2x10 ea b/l 2x10 ea b/l 2# 2x10 ea b/l 2# 2x10 ea b/l 2# 2x10 ea b/l 3# 2x10 ea b/l    Stand knee flex      2# 2x10 ea b/l 2# 2x10 ea b/l 2# 2x10 ea b/l 3# 2x10 ea b/l    Heel slide - flex, abd  5\"x10 x10 ea b/l To march  2x10 ea np                     SAQ  5\"x10 5\"x10 2x10 b/l np 2# 2x10 2# 2x10 2# 2x10 3# 2x10    LAQ    2x10 b/l 2x10 b/l 2# 2x10 2# 2x10 2# 2x10 3# 2x10    Bridges       " "2x10 5\" 2x10 5\" 2x10 5\" 2x10                              Ther Activity             Bike    5 min 5 min 6 min 6 min 6 min 6 min 10 min    Step ups    0R x10 1HHA Held-pain   nv 1R HHA x10    Side stepping         nv                              Gait Training             With RW              With [address]             Modalities             CP                               "

## 2023-05-08 ENCOUNTER — OFFICE VISIT (OUTPATIENT)
Dept: PHYSICAL THERAPY | Facility: REHABILITATION | Age: 78
End: 2023-05-08

## 2023-05-08 DIAGNOSIS — Z96.641 AFTERCARE FOLLOWING RIGHT HIP JOINT REPLACEMENT SURGERY: ICD-10-CM

## 2023-05-08 DIAGNOSIS — M16.11 PRIMARY OSTEOARTHRITIS OF ONE HIP, RIGHT: Primary | ICD-10-CM

## 2023-05-08 DIAGNOSIS — Z47.1 AFTERCARE FOLLOWING RIGHT HIP JOINT REPLACEMENT SURGERY: ICD-10-CM

## 2023-05-08 NOTE — PROGRESS NOTES
"Daily Note     Today's date: 2023  Patient name: Soraya Desir  : 1945  MRN: [id number]  Referring provider: Nanda Nascimento PA-C  Dx:   Encounter Diagnosis     ICD-10-CM    1  Primary osteoarthritis of one hip, right  M16 11       2  Aftercare following right hip joint replacement surgery  Z47 1     Z96 [id number]                      Subjective: Pt comes to therapy ambulating with SPC  States this morning was the first time he was able to bend down in order to don a sock  Objective: See treatment diary below      Assessment: Tolerated treatment well  Patient exhibited good technique with therapeutic exercises and would benefit from continued PT      Plan: Progress treatment as tolerated         Precautions: COPD, Hx Prostate cancer, hiatal hernia, former smoker      3/21 4/10 4/11 4/17 4/20 4/24 4/26 5/1 5/3 5/8   FOTO  nv           IE/RE Pre-OP IE LILIANA             Manuals             R hip PROM  LILIANA TE LILIANA LILIANA LILIANA TE TE                                            Neuro Re-Ed             H/L hip add iso  5\"x10 x10 ea b/l 5\" 2x10 np 5\" 2x10 5\" 2x10 5\" 2x10 5\" 2x10 10\"x10   H/L hip abd clam    Purple 5\" 2x10 np    Purple 5\" 2x10 Purple 10\"x10   Quad set   5\"x10 5\"x10          Glute Set    5\"x10          Tandem balance         nv nv                             Ther Ex             Mini squats     2x10 2x10 2x10 2x10 2x10 3x10   HR/TR   2x10 ea 2x10 ea 2x10 ea x30 ea x30 ea x30 ea x30 ea D/C   Stand hip abd, ext   x10 ea b/l 2x10 ea b/l 2x10 ea b/l 2# 2x10 ea b/l 2# 2x10 ea b/l 2# 2x10 ea b/l 3# 2x10 ea b/l 3# 2x10 ea b/l   Stand march   x10 b/l 2x10 ea b/l 2x10 ea b/l 2# 2x10 ea b/l 2# 2x10 ea b/l 2# 2x10 ea b/l 3# 2x10 ea b/l 3# 2x10 ea b/l   Stand knee flex      2# 2x10 ea b/l 2# 2x10 ea b/l 2# 2x10 ea b/l 3# 2x10 ea b/l 3# 2x10 ea b/l   Heel slide - flex, abd  5\"x10 x10 ea b/l To march  2x10 ea np                     SAQ  5\"x10 5\"x10 2x10 b/l np 2# 2x10 2# 2x10 2# 2x10 3# 2x10 3# 2x10   LAQ    2x10 b/l " "2x10 b/l 2# 2x10 2# 2x10 2# 2x10 3# 2x10 3# 2x10   Bridges       2x10 5\" 2x10 5\" 2x10 5\" 2x10 5\" 2x10                             Ther Activity             Bike    5 min 5 min 6 min 6 min 6 min 6 min 10 min 10 min   Step ups    0R x10 1HHA Held-pain   nv 1R HHA x10 1R HHA fwd, lat x10 ea   Side stepping         nv nv                             Gait Training             With RW              With [facility]             Modalities             CP                               "

## 2023-05-10 ENCOUNTER — OFFICE VISIT (OUTPATIENT)
Dept: PHYSICAL THERAPY | Facility: REHABILITATION | Age: 78
End: 2023-05-10

## 2023-05-10 DIAGNOSIS — M16.11 PRIMARY OSTEOARTHRITIS OF ONE HIP, RIGHT: Primary | ICD-10-CM

## 2023-05-10 DIAGNOSIS — Z96.641 AFTERCARE FOLLOWING RIGHT HIP JOINT REPLACEMENT SURGERY: ICD-10-CM

## 2023-05-10 DIAGNOSIS — Z47.1 AFTERCARE FOLLOWING RIGHT HIP JOINT REPLACEMENT SURGERY: ICD-10-CM

## 2023-05-10 NOTE — PROGRESS NOTES
"Daily Note     Today's date: 5/10/2023  Patient name: Shaunna Seo  : 1945  MRN: [id number]  Referring provider: Francine Ruiz PA-C  Dx:   Encounter Diagnosis     ICD-10-CM    1  Primary osteoarthritis of one hip, right  M16 11       2  Aftercare following right hip joint replacement surgery  Z47 1     Z96 641                      Subjective: Pt comes to therapy denying pain or discomfort  Denies discomfort following last treatment session  Objective: See treatment diary below      Assessment: Tolerated treatment well  Patient exhibited good technique with therapeutic exercises and would benefit from continued PT      Plan: Progress treatment as tolerated         Precautions: COPD, Hx Prostate cancer, hiatal hernia, former smoker      5/10  4/11 4/17 4/20 4/24 4/26 5/1 5/3 5/8   FOTO             IE/RE               Manuals             R hip PROM LILIANA  TE LILIANA LILIANA LILIANA TE TE Karyn Ast                                          Neuro Re-Ed             H/L hip add iso   x10 ea b/l 5\" 2x10 np 5\" 2x10 5\" 2x10 5\" 2x10 5\" 2x10 10\"x10   H/L hip abd clam Purple 5\"x20   Purple 5\" 2x10 np    Purple 5\" 2x10 Purple 10\"x10   Quad set    5\"x10          Glute Set    5\"x10          Tandem balance nv        nv nv                             Ther Ex             Mini squats 3x10    2x10 2x10 2x10 2x10 2x10 3x10   HR/TR   2x10 ea 2x10 ea 2x10 ea x30 ea x30 ea x30 ea x30 ea D/C   Stand hip abd, ext 3# 2x10 ea b/l  x10 ea b/l 2x10 ea b/l 2x10 ea b/l 2# 2x10 ea b/l 2# 2x10 ea b/l 2# 2x10 ea b/l 3# 2x10 ea b/l 3# 2x10 ea b/l   Stand march 3# 2x10 ea b/l  x10 b/l 2x10 ea b/l 2x10 ea b/l 2# 2x10 ea b/l 2# 2x10 ea b/l 2# 2x10 ea b/l 3# 2x10 ea b/l 3# 2x10 ea b/l   Stand knee flex 3# 2x10 ea b/l     2# 2x10 ea b/l 2# 2x10 ea b/l 2# 2x10 ea b/l 3# 2x10 ea b/l 3# 2x10 ea b/l   Heel slide - flex, abd   x10 ea b/l To march  2x10 ea np                     SAQ 3# 2x10  5\"x10 2x10 b/l np 2# 2x10 2# 2x10 2# 2x10 3# 2x10 3# 2x10   LAQ 3# 2x10   " "2x10 b/l 2x10 b/l 2# 2x10 2# 2x10 2# 2x10 3# 2x10 3# 2x10   Bridges  Purple 5\" 2x10     2x10 5\" 2x10 5\" 2x10 5\" 2x10 5\" 2x10                             Ther Activity             Bike  10 min  5 min 5 min 6 min 6 min 6 min 6 min 10 min 10 min   Step ups np   0R x10 1HHA Held-pain   nv 1R HHA x10 1R HHA fwd, lat x10 ea   Side stepping nv        nv nv                             Gait Training                                       Modalities             CP                               "

## 2023-05-15 ENCOUNTER — OFFICE VISIT (OUTPATIENT)
Dept: PHYSICAL THERAPY | Facility: REHABILITATION | Age: 78
End: 2023-05-15

## 2023-05-15 DIAGNOSIS — M16.11 PRIMARY OSTEOARTHRITIS OF ONE HIP, RIGHT: Primary | ICD-10-CM

## 2023-05-15 DIAGNOSIS — Z47.1 AFTERCARE FOLLOWING RIGHT HIP JOINT REPLACEMENT SURGERY: ICD-10-CM

## 2023-05-15 DIAGNOSIS — Z96.641 AFTERCARE FOLLOWING RIGHT HIP JOINT REPLACEMENT SURGERY: ICD-10-CM

## 2023-05-15 NOTE — PROGRESS NOTES
"Daily Note     Today's date: 5/15/2023  Patient name: Elisabet Weaver  : 1945  MRN: [id number]  Referring provider: Elisa Martinez PA-C  Dx:   Encounter Diagnosis     ICD-10-CM    1  Primary osteoarthritis of one hip, right  M16 11       2  Aftercare following right hip joint replacement surgery  Z47 1     Z96 [id number]                      Subjective: pt reports his hip has been feeling pretty good  Denied adverse reaction following last visit  Objective: See treatment diary below      Assessment: Tolerated treatment well  Good tolerance with progressions with no adverse reactions  Patient demonstrated fatigue post treatment, exhibited good technique with therapeutic exercises and would benefit from continued PT      Plan: Continue per plan of care  Progress treatment as tolerated         Precautions: COPD, Hx Prostate cancer, hiatal hernia, former smoker      5/10 5/15  4/17 4/20 4/24 4/26 5/1 5/3 5/8   FOTO             IE/RE               Manuals             R hip PROM LILIANA TE  LILIANA LILIANA LILIANA TE TE LILIANA LILIANA                                          Neuro Re-Ed             H/L hip add iso    5\" 2x10 np 5\" 2x10 5\" 2x10 5\" 2x10 5\" 2x10 10\"x10   H/L hip abd clam Purple 5\"x20 nv  Purple 5\" 2x10 np    Purple 5\" 2x10 Purple 10\"x10   Quad set              Glute Set              Tandem balance nv nv       nv nv                             Ther Ex             Mini squats 3x10 3x10   2x10 2x10 2x10 2x10 2x10 3x10   HR/TR    2x10 ea 2x10 ea x30 ea x30 ea x30 ea x30 ea D/C   Stand hip abd, ext 3# 2x10 ea b/l 5# 2x10 b/l  2x10 ea b/l 2x10 ea b/l 2# 2x10 ea b/l 2# 2x10 ea b/l 2# 2x10 ea b/l 3# 2x10 ea b/l 3# 2x10 ea b/l   Stand march 3# 2x10 ea b/l 5# 2x10 b/l  2x10 ea b/l 2x10 ea b/l 2# 2x10 ea b/l 2# 2x10 ea b/l 2# 2x10 ea b/l 3# 2x10 ea b/l 3# 2x10 ea b/l   Stand knee flex 3# 2x10 ea b/l 5# 2x10 b/l    2# 2x10 ea b/l 2# 2x10 ea b/l 2# 2x10 ea b/l 3# 2x10 ea b/l 3# 2x10 ea b/l   Heel slide - flex, abd    To march  2x10 ea np " "                    SAQ 3# 2x10 5# 2x10  2x10 b/l np 2# 2x10 2# 2x10 2# 2x10 3# 2x10 3# 2x10   LAQ 3# 2x10 5# 2x10  2x10 b/l 2x10 b/l 2# 2x10 2# 2x10 2# 2x10 3# 2x10 3# 2x10   Bridges  Purple 5\" 2x10 nv    2x10 5\" 2x10 5\" 2x10 5\" 2x10 5\" 2x10                             Ther Activity             Bike  10 min 10 min 5 min 5 min 6 min 6 min 6 min 6 min 10 min 10 min   Step ups np   0R x10 1HHA Held-pain   nv 1R HHA x10 1R HHA fwd, lat x10 ea   Side stepping nv nv       nv nv                             Gait Training                                       Modalities             CP                               "

## 2023-05-16 ENCOUNTER — OFFICE VISIT (OUTPATIENT)
Dept: OBGYN CLINIC | Facility: MEDICAL CENTER | Age: 78
End: 2023-05-16

## 2023-05-16 VITALS
BODY MASS INDEX: 18.04 KG/M2 | SYSTOLIC BLOOD PRESSURE: 124 MMHG | HEIGHT: 68 IN | HEART RATE: 78 BPM | DIASTOLIC BLOOD PRESSURE: 71 MMHG | WEIGHT: 119 LBS

## 2023-05-16 DIAGNOSIS — Z47.1 AFTERCARE FOLLOWING RIGHT HIP JOINT REPLACEMENT SURGERY: Primary | ICD-10-CM

## 2023-05-16 DIAGNOSIS — Z96.641 AFTERCARE FOLLOWING RIGHT HIP JOINT REPLACEMENT SURGERY: Primary | ICD-10-CM

## 2023-05-16 RX ORDER — CLINDAMYCIN HYDROCHLORIDE 300 MG/1
600 CAPSULE ORAL
Qty: 2 CAPSULE | Refills: 2 | Status: SHIPPED | OUTPATIENT
Start: 2023-05-16 | End: 2023-05-17

## 2023-05-16 NOTE — PROGRESS NOTES
Subjective:Patient seen and examined  Pain controlled- states he takes tylenol as needed  He is ambulating with a cane  Progressing well  Incision without drainage   Denies fevers or chills    Physical Exam:  Incision: CDI, no erythema or drainage  ROM:normal post op motion  5/5 IP/Q/HS/TA/GS, 2+ DP/PT, SILT DP/SP/S/S/TN    XR no new imaging was obtained today     Assessment/Plan:  75 y/o male doing very well 6 weeks s/p Right MANDO from 4/5/23    - continue multi-modal pain control   - Weight bearing status: WBAT  - DVT ppx: aspirin 81mg twice daily- completed  - Incision care: No restrictions   - Clindamycin 600mg was placed for future dental procedure- wait additional 6 weeks before any dental work  - PT/OT  - F/U 6 weeks    Scribe Attestation    I,:  Rei Arvizu am acting as a scribe while in the presence of the attending physician :       I,:  Raffy Mora, DO personally performed the services described in this documentation    as scribed in my presence :

## 2023-05-17 ENCOUNTER — OFFICE VISIT (OUTPATIENT)
Dept: PHYSICAL THERAPY | Facility: REHABILITATION | Age: 78
End: 2023-05-17

## 2023-05-17 DIAGNOSIS — Z96.641 AFTERCARE FOLLOWING RIGHT HIP JOINT REPLACEMENT SURGERY: ICD-10-CM

## 2023-05-17 DIAGNOSIS — Z47.1 AFTERCARE FOLLOWING RIGHT HIP JOINT REPLACEMENT SURGERY: ICD-10-CM

## 2023-05-17 DIAGNOSIS — M16.11 PRIMARY OSTEOARTHRITIS OF ONE HIP, RIGHT: Primary | ICD-10-CM

## 2023-05-17 NOTE — PROGRESS NOTES
"Daily Note     Today's date: 2023  Patient name: Deana Nguyen  : 1945  MRN: [id number]  Referring provider: Erick Caballero PA-C  Dx:   Encounter Diagnosis     ICD-10-CM    1  Primary osteoarthritis of one hip, right  M16 11       2  Aftercare following right hip joint replacement surgery  Z47 1     Z96 641                      Subjective: Pt comes to therapy stating he had a follow up with the orthopedic surgeon recently, noting he was cleared of any restrictions and is to follow up again in 6 weeks  Ambulates into clinic with [facility]  Objective: See treatment diary below      Assessment: Tolerated treatment well  Patient demonstrated fatigue post treatment, exhibited good technique with therapeutic exercises and would benefit from continued PT      Plan: Progress treatment as tolerated         Precautions: COPD, Hx Prostate cancer, hiatal hernia, former smoker      5/10 5/15 5/17   4/24 4/26 5/1 5/3 5/8   FOTO             IE/RE               Manuals             R hip PROM LILIANA TE LILIANA   LILIANA TE TE LILIANA LILIANA                                          Neuro Re-Ed             H/L hip add iso      5\" 2x10 5\" 2x10 5\" 2x10 5\" 2x10 10\"x10   H/L hip abd clam Purple 5\"x20 nv D/C      Purple 5\" 2x10 Purple 10\"x10   Tandem balance nv nv 30\"x2 b/l      nv nv                             Ther Ex             Mini squats 3x10 3x10 3x10   2x10 2x10 2x10 2x10 3x10   HR/TR      x30 ea x30 ea x30 ea x30 ea D/C   Stand hip abd, ext 3# 2x10 ea b/l 5# 2x10 b/l 5# 3x10 b/l   2# 2x10 ea b/l 2# 2x10 ea b/l 2# 2x10 ea b/l 3# 2x10 ea b/l 3# 2x10 ea b/l   Stand hip flex   5# 2x10 b/l          Stand march 3# 2x10 ea b/l 5# 2x10 b/l np   2# 2x10 ea b/l 2# 2x10 ea b/l 2# 2x10 ea b/l 3# 2x10 ea b/l 3# 2x10 ea b/l   Stand knee flex 3# 2x10 ea b/l 5# 2x10 b/l 5# 3x10 b/l   2# 2x10 ea b/l 2# 2x10 ea b/l 2# 2x10 ea b/l 3# 2x10 ea b/l 3# 2x10 ea b/l   SA leg curl   22# 2x10          SA leg ext   22# 2x10                       SAQ 3# 2x10 5# " "2x10    2# 2x10 2# 2x10 2# 2x10 3# 2x10 3# 2x10   LAQ 3# 2x10 5# 2x10 D/C   2# 2x10 2# 2x10 2# 2x10 3# 2x10 3# 2x10   Bridges  Purple 5\" 2x10 nv Purple 5\" 2x10   2x10 5\" 2x10 5\" 2x10 5\" 2x10 5\" 2x10   SLR - flex, abd   nv                       Ther Activity             Bike  10 min 10 min 10 min   6 min 6 min 6 min 10 min 10 min   Step ups np       nv 1R HHA x10 1R HHA fwd, lat x10 ea   Side stepping nv nv Green 10ft x3 laps      nv nv                             Gait Training                                       Modalities             CP                               "

## 2023-05-22 ENCOUNTER — OFFICE VISIT (OUTPATIENT)
Dept: PHYSICAL THERAPY | Facility: REHABILITATION | Age: 78
End: 2023-05-22

## 2023-05-22 DIAGNOSIS — M16.11 PRIMARY OSTEOARTHRITIS OF ONE HIP, RIGHT: Primary | ICD-10-CM

## 2023-05-22 DIAGNOSIS — Z47.1 AFTERCARE FOLLOWING RIGHT HIP JOINT REPLACEMENT SURGERY: ICD-10-CM

## 2023-05-22 DIAGNOSIS — Z96.641 AFTERCARE FOLLOWING RIGHT HIP JOINT REPLACEMENT SURGERY: ICD-10-CM

## 2023-05-22 NOTE — PROGRESS NOTES
"Daily Note     Today's date: 2023  Patient name: Praful Ramos  : 1945  MRN: [id number]  Referring provider: David Stern PA-C  Dx:   Encounter Diagnosis     ICD-10-CM    1  Primary osteoarthritis of one hip, right  M16 11       2  Aftercare following right hip joint replacement surgery  Z47 1     Z96 641                      Subjective: Pt comes to therapy denying changes since last visit  Denies notable soreness  Ambulates into clinic without assistive device  Objective: See treatment diary below      Assessment: Tolerated treatment well  Pt demonstrated good SLS with minimal assist needed  Denied pain or discomfort throughout session or with manuals  Continued slowed gait and short stride  Patient demonstrated fatigue post treatment, exhibited good technique with therapeutic exercises and would benefit from continued PT      Plan: Progress treatment as tolerated         Precautions: COPD, Hx Prostate cancer, hiatal hernia, former smoker      5/10 5/15 5/17 5/22  4/24 4/26 5/1 5/3 5/8   FOTO             IE/RE               Manuals             R hip PROM LILIANA TE LILIANA LILIANA  LILIANA TE TE LILIANA LILIANA                                          Neuro Re-Ed             H/L hip add iso      5\" 2x10 5\" 2x10 5\" 2x10 5\" 2x10 10\"x10   H/L hip abd clam Purple 5\"x20 nv D/C      Purple 5\" 2x10 Purple 10\"x10   Tandem balance nv nv 30\"x2 b/l np     nv nv   SLS    Floor 30\"x3                      Ther Ex             Mini squats 3x10 3x10 3x10 3x10  2x10 2x10 2x10 2x10 3x10   HR/TR      x30 ea x30 ea x30 ea x30 ea D/C   Stand hip abd, ext 3# 2x10 ea b/l 5# 2x10 b/l 5# 3x10 b/l 5# 3x10 b/l  2# 2x10 ea b/l 2# 2x10 ea b/l 2# 2x10 ea b/l 3# 2x10 ea b/l 3# 2x10 ea b/l   Stand hip flex   5# 2x10 b/l 5# 3x10 b/l         Stand march 3# 2x10 ea b/l 5# 2x10 b/l np 5# 3x10 b/l  2# 2x10 ea b/l 2# 2x10 ea b/l 2# 2x10 ea b/l 3# 2x10 ea b/l 3# 2x10 ea b/l   Stand knee flex 3# 2x10 ea b/l 5# 2x10 b/l 5# 3x10 b/l 5# 3x10 b/l  2# 2x10 ea b/l " "2# 2x10 ea b/l 2# 2x10 ea b/l 3# 2x10 ea b/l 3# 2x10 ea b/l   SA leg curl   22# 2x10 22# 2x10         SA leg ext   22# 2x10 22# 2x10                      SAQ 3# 2x10 5# 2x10    2# 2x10 2# 2x10 2# 2x10 3# 2x10 3# 2x10   LAQ 3# 2x10 5# 2x10 D/C   2# 2x10 2# 2x10 2# 2x10 3# 2x10 3# 2x10   Bridges  Purple 5\" 2x10 nv Purple 5\" 2x10 Purple 5\" x30  2x10 5\" 2x10 5\" 2x10 5\" 2x10 5\" 2x10   SLR - flex, abd   nv 5\"x10 ea                      Ther Activity             Bike  10 min 10 min 10 min 10 min  6 min 6 min 6 min 10 min 10 min   Step ups np       nv 1R HHA x10 1R HHA fwd, lat x10 ea   Side stepping nv nv Green 10ft x3 laps missed     nv nv                             Gait Training                                       Modalities             CP                               "

## 2023-05-24 ENCOUNTER — OFFICE VISIT (OUTPATIENT)
Dept: PHYSICAL THERAPY | Facility: REHABILITATION | Age: 78
End: 2023-05-24

## 2023-05-24 DIAGNOSIS — Z96.641 AFTERCARE FOLLOWING RIGHT HIP JOINT REPLACEMENT SURGERY: ICD-10-CM

## 2023-05-24 DIAGNOSIS — Z47.1 AFTERCARE FOLLOWING RIGHT HIP JOINT REPLACEMENT SURGERY: ICD-10-CM

## 2023-05-24 DIAGNOSIS — M16.11 PRIMARY OSTEOARTHRITIS OF ONE HIP, RIGHT: Primary | ICD-10-CM

## 2023-05-24 NOTE — PROGRESS NOTES
"Daily Note     Today's date: 2023  Patient name: Ayesha Burkitt  : 1945  MRN: [id number]  Referring provider: Austyn Waters PA-C  Dx:   Encounter Diagnosis     ICD-10-CM    1  Primary osteoarthritis of one hip, right  M16 11       2  Aftercare following right hip joint replacement surgery  Z47 1     Z96 641                      Subjective: pt reports with c/o increased hip pain prior to beginning today  He denied change in activity over the past few days  Objective: See treatment diary below      Assessment: Tolerated treatment well  Pt exhibited good tolerance with exercises with no adverse reactions  He appeared to be experiencing SOB this visit, therefore, all exercises NP bPatient demonstrated fatigue post treatment, exhibited good technique with therapeutic exercises and would benefit from continued PT      Plan: Continue per plan of care  Progress treatment as tolerated         Precautions: COPD, Hx Prostate cancer, hiatal hernia, former smoker      5/10 5/15 5/17 5/22 5/24  4/26 5/1 5/3 5/8   FOTO             IE/RE               Manuals             R hip PROM LILIANA TE LILIANA LILIANA TE   TE LILIANA LILIANA                                          Neuro Re-Ed             H/L hip add iso       5\" 2x10 5\" 2x10 5\" 2x10 10\"x10   H/L hip abd clam Purple 5\"x20 nv D/C      Purple 5\" 2x10 Purple 10\"x10   Tandem balance nv nv 30\"x2 b/l np nv    nv nv   SLS    Floor 30\"x3 nv                     Ther Ex             Mini squats 3x10 3x10 3x10 3x10 3x10  2x10 2x10 2x10 3x10   HR/TR       x30 ea x30 ea x30 ea D/C   Stand hip abd, ext 3# 2x10 ea b/l 5# 2x10 b/l 5# 3x10 b/l 5# 3x10 b/l 5# 3x10 b/l  2# 2x10 ea b/l 2# 2x10 ea b/l 3# 2x10 ea b/l 3# 2x10 ea b/l   Stand hip flex   5# 2x10 b/l 5# 3x10 b/l 5# 3x10 b/l        Stand march 3# 2x10 ea b/l 5# 2x10 b/l np 5# 3x10 b/l 5# 3x10 b/l  2# 2x10 ea b/l 2# 2x10 ea b/l 3# 2x10 ea b/l 3# 2x10 ea b/l   Stand knee flex 3# 2x10 ea b/l 5# 2x10 b/l 5# 3x10 b/l 5# 3x10 b/l 5# 3x10 b/l  " "2# 2x10 ea b/l 2# 2x10 ea b/l 3# 2x10 ea b/l 3# 2x10 ea b/l   SA leg curl   22# 2x10 22# 2x10 22# 2x10        SA leg ext   22# 2x10 22# 2x10 22# 2x10                     SAQ 3# 2x10 5# 2x10     2# 2x10 2# 2x10 3# 2x10 3# 2x10   LAQ 3# 2x10 5# 2x10 D/C    2# 2x10 2# 2x10 3# 2x10 3# 2x10   Bridges  Purple 5\" 2x10 nv Purple 5\" 2x10 Purple 5\" x30 5\"x20  5\" 2x10 5\" 2x10 5\" 2x10 5\" 2x10   SLR - flex, abd   nv 5\"x10 ea 5\"x10 ea                     Ther Activity             Bike  10 min 10 min 10 min 10 min 5 min  6 min 6 min 10 min 10 min   Step ups np       nv 1R HHA x10 1R HHA fwd, lat x10 ea   Side stepping nv nv Green 10ft x3 laps missed nv    nv nv                             Gait Training                                       Modalities             CP                               "

## 2023-05-30 ENCOUNTER — OFFICE VISIT (OUTPATIENT)
Dept: PHYSICAL THERAPY | Facility: REHABILITATION | Age: 78
End: 2023-05-30

## 2023-05-30 DIAGNOSIS — Z96.641 AFTERCARE FOLLOWING RIGHT HIP JOINT REPLACEMENT SURGERY: ICD-10-CM

## 2023-05-30 DIAGNOSIS — Z47.1 AFTERCARE FOLLOWING RIGHT HIP JOINT REPLACEMENT SURGERY: ICD-10-CM

## 2023-05-30 DIAGNOSIS — M16.11 PRIMARY OSTEOARTHRITIS OF ONE HIP, RIGHT: Primary | ICD-10-CM

## 2023-05-30 NOTE — PROGRESS NOTES
"Daily Note     Today's date: 2023  Patient name: Tawnya Bernheim  : 1945  MRN: [id number]  Referring provider: Isaiah Romero PA-C  Dx:   Encounter Diagnosis     ICD-10-CM    1  Primary osteoarthritis of one hip, right  M16 11       2  Aftercare following right hip joint replacement surgery  Z47 1     Z96 641                      Subjective: Patient denies any issues, offers no new complaints  Objective: See treatment diary below      Assessment: Tolerated treatment well  Slight quad lag as he fatigues  He is appropriately challenged with outlined reps and resistance, cues to control ecc portions  Good balance with SLS and tandem stance, utilizing finger tip assist only a few times, consider foam NV  Patient demonstrated fatigue post treatment and would benefit from continued PT      Plan: Continue per plan of care        Precautions: COPD, Hx Prostate cancer, hiatal hernia, former smoker      5/10 5/15 5/17 5/22 5/24 5/30  5/1 5/3 5/8   FOTO             IE/RE               Manuals             R hip PROM LILIANA TE LILIANA LILIANA TE IVA  TE LILIANA LILIANA                                          Neuro Re-Ed             H/L hip add iso       5\" 2x10 5\" 2x10 5\" 2x10 10\"x10   H/L hip abd clam Purple 5\"x20 nv D/C      Purple 5\" 2x10 Purple 10\"x10   Tandem balance nv nv 30\"x2 b/l np nv 30\"x2 b/l    nv nv   SLS    Floor 30\"x3 nv Floor 30\"x3                    Ther Ex             Mini squats 3x10 3x10 3x10 3x10 3x10 3x10   2x10 2x10 2x10 3x10   HR/TR       x30 ea x30 ea x30 ea D/C   Stand hip abd, ext 3# 2x10 ea b/l 5# 2x10 b/l 5# 3x10 b/l 5# 3x10 b/l 5# 3x10 b/l 5# 3x10 b/l 2# 2x10 ea b/l 2# 2x10 ea b/l 3# 2x10 ea b/l 3# 2x10 ea b/l   Stand hip flex   5# 2x10 b/l 5# 3x10 b/l 5# 3x10 b/l 5# 3x10 b/l       Stand march 3# 2x10 ea b/l 5# 2x10 b/l np 5# 3x10 b/l 5# 3x10 b/l 5# 3x10 b/l 2# 2x10 ea b/l 2# 2x10 ea b/l 3# 2x10 ea b/l 3# 2x10 ea b/l   Stand knee flex 3# 2x10 ea b/l 5# 2x10 b/l 5# 3x10 b/l 5# 3x10 b/l 5# 3x10 b/l 5# " "3x10 b/l 2# 2x10 ea b/l 2# 2x10 ea b/l 3# 2x10 ea b/l 3# 2x10 ea b/l   SA leg curl   22# 2x10 22# 2x10 22# 2x10 22#2x10        SA leg ext   22# 2x10 22# 2x10 22# 2x10 22# 2x10                    SAQ 3# 2x10 5# 2x10     2# 2x10 2# 2x10 3# 2x10 3# 2x10   LAQ 3# 2x10 5# 2x10 D/C    2# 2x10 2# 2x10 3# 2x10 3# 2x10   Bridges  Purple 5\" 2x10 nv Purple 5\" 2x10 Purple 5\" x30 5\"x20 NV 5\" 2x10 5\" 2x10 5\" 2x10 5\" 2x10   SLR - flex, abd   nv 5\"x10 ea 5\"x10 ea 5\"x10 ea                    Ther Activity             Bike  10 min 10 min 10 min 10 min 5 min 6 min  6 min 6 min 10 min 10 min   Step ups np       nv 1R HHA x10 1R HHA fwd, lat x10 ea   Side stepping nv nv Green 10ft x3 laps missed nv NV   nv nv                             Gait Training                                       Modalities             CP                               "

## 2023-06-01 ENCOUNTER — OFFICE VISIT (OUTPATIENT)
Dept: PHYSICAL THERAPY | Facility: REHABILITATION | Age: 78
End: 2023-06-01

## 2023-06-01 DIAGNOSIS — Z96.641 AFTERCARE FOLLOWING RIGHT HIP JOINT REPLACEMENT SURGERY: ICD-10-CM

## 2023-06-01 DIAGNOSIS — Z47.1 AFTERCARE FOLLOWING RIGHT HIP JOINT REPLACEMENT SURGERY: ICD-10-CM

## 2023-06-01 DIAGNOSIS — M16.11 PRIMARY OSTEOARTHRITIS OF ONE HIP, RIGHT: Primary | ICD-10-CM

## 2023-06-01 NOTE — PROGRESS NOTES
"Daily Note     Today's date: 2023  Patient name: Tawnya Bernheim  : 1945  MRN: [id number]  Referring provider: Isaiah Romero PA-C  Dx:   Encounter Diagnosis     ICD-10-CM    1  Primary osteoarthritis of one hip, right  M16 11       2  Aftercare following right hip joint replacement surgery  Z47 1     Z96 641                      Subjective: pt reports with c/o increased hip pain  He noted lifting a heavy garbage can 2 days and has been experiencing groin pain which as remained persistent in nature  He noted taking Tylenol prior to therapy with little relief  Objective: See treatment diary below      Assessment: Tolerated treatment well  Modified treatment 2* increased hip pain; reduced dosage and held exercises as lsited below  Positive response with PROM and exercises with reports of decreased pain upon completion  Patient demonstrated fatigue post treatment, exhibited good technique with therapeutic exercises and would benefit from continued PT      Plan: Continue per plan of care  Progress treatment as tolerated         Precautions: COPD, Hx Prostate cancer, hiatal hernia, former smoker      5/10 5/15 5/17 5/22 5/24 5/30 6/1  5/3 5/8   FOTO             IE/RE               Manuals             R hip PROM LILIANA TE LILIANA LILIANA TE IVA TE  LILIANA LILIANA                                          Neuro Re-Ed             H/L hip add iso         5\" 2x10 10\"x10   H/L hip abd clam Purple 5\"x20 nv D/C      Purple 5\" 2x10 Purple 10\"x10   Tandem balance nv nv 30\"x2 b/l np nv 30\"x2 b/l  nv  nv nv   SLS    Floor 30\"x3 nv Floor 30\"x3 nv                   Ther Ex             Mini squats 3x10 3x10 3x10 3x10 3x10 3x10   3x10  2x10 3x10   HR/TR         x30 ea D/C   Stand hip abd, ext 3# 2x10 ea b/l 5# 2x10 b/l 5# 3x10 b/l 5# 3x10 b/l 5# 3x10 b/l 5# 3x10 b/l 5# 2x10 b/l  3# 2x10 ea b/l 3# 2x10 ea b/l   Stand hip flex   5# 2x10 b/l 5# 3x10 b/l 5# 3x10 b/l 5# 3x10 b/l 5# 2x10 b/l      Stand march 3# 2x10 ea b/l 5# 2x10 b/l np 5# 3x10 " "b/l 5# 3x10 b/l 5# 3x10 b/l   3# 2x10 ea b/l 3# 2x10 ea b/l   Stand knee flex 3# 2x10 ea b/l 5# 2x10 b/l 5# 3x10 b/l 5# 3x10 b/l 5# 3x10 b/l 5# 3x10 b/l 5# 2x10 b/l  3# 2x10 ea b/l 3# 2x10 ea b/l   SA leg curl   22# 2x10 22# 2x10 22# 2x10 22#2x10  nv      SA leg ext   22# 2x10 22# 2x10 22# 2x10 22# 2x10 nv                   SAQ 3# 2x10 5# 2x10      2# 2x10 3# 2x10 3# 2x10   LAQ 3# 2x10 5# 2x10 D/C     2# 2x10 3# 2x10 3# 2x10   Bridges  Purple 5\" 2x10 nv Purple 5\" 2x10 Purple 5\" x30 5\"x20 NV 5\" 2x10 5\" 2x10 5\" 2x10 5\" 2x10   SLR - flex, abd   nv 5\"x10 ea 5\"x10 ea 5\"x10 ea 5\"x10 ea                   Ther Activity             Bike  10 min 10 min 10 min 10 min 5 min 6 min  10 min 6 min 10 min 10 min   Step ups np       nv 1R HHA x10 1R HHA fwd, lat x10 ea   Side stepping nv nv Green 10ft x3 laps missed nv NV   nv nv                             Gait Training                                       Modalities             CP                               "

## 2023-06-06 ENCOUNTER — OFFICE VISIT (OUTPATIENT)
Dept: PHYSICAL THERAPY | Facility: REHABILITATION | Age: 78
End: 2023-06-06
Payer: COMMERCIAL

## 2023-06-06 DIAGNOSIS — M16.11 PRIMARY OSTEOARTHRITIS OF ONE HIP, RIGHT: Primary | ICD-10-CM

## 2023-06-06 DIAGNOSIS — Z96.641 AFTERCARE FOLLOWING RIGHT HIP JOINT REPLACEMENT SURGERY: ICD-10-CM

## 2023-06-06 DIAGNOSIS — Z47.1 AFTERCARE FOLLOWING RIGHT HIP JOINT REPLACEMENT SURGERY: ICD-10-CM

## 2023-06-06 PROCEDURE — 97140 MANUAL THERAPY 1/> REGIONS: CPT | Performed by: PHYSICAL THERAPIST

## 2023-06-06 PROCEDURE — 97112 NEUROMUSCULAR REEDUCATION: CPT | Performed by: PHYSICAL THERAPIST

## 2023-06-06 PROCEDURE — 97110 THERAPEUTIC EXERCISES: CPT | Performed by: PHYSICAL THERAPIST

## 2023-06-06 NOTE — PROGRESS NOTES
"Daily Note     Today's date: 2023  Patient name: Ian Carrillo  : 1945  MRN: [id number]  Referring provider: Jorje Baer PA-C  Dx:   Encounter Diagnosis     ICD-10-CM    1  Primary osteoarthritis of one hip, right  M16 11       2  Aftercare following right hip joint replacement surgery  Z47 1     Z96 641           Start Time: 734  Stop Time: 820  Total time in clinic (min): 46 minutes    Subjective: Pt comes to therapy with no complaints  States that the pain he felt prior to last session from lifting a heavy garbage bin has continuously been decreasing  Reports he still feels some discomfort but it hasn't hindered his ADLs over the past few days  Objective: See treatment diary below      Assessment: Patient tolerated treatment well  Added new exercises into training regimen, specifically step ups, SLS, and tandem stance  Increased resistance was also added with leg ext and curls, along with side stepping and bridges, responded well with good challenge and no discomfort  Patient was educated that increased resistance could produce mild soreness for the next 24hrs  Patient demonstrated fatigue post treatment, exhibited good technique with therapeutic exercises and would benefit from continued PT      Plan: Continue per plan of care        Precautions: COPD, Hx Prostate cancer, hiatal hernia, former smoker      5/10 5/15 5/17 5/22 5/24 5/30 6/1 6/6     FOTO             IE/RE               Manuals             R hip PROM LILIANA TE LILIANA LILIANA TE IVA TE TG                                            Neuro Re-Ed             H/L hip add iso             H/L hip abd clam Purple 5\"x20 nv D/C          Tandem balance nv nv 30\"x2 b/l np nv 30\"x2 b/l  nv EC 30\" x2 b/l floor     SLS    Floor 30\"x3 nv Floor 30\"x3 nv 30\" x2 b/l  floor                  Ther Ex             Mini squats 3x10 3x10 3x10 3x10 3x10 3x10   3x10 3x10     HR/TR             Stand hip abd, ext 3# 2x10 ea b/l 5# 2x10 b/l 5# 3x10 b/l 5# 3x10 " "b/l 5# 3x10 b/l 5# 3x10 b/l 5# 2x10 b/l      Stand hip flex   5# 2x10 b/l 5# 3x10 b/l 5# 3x10 b/l 5# 3x10 b/l 5# 2x10 b/l      Stand march 3# 2x10 ea b/l 5# 2x10 b/l np 5# 3x10 b/l 5# 3x10 b/l 5# 3x10 b/l       Stand knee flex 3# 2x10 ea b/l 5# 2x10 b/l 5# 3x10 b/l 5# 3x10 b/l 5# 3x10 b/l 5# 3x10 b/l 5# 2x10 b/l      SA leg curl   22# 2x10 22# 2x10 22# 2x10 22#2x10  nv 33# 2x10     SA leg ext   22# 2x10 22# 2x10 22# 2x10 22# 2x10 nv 33# 2x10                  SAQ 3# 2x10 5# 2x10           LAQ 3# 2x10 5# 2x10 D/C          Bridges  Purple 5\" 2x10 nv Purple 5\" 2x10 Purple 5\" x30 5\"x20 NV 5\" 2x10 GTB 5\" x10     SLR - flex, abd   nv 5\"x10 ea 5\"x10 ea 5\"x10 ea 5\"x10 ea                   Ther Activity             Bike  10 min 10 min 10 min 10 min 5 min 6 min  10 min 10 min     Step ups np       1R 2x10 b/l     Side stepping nv nv Green 10ft x3 laps missed nv NV  RTB 3 laps GTB nv                              Gait Training                                       Modalities             CP                                Patient supervised through session by ROXANNA Gaspar, with direct supervision by SMITA BelcherT     "

## 2023-06-08 ENCOUNTER — OFFICE VISIT (OUTPATIENT)
Dept: PHYSICAL THERAPY | Facility: REHABILITATION | Age: 78
End: 2023-06-08
Payer: COMMERCIAL

## 2023-06-08 DIAGNOSIS — Z96.641 AFTERCARE FOLLOWING RIGHT HIP JOINT REPLACEMENT SURGERY: ICD-10-CM

## 2023-06-08 DIAGNOSIS — M16.11 PRIMARY OSTEOARTHRITIS OF ONE HIP, RIGHT: Primary | ICD-10-CM

## 2023-06-08 DIAGNOSIS — Z47.1 AFTERCARE FOLLOWING RIGHT HIP JOINT REPLACEMENT SURGERY: ICD-10-CM

## 2023-06-08 PROCEDURE — 97112 NEUROMUSCULAR REEDUCATION: CPT

## 2023-06-08 PROCEDURE — 97110 THERAPEUTIC EXERCISES: CPT

## 2023-06-08 NOTE — PROGRESS NOTES
"Daily Note     Today's date: 2023  Patient name: Aston Willoughby  : 1945  MRN: [id number]  Referring provider: Mitzy Aly PA-C  Dx:   Encounter Diagnosis     ICD-10-CM    1  Primary osteoarthritis of one hip, right  M16 11       2  Aftercare following right hip joint replacement surgery  Z47 1     Z96 641           Start Time: 0820  Stop Time: 0900  Total time in clinic (min): 40 minutes    Subjective: Patient reports no new complaints  Objective: See treatment diary below      Assessment: Patient tolerated treatment well and was able to complete all exercises without complaints  Difficulty noted with SLR flexion  Pt will benefit from continued skilled PT intervention in order to address remaining limitations and to restore maximal function  Plan: Continue per plan of care        Precautions: COPD, Hx Prostate cancer, hiatal hernia, former smoker      5/10 5/15 5/17 5/22 5/24 5/30 6/1 6/6 6/8    FOTO             IE/RE               Manuals             R hip PROM LILIANA TE LILIANA LILIANA TE IVA TE TG nv                                           Neuro Re-Ed             H/L hip add iso             H/L hip abd clam Purple 5\"x20 nv D/C          Tandem balance nv nv 30\"x2 b/l np nv 30\"x2 b/l  nv EC 30\" x2 b/l floor EC 30\" x2 b/l floor    SLS    Floor 30\"x3 nv Floor 30\"x3 nv 30\" x2 b/l  floor 30\" x2 b/l  floor                 Ther Ex             Mini squats 3x10 3x10 3x10 3x10 3x10 3x10   3x10 3x10 3x10    HR/TR             Stand hip abd, ext 3# 2x10 ea b/l 5# 2x10 b/l 5# 3x10 b/l 5# 3x10 b/l 5# 3x10 b/l 5# 3x10 b/l 5# 2x10 b/l      Stand hip flex   5# 2x10 b/l 5# 3x10 b/l 5# 3x10 b/l 5# 3x10 b/l 5# 2x10 b/l      Stand march 3# 2x10 ea b/l 5# 2x10 b/l np 5# 3x10 b/l 5# 3x10 b/l 5# 3x10 b/l       Stand knee flex 3# 2x10 ea b/l 5# 2x10 b/l 5# 3x10 b/l 5# 3x10 b/l 5# 3x10 b/l 5# 3x10 b/l 5# 2x10 b/l      SA leg curl   22# 2x10 22# 2x10 22# 2x10 22#2x10  nv 33# 2x10 33# 2x10    SA leg ext   22# 2x10 22# 2x10 " "22# 2x10 22# 2x10 nv 33# 2x10 33# 2x10                 SAQ 3# 2x10 5# 2x10           LAQ 3# 2x10 5# 2x10 D/C          Bridges  Purple 5\" 2x10 nv Purple 5\" 2x10 Purple 5\" x30 5\"x20 NV 5\" 2x10 GTB 5\" x10 GTB 5\" x15    SLR - flex, abd   nv 5\"x10 ea 5\"x10 ea 5\"x10 ea 5\"x10 ea  x15 flex                 Ther Activity             Bike  10 min 10 min 10 min 10 min 5 min 6 min  10 min 10 min 10'    Step ups np       1R 2x10 b/l 1R 2x10 b/l    Side stepping nv nv Green 10ft x3 laps missed nv NV  RTB 3 laps GTB 3 laps                              Gait Training                                       Modalities             CP                               "

## 2023-06-12 ENCOUNTER — OFFICE VISIT (OUTPATIENT)
Dept: PHYSICAL THERAPY | Facility: REHABILITATION | Age: 78
End: 2023-06-12
Payer: COMMERCIAL

## 2023-06-12 DIAGNOSIS — Z47.1 AFTERCARE FOLLOWING RIGHT HIP JOINT REPLACEMENT SURGERY: ICD-10-CM

## 2023-06-12 DIAGNOSIS — Z96.641 AFTERCARE FOLLOWING RIGHT HIP JOINT REPLACEMENT SURGERY: ICD-10-CM

## 2023-06-12 DIAGNOSIS — M16.11 PRIMARY OSTEOARTHRITIS OF ONE HIP, RIGHT: Primary | ICD-10-CM

## 2023-06-12 PROCEDURE — 97112 NEUROMUSCULAR REEDUCATION: CPT

## 2023-06-12 PROCEDURE — 97530 THERAPEUTIC ACTIVITIES: CPT

## 2023-06-12 PROCEDURE — 97110 THERAPEUTIC EXERCISES: CPT

## 2023-06-12 NOTE — PROGRESS NOTES
"Daily Note     Today's date: 2023  Patient name: Janis Lin  : 1945  MRN: [id number]  Referring provider: Joey Navarro PA-C  Dx:   Encounter Diagnosis     ICD-10-CM    1  Primary osteoarthritis of one hip, right  M16 11       2  Aftercare following right hip joint replacement surgery  Z47 1     Z96 641                      Subjective: pt reports with c/o continued pain in R hip with activity; denied pain at rest       Objective: See treatment diary below      Assessment: Tolerated treatment well  Good tolerance with exercises with no adverse reactions  PROM appears to be progressing nicely  Patient demonstrated fatigue post treatment, exhibited good technique with therapeutic exercises and would benefit from continued PT      Plan: Continue per plan of care  Progress treatment as tolerated         Precautions: COPD, Hx Prostate cancer, hiatal hernia, former smoker      5/10 5/15 5/17 5/22 5/24 5/30 6/1 6/6 6/8 6/12   FOTO             IE/RE               Manuals             R hip PROM LILIANA TE LILIANA LILIANA TE IVA TE TG nv TE                                          Neuro Re-Ed             H/L hip add iso             H/L hip abd clam Purple 5\"x20 nv D/C          Tandem balance nv nv 30\"x2 b/l np nv 30\"x2 b/l  nv EC 30\" x2 b/l floor EC 30\" x2 b/l floor nv   SLS    Floor 30\"x3 nv Floor 30\"x3 nv 30\" x2 b/l  floor 30\" x2 b/l  floor nv                Ther Ex             Mini squats 3x10 3x10 3x10 3x10 3x10 3x10   3x10 3x10 3x10 3x10   HR/TR             Stand hip abd, ext 3# 2x10 ea b/l 5# 2x10 b/l 5# 3x10 b/l 5# 3x10 b/l 5# 3x10 b/l 5# 3x10 b/l 5# 2x10 b/l   5# 2x10 b/l   Stand hip flex   5# 2x10 b/l 5# 3x10 b/l 5# 3x10 b/l 5# 3x10 b/l 5# 2x10 b/l      Stand march 3# 2x10 ea b/l 5# 2x10 b/l np 5# 3x10 b/l 5# 3x10 b/l 5# 3x10 b/l       Stand knee flex 3# 2x10 ea b/l 5# 2x10 b/l 5# 3x10 b/l 5# 3x10 b/l 5# 3x10 b/l 5# 3x10 b/l 5# 2x10 b/l      SA leg curl   22# 2x10 22# 2x10 22# 2x10 22#2x10  nv 33# 2x10 33# " "2x10 33# 2x10   SA leg ext   22# 2x10 22# 2x10 22# 2x10 22# 2x10 nv 33# 2x10 33# 2x10 33# 2x10                SAQ 3# 2x10 5# 2x10           LAQ 3# 2x10 5# 2x10 D/C          Bridges  Purple 5\" 2x10 nv Purple 5\" 2x10 Purple 5\" x30 5\"x20 NV 5\" 2x10 GTB 5\" x10 GTB 5\" x15 GTB  5\" 3x10   SLR - flex, abd   nv 5\"x10 ea 5\"x10 ea 5\"x10 ea 5\"x10 ea  x15 flex x15 flex                Ther Activity             Bike  10 min 10 min 10 min 10 min 5 min 6 min  10 min 10 min 10' 10 min   Step ups np       1R 2x10 b/l 1R 2x10 b/l 1R x10 ea b/l   Side stepping nv nv Green 10ft x3 laps missed nv NV  RTB 3 laps GTB 3 laps GTB 3 laps                             Gait Training                                       Modalities             CP                               "

## 2023-06-14 ENCOUNTER — OFFICE VISIT (OUTPATIENT)
Dept: PHYSICAL THERAPY | Facility: REHABILITATION | Age: 78
End: 2023-06-14
Payer: COMMERCIAL

## 2023-06-14 DIAGNOSIS — Z47.1 AFTERCARE FOLLOWING RIGHT HIP JOINT REPLACEMENT SURGERY: ICD-10-CM

## 2023-06-14 DIAGNOSIS — M16.11 PRIMARY OSTEOARTHRITIS OF ONE HIP, RIGHT: Primary | ICD-10-CM

## 2023-06-14 DIAGNOSIS — Z96.641 AFTERCARE FOLLOWING RIGHT HIP JOINT REPLACEMENT SURGERY: ICD-10-CM

## 2023-06-14 PROCEDURE — 97110 THERAPEUTIC EXERCISES: CPT | Performed by: PHYSICAL THERAPIST

## 2023-06-14 PROCEDURE — 97140 MANUAL THERAPY 1/> REGIONS: CPT | Performed by: PHYSICAL THERAPIST

## 2023-06-14 NOTE — PROGRESS NOTES
"Daily Note     Today's date: 2023  Patient name: Elli Fregoso  : 1945  MRN: [id number]  Referring provider: Ning Pittman PA-C  Dx:   Encounter Diagnosis     ICD-10-CM    1  Primary osteoarthritis of one hip, right  M16 11       2  Aftercare following right hip joint replacement surgery  Z47 1     Z96 641                      Subjective: Pt comes to therapy reporting continued discomfort in anterior aspect of his knee when walking  States he was feeling a little short of breath last night and this morning  Denies other symptoms (denies diplopia, chest pain, diaphoresis, lightheadedness)  Objective: See treatment diary below      Assessment: Tolerated treatment well  Patient reported SOB following bike x5 min  Vitals measured /70 mmHg, HR 70 bpm, SaO2 95%  Patient demonstrated fatigue post treatment, exhibited good technique with therapeutic exercises and would benefit from continued PT      Plan: Progress treatment as tolerated         Precautions: COPD, Hx Prostate cancer, hiatal hernia, former smoker         FOTO             IE/RE               Manuals             R hip PROM LILIANA  LILIANA LILIANA TE IVA TE TG nv TE                                          Neuro Re-Ed             H/L hip add iso             H/L hip abd clam   D/C          Tandem balance   30\"x2 b/l np nv 30\"x2 b/l  nv EC 30\" x2 b/l floor EC 30\" x2 b/l floor nv   SLS    Floor 30\"x3 nv Floor 30\"x3 nv 30\" x2 b/l  floor 30\" x2 b/l  floor nv                Ther Ex             Mini squats   3x10 3x10 3x10 3x10   3x10 3x10 3x10 3x10   HR/TR             Stand hip abd, ext 5# 3x10 b/l  5# 3x10 b/l 5# 3x10 b/l 5# 3x10 b/l 5# 3x10 b/l 5# 2x10 b/l   5# 2x10 b/l   Stand hip flex   5# 2x10 b/l 5# 3x10 b/l 5# 3x10 b/l 5# 3x10 b/l 5# 2x10 b/l      Stand march   np 5# 3x10 b/l 5# 3x10 b/l 5# 3x10 b/l       Stand knee flex   5# 3x10 b/l 5# 3x10 b/l 5# 3x10 b/l 5# 3x10 b/l 5# 2x10 b/l      SA leg curl 33# " "3x10  22# 2x10 22# 2x10 22# 2x10 22#2x10  nv 33# 2x10 33# 2x10 33# 2x10   SA leg ext 33# 2x10  22# 2x10 22# 2x10 22# 2x10 22# 2x10 nv 33# 2x10 33# 2x10 33# 2x10                SAQ             LAQ   D/C          Bridges  Purple 5\" 3x10  Purple 5\" 2x10 Purple 5\" x30 5\"x20 NV 5\" 2x10 GTB 5\" x10 GTB 5\" x15 GTB  5\" 3x10   Clamshells  Purple 5\" 310            SLR - flex, abd 3x10 ea  nv 5\"x10 ea 5\"x10 ea 5\"x10 ea 5\"x10 ea  x15 flex x15 flex                Ther Activity             Bike  5 min  10 min 10 min 5 min 6 min  10 min 10 min 10' 10 min   Step ups        1R 2x10 b/l 1R 2x10 b/l 1R x10 ea b/l   Side stepping   Green 10ft x3 laps missed nv NV  RTB 3 laps GTB 3 laps GTB 3 laps                             Gait Training                                       Modalities             CP                               "

## 2023-06-19 ENCOUNTER — OFFICE VISIT (OUTPATIENT)
Dept: PHYSICAL THERAPY | Facility: REHABILITATION | Age: 78
End: 2023-06-19
Payer: COMMERCIAL

## 2023-06-19 DIAGNOSIS — Z47.1 AFTERCARE FOLLOWING RIGHT HIP JOINT REPLACEMENT SURGERY: ICD-10-CM

## 2023-06-19 DIAGNOSIS — M16.11 PRIMARY OSTEOARTHRITIS OF ONE HIP, RIGHT: Primary | ICD-10-CM

## 2023-06-19 DIAGNOSIS — Z96.641 AFTERCARE FOLLOWING RIGHT HIP JOINT REPLACEMENT SURGERY: ICD-10-CM

## 2023-06-19 PROCEDURE — 97112 NEUROMUSCULAR REEDUCATION: CPT

## 2023-06-19 PROCEDURE — 97140 MANUAL THERAPY 1/> REGIONS: CPT

## 2023-06-19 PROCEDURE — 97110 THERAPEUTIC EXERCISES: CPT

## 2023-06-19 NOTE — PROGRESS NOTES
"Daily Note     Today's date: 2023  Patient name: Marianna Record  : 1945  MRN: [id number]  Referring provider: Melva Borjas PA-C  Dx:   Encounter Diagnosis     ICD-10-CM    1  Primary osteoarthritis of one hip, right  M16 11       2  Aftercare following right hip joint replacement surgery  Z47 1     Z96 641                      Subjective: pt reports decreased discomfort in hip since last visit  Objective: See treatment diary below      Assessment: Tolerated treatment well and without complaints  Good response with exercises and manuals  Challenged with current program, particularly with SA quads  Patient demonstrated fatigue post treatment, exhibited good technique with therapeutic exercises and would benefit from continued PT      Plan: Continue per plan of care  Progress treatment as tolerated         Precautions: COPD, Hx Prostate cancer, hiatal hernia, former smoker         FOTO             IE/RE               Manuals             R hip PROM LILIANA TE  LILIANA TE IVA TE TG nv TE                                          Neuro Re-Ed             H/L hip add iso             H/L hip abd clam             Tandem balance    np nv 30\"x2 b/l  nv EC 30\" x2 b/l floor EC 30\" x2 b/l floor nv   SLS    Floor 30\"x3 nv Floor 30\"x3 nv 30\" x2 b/l  floor 30\" x2 b/l  floor nv                Ther Ex             Mini squats    3x10 3x10 3x10   3x10 3x10 3x10 3x10   HR/TR             Stand hip abd, ext 5# 3x10 b/l 5# 3x10 b/l  5# 3x10 b/l 5# 3x10 b/l 5# 3x10 b/l 5# 2x10 b/l   5# 2x10 b/l   Stand hip flex    5# 3x10 b/l 5# 3x10 b/l 5# 3x10 b/l 5# 2x10 b/l      Stand march    5# 3x10 b/l 5# 3x10 b/l 5# 3x10 b/l       Stand knee flex    5# 3x10 b/l 5# 3x10 b/l 5# 3x10 b/l 5# 2x10 b/l      SA leg curl 33# 3x10 33# 3x10  22# 2x10 22# 2x10 22#2x10  nv 33# 2x10 33# 2x10 33# 2x10   SA leg ext 33# 2x10 33# 3x10  22# 2x10 22# 2x10 22# 2x10 nv 33# 2x10 33# 2x10 33# 2x10                SAQ    " "         LAQ             Bridges  Purple 5\" 3x10 Purple 5\" 3x10  Purple 5\" x30 5\"x20 NV 5\" 2x10 GTB 5\" x10 GTB 5\" x15 GTB  5\" 3x10   Clamshells  Purple 5\" 310 Purple 5\" 3x10           SLR - flex, abd 3x10 ea nv  5\"x10 ea 5\"x10 ea 5\"x10 ea 5\"x10 ea  x15 flex x15 flex                Ther Activity             Bike  5 min 10 min  10 min 5 min 6 min  10 min 10 min 10' 10 min   Step ups  1R x10      1R 2x10 b/l 1R 2x10 b/l 1R x10 ea b/l   Side stepping  GTB x4 laps  missed nv NV  RTB 3 laps GTB 3 laps GTB 3 laps                             Gait Training                                       Modalities             CP                               "

## 2023-06-21 ENCOUNTER — OFFICE VISIT (OUTPATIENT)
Dept: PHYSICAL THERAPY | Facility: REHABILITATION | Age: 78
End: 2023-06-21
Payer: COMMERCIAL

## 2023-06-21 DIAGNOSIS — M16.11 PRIMARY OSTEOARTHRITIS OF ONE HIP, RIGHT: Primary | ICD-10-CM

## 2023-06-21 DIAGNOSIS — Z47.1 AFTERCARE FOLLOWING RIGHT HIP JOINT REPLACEMENT SURGERY: ICD-10-CM

## 2023-06-21 DIAGNOSIS — Z96.641 AFTERCARE FOLLOWING RIGHT HIP JOINT REPLACEMENT SURGERY: ICD-10-CM

## 2023-06-21 PROCEDURE — 97110 THERAPEUTIC EXERCISES: CPT | Performed by: PHYSICAL THERAPIST

## 2023-06-21 PROCEDURE — 97140 MANUAL THERAPY 1/> REGIONS: CPT | Performed by: PHYSICAL THERAPIST

## 2023-06-21 NOTE — PROGRESS NOTES
"Daily Note     Today's date: 2023  Patient name: Chuyita Penaloza  : 1945  MRN: [id number]  Referring provider: Yves Allan PA-C  Dx:   Encounter Diagnosis     ICD-10-CM    1  Primary osteoarthritis of one hip, right  M16 11       2  Aftercare following right hip joint replacement surgery  Z47 1     Z96 641                      Subjective: Pt comes to therapy denying pain or discomfort  Denies discomfort following last treatment session  Objective: See treatment diary below      Assessment: Tolerated treatment well  Patient demonstrated fatigue post treatment, exhibited good technique with therapeutic exercises and would benefit from continued PT      Plan: Progress treatment as tolerated         Precautions: COPD, Hx Prostate cancer, hiatal hernia, former smoker         FOTO             IE/RE               Manuals             R hip PROM LILIANA TE LILIANA     TG nv TE                                          Neuro Re-Ed             Tandem balance        EC 30\" x2 b/l floor EC 30\" x2 b/l floor nv   SLS        30\" x2 b/l  floor 30\" x2 b/l  floor nv                Ther Ex             Mini squats        3x10 3x10 3x10   HR/TR             Stand hip abd, ext 5# 3x10 b/l 5# 3x10 b/l 5# 3x10 b/l       5# 2x10 b/l   Stand hip flex   5# 3x10 b/l          Stand knee flex   5# 3x10 b/l          SA leg curl 33# 3x10 33# 3x10 33# 3x10     33# 2x10 33# 2x10 33# 2x10   SA leg ext 33# 2x10 33# 3x10 33# 3x10     33# 2x10 33# 2x10 33# 2x10                Bridges  Purple 5\" 3x10 Purple 5\" 3x10 Purple 5\" 3x10     GTB 5\" x10 GTB 5\" x15 GTB  5\" 3x10   Clamshells  Purple 5\" 310 Purple 5\" 3x10 Purple 5\" 3x10          SLR - flex, abd 3x10 ea nv nv      x15 flex x15 flex                Ther Activity             Bike  5 min 10 min 10 min     10 min 10' 10 min   Step ups  1R x10 np     1R 2x10 b/l 1R 2x10 b/l 1R x10 ea b/l   Side stepping  GTB x4 laps Black x5 laps     RTB 3 laps GTB 3 laps GTB 3 " laps                             Gait Training                                       Modalities             CP

## 2023-06-25 DIAGNOSIS — J43.1 PANLOBULAR EMPHYSEMA (HCC): ICD-10-CM

## 2023-06-25 NOTE — TELEPHONE ENCOUNTER
Medication Refill Request     inhaler  Inhale 1 puff daily Rinse mouth after use , Starting Tue 3/28/2023, Until Mon 6/26/2023, Normal   Dispense: 180 blister   Refills: 1 ordered   Pharmacy: [facility], [address] [facility] (Ph: [phone]       Verified pharmacy   [x]  Verified ordering Provider   [x]  Does patient have enough for the next 3 days?  Yes [x] No []

## 2023-06-26 ENCOUNTER — OFFICE VISIT (OUTPATIENT)
Dept: PHYSICAL THERAPY | Facility: REHABILITATION | Age: 78
End: 2023-06-26
Payer: COMMERCIAL

## 2023-06-26 DIAGNOSIS — Z47.1 AFTERCARE FOLLOWING RIGHT HIP JOINT REPLACEMENT SURGERY: ICD-10-CM

## 2023-06-26 DIAGNOSIS — M16.11 PRIMARY OSTEOARTHRITIS OF ONE HIP, RIGHT: Primary | ICD-10-CM

## 2023-06-26 DIAGNOSIS — Z96.641 AFTERCARE FOLLOWING RIGHT HIP JOINT REPLACEMENT SURGERY: ICD-10-CM

## 2023-06-26 PROCEDURE — 97110 THERAPEUTIC EXERCISES: CPT | Performed by: PHYSICAL THERAPIST

## 2023-06-26 PROCEDURE — 97140 MANUAL THERAPY 1/> REGIONS: CPT | Performed by: PHYSICAL THERAPIST

## 2023-06-26 NOTE — PROGRESS NOTES
"Daily Note     Today's date: 2023  Patient name: Ulysees Schilder  : 1945  MRN: [id number]  Referring provider: Mike Gutierrez PA-C  Dx:   Encounter Diagnosis     ICD-10-CM    1  Primary osteoarthritis of one hip, right  M16 11       2  Aftercare following right hip joint replacement surgery  Z47 1     Z96 641                      Subjective: Pt comes to therapy denying pain or discomfort  Denies discomfort following last treatment session  Objective: See treatment diary below      Assessment: Tolerated treatment well  Patient demonstrated fatigue post treatment, exhibited good technique with therapeutic exercises and would benefit from continued PT      Plan: Potential discharge next visit  Progress treatment as tolerated         Precautions: COPD, Hx Prostate cancer, hiatal hernia, former smoker         FOTO    nv         IE/RE               Manuals             R hip PROM LILIANA TE Chick Sarks    TG nv TE                                          Neuro Re-Ed             Tandem balance        EC 30\" x2 b/l floor EC 30\" x2 b/l floor nv   SLS    30\" x2 b/l floor    30\" x2 b/l  floor 30\" x2 b/l  floor nv                Ther Ex             Mini squats    3x10    3x10 3x10 3x10   HR/TR             Stand hip abd, ext 5# 3x10 b/l 5# 3x10 b/l 5# 3x10 b/l       5# 2x10 b/l   Stand hip flex   5# 3x10 b/l          Stand knee flex   5# 3x10 b/l          SA leg curl 33# 3x10 33# 3x10 33# 3x10 33# 3x10    33# 2x10 33# 2x10 33# 2x10   SA leg ext 33# 2x10 33# 3x10 33# 3x10 33# 3x10    33# 2x10 33# 2x10 33# 2x10                Bridges  Purple 5\" 3x10 Purple 5\" 3x10 Purple 5\" 3x10 Purple 5\" 3x10    GTB 5\" x10 GTB 5\" x15 GTB  5\" 3x10   Clamshells  Purple 5\" 310 Purple 5\" 3x10 Purple 5\" 3x10 Purple 5\" 3x10         SLR - flex, abd 3x10 ea nv nv 3x10 ea     x15 flex x15 flex                Ther Activity             Bike  5 min 10 min 10 min np    10 min 10' 10 min   Step ups  1R x10 np     " 1R 2x10 b/l 1R 2x10 b/l 1R x10 ea b/l   Side stepping  GTB x4 laps Black x5 laps Black x5 laps    RTB 3 laps GTB 3 laps GTB 3 laps                             Gait Training                                       Modalities             CP

## 2023-06-27 ENCOUNTER — OFFICE VISIT (OUTPATIENT)
Dept: OBGYN CLINIC | Facility: MEDICAL CENTER | Age: 78
End: 2023-06-27

## 2023-06-27 ENCOUNTER — APPOINTMENT (OUTPATIENT)
Dept: RADIOLOGY | Facility: MEDICAL CENTER | Age: 78
End: 2023-06-27
Payer: COMMERCIAL

## 2023-06-27 VITALS
BODY MASS INDEX: 18.04 KG/M2 | HEIGHT: 68 IN | SYSTOLIC BLOOD PRESSURE: 136 MMHG | WEIGHT: 119 LBS | HEART RATE: 69 BPM | DIASTOLIC BLOOD PRESSURE: 79 MMHG

## 2023-06-27 DIAGNOSIS — Z47.1 AFTERCARE FOLLOWING RIGHT HIP JOINT REPLACEMENT SURGERY: Primary | ICD-10-CM

## 2023-06-27 DIAGNOSIS — Z47.1 AFTERCARE FOLLOWING RIGHT HIP JOINT REPLACEMENT SURGERY: ICD-10-CM

## 2023-06-27 DIAGNOSIS — Z96.641 AFTERCARE FOLLOWING RIGHT HIP JOINT REPLACEMENT SURGERY: Primary | ICD-10-CM

## 2023-06-27 DIAGNOSIS — Z96.641 AFTERCARE FOLLOWING RIGHT HIP JOINT REPLACEMENT SURGERY: ICD-10-CM

## 2023-06-27 PROCEDURE — 73502 X-RAY EXAM HIP UNI 2-3 VIEWS: CPT

## 2023-06-27 PROCEDURE — 99024 POSTOP FOLLOW-UP VISIT: CPT | Performed by: STUDENT IN AN ORGANIZED HEALTH CARE EDUCATION/TRAINING PROGRAM

## 2023-06-27 NOTE — PROGRESS NOTES
Subjective:Patient seen and examined  Pain controlled- states he takes tylenol as needed  He is ambulating unassisted  He is back to doing his normal daily activities  He states that he lifted a heavy trash bag which did aggravate his hip, but since then it has improved  Progressing well  Incision without drainage  Denies fevers or chills    Physical Exam:  Incision: well healed surgical incision  ROM: normal post op motion wo pain  5/5 IP/Q/HS/TA/GS, 2+ DP/PT, SILT DP/SP/S/S/TN    XR R hip: s/p press-fit total hip arthroplasty, components in good position  No fracture or dislocation  Assessment/Plan:  75 y/o male doing very well 12 weeks s/p Right MANDO from 4/5/23    - continue multi-modal pain control   - Weight bearing status: WBAT  - DVT ppx: completed  - Incision care: No restrictions   - prophylactic dental antibiotics for dental cleanings/procedures  - Activities as tolerated  - F/U in 9 months for annual visit with xrays        Scribe Attestation    I,:  Rani Ziegler PA-C am acting as a scribe while in the presence of the attending physician :       I,:  Lizzeth Carrasquillo, DO personally performed the services described in this documentation    as scribed in my presence :

## 2023-06-28 ENCOUNTER — OFFICE VISIT (OUTPATIENT)
Dept: PHYSICAL THERAPY | Facility: REHABILITATION | Age: 78
End: 2023-06-28
Payer: COMMERCIAL

## 2023-06-28 DIAGNOSIS — M16.11 PRIMARY OSTEOARTHRITIS OF ONE HIP, RIGHT: Primary | ICD-10-CM

## 2023-06-28 DIAGNOSIS — Z96.641 AFTERCARE FOLLOWING RIGHT HIP JOINT REPLACEMENT SURGERY: ICD-10-CM

## 2023-06-28 DIAGNOSIS — Z47.1 AFTERCARE FOLLOWING RIGHT HIP JOINT REPLACEMENT SURGERY: ICD-10-CM

## 2023-06-28 PROCEDURE — 97110 THERAPEUTIC EXERCISES: CPT

## 2023-06-28 PROCEDURE — 97140 MANUAL THERAPY 1/> REGIONS: CPT

## 2023-06-28 NOTE — PROGRESS NOTES
"Daily Note     Today's date: 2023  Patient name: Jess Quintanilla  : 1945  MRN: [id number]  Referring provider: Naresh Rivera PA-C  Dx:   Encounter Diagnosis     ICD-10-CM    1  Primary osteoarthritis of one hip, right  M16 11       2  Aftercare following right hip joint replacement surgery  Z47 1     Z96 641                       Subjective: pt currently denied pain in hip as well as functional limitations  He returned to surgeon who was pleased with his progress  Objective: See treatment diary below      Assessment: Tolerated treatment well  PROM progressed nicely through duration of therapy  Good response with exercises  Patient demonstrated fatigue post treatment and exhibited good technique with therapeutic exercises  Plan: pt currently discharged from PT to be independent in HEP       Precautions: COPD, Hx Prostate cancer, hiatal hernia, former smoker         FOTO    nv         IE/RE               Manuals             R hip PROM LILIANA TE LILIANA LILIANA TE   TG nv TE                                          Neuro Re-Ed             Tandem balance        EC 30\" x2 b/l floor EC 30\" x2 b/l floor nv   SLS    30\" x2 b/l floor    30\" x2 b/l  floor 30\" x2 b/l  floor nv                Ther Ex             Mini squats    3x10    3x10 3x10 3x10   HR/TR             Stand hip abd, ext 5# 3x10 b/l 5# 3x10 b/l 5# 3x10 b/l       5# 2x10 b/l   Stand hip flex   5# 3x10 b/l          Stand knee flex   5# 3x10 b/l          SA leg curl 33# 3x10 33# 3x10 33# 3x10 33# 3x10 33# 3x10   33# 2x10 33# 2x10 33# 2x10   SA leg ext 33# 2x10 33# 3x10 33# 3x10 33# 3x10 33# 3x10   33# 2x10 33# 2x10 33# 2x10                Bridges  Purple 5\" 3x10 Purple 5\" 3x10 Purple 5\" 3x10 Purple 5\" 3x10 Purple 5\" 3x10   GTB 5\" x10 GTB 5\" x15 GTB  5\" 3x10   Clamshells  Purple 5\" 310 Purple 5\" 3x10 Purple 5\" 3x10 Purple 5\" 3x10 Purple 5\" 3x10        SLR - flex, abd 3x10 ea nv nv 3x10 ea 3x10 flex only    x15 " flex x15 flex                Ther Activity             Bike  5 min 10 min 10 min np 10 min   10 min 10' 10 min   Step ups  1R x10 np     1R 2x10 b/l 1R 2x10 b/l 1R x10 ea b/l   Side stepping  GTB x4 laps Black x5 laps Black x5 laps    RTB 3 laps GTB 3 laps GTB 3 laps                             Gait Training                                       Modalities             CP

## 2023-07-12 ENCOUNTER — APPOINTMENT (OUTPATIENT)
Dept: LAB | Age: 78
End: 2023-07-12
Payer: COMMERCIAL

## 2023-07-12 ENCOUNTER — OFFICE VISIT (OUTPATIENT)
Dept: INTERNAL MEDICINE CLINIC | Age: 78
End: 2023-07-12
Payer: COMMERCIAL

## 2023-07-12 VITALS
HEART RATE: 54 BPM | HEIGHT: 67 IN | WEIGHT: 123 LBS | BODY MASS INDEX: 19.3 KG/M2 | OXYGEN SATURATION: 92 % | TEMPERATURE: 96 F | DIASTOLIC BLOOD PRESSURE: 80 MMHG | SYSTOLIC BLOOD PRESSURE: 124 MMHG

## 2023-07-12 DIAGNOSIS — N52.35 ERECTILE DYSFUNCTION FOLLOWING RADIATION THERAPY: ICD-10-CM

## 2023-07-12 DIAGNOSIS — Z12.2 ENCOUNTER FOR SCREENING FOR LUNG CANCER: ICD-10-CM

## 2023-07-12 DIAGNOSIS — J43.1 PANLOBULAR EMPHYSEMA (HCC): Primary | ICD-10-CM

## 2023-07-12 DIAGNOSIS — K22.70 BARRETT'S ESOPHAGUS WITHOUT DYSPLASIA: ICD-10-CM

## 2023-07-12 PROBLEM — D72.829 LEUCOCYTOSIS: Status: RESOLVED | Noted: 2023-04-06 | Resolved: 2023-07-12

## 2023-07-12 PROBLEM — Z96.641 HISTORY OF RIGHT HIP REPLACEMENT: Status: RESOLVED | Noted: 2023-04-13 | Resolved: 2023-07-12

## 2023-07-12 LAB
ANION GAP SERPL CALCULATED.3IONS-SCNC: 2 MMOL/L
BASOPHILS # BLD AUTO: 0.04 THOUSANDS/ÂΜL (ref 0–0.1)
BASOPHILS NFR BLD AUTO: 1 % (ref 0–1)
BUN SERPL-MCNC: 16 MG/DL (ref 5–25)
CALCIUM SERPL-MCNC: 9.2 MG/DL (ref 8.3–10.1)
CHLORIDE SERPL-SCNC: 110 MMOL/L (ref 96–108)
CO2 SERPL-SCNC: 27 MMOL/L (ref 21–32)
CREAT SERPL-MCNC: 0.82 MG/DL (ref 0.6–1.3)
EOSINOPHIL # BLD AUTO: 0.17 THOUSAND/ÂΜL (ref 0–0.61)
EOSINOPHIL NFR BLD AUTO: 2 % (ref 0–6)
ERYTHROCYTE [DISTWIDTH] IN BLOOD BY AUTOMATED COUNT: 14.4 % (ref 11.6–15.1)
GFR SERPL CREATININE-BSD FRML MDRD: 84 ML/MIN/1.73SQ M
GLUCOSE P FAST SERPL-MCNC: 90 MG/DL (ref 65–99)
HCT VFR BLD AUTO: 45.3 % (ref 36.5–49.3)
HGB BLD-MCNC: 14.6 G/DL (ref 12–17)
IMM GRANULOCYTES # BLD AUTO: 0.04 THOUSAND/UL (ref 0–0.2)
IMM GRANULOCYTES NFR BLD AUTO: 1 % (ref 0–2)
LYMPHOCYTES # BLD AUTO: 1.22 THOUSANDS/ÂΜL (ref 0.6–4.47)
LYMPHOCYTES NFR BLD AUTO: 16 % (ref 14–44)
MCH RBC QN AUTO: 31.6 PG (ref 26.8–34.3)
MCHC RBC AUTO-ENTMCNC: 32.2 G/DL (ref 31.4–37.4)
MCV RBC AUTO: 98 FL (ref 82–98)
MONOCYTES # BLD AUTO: 0.89 THOUSAND/ÂΜL (ref 0.17–1.22)
MONOCYTES NFR BLD AUTO: 11 % (ref 4–12)
NEUTROPHILS # BLD AUTO: 5.42 THOUSANDS/ÂΜL (ref 1.85–7.62)
NEUTS SEG NFR BLD AUTO: 69 % (ref 43–75)
NRBC BLD AUTO-RTO: 0 /100 WBCS
PLATELET # BLD AUTO: 250 THOUSANDS/UL (ref 149–390)
PMV BLD AUTO: 9.6 FL (ref 8.9–12.7)
POTASSIUM SERPL-SCNC: 4.5 MMOL/L (ref 3.5–5.3)
RBC # BLD AUTO: 4.62 MILLION/UL (ref 3.88–5.62)
SODIUM SERPL-SCNC: 139 MMOL/L (ref 135–147)
WBC # BLD AUTO: 7.78 THOUSAND/UL (ref 4.31–10.16)

## 2023-07-12 PROCEDURE — 36415 COLL VENOUS BLD VENIPUNCTURE: CPT

## 2023-07-12 PROCEDURE — 80048 BASIC METABOLIC PNL TOTAL CA: CPT

## 2023-07-12 PROCEDURE — 85025 COMPLETE CBC W/AUTO DIFF WBC: CPT

## 2023-07-12 PROCEDURE — 99214 OFFICE O/P EST MOD 30 MIN: CPT | Performed by: INTERNAL MEDICINE

## 2023-07-12 NOTE — PROGRESS NOTES
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 It may contain errors with dictating incorrect words or incorrect spelling. Please contact the provider directly with any questions. Subjective:   Chief Complaint   Patient presents with   • Follow-up     4 m f/u visit for panlobular emphysema, no recent labs. He c/o shortness of breath on exertion. Due for CT Lung screening. Patient ID: Param Almanza is a 76 y.o. male. Is a very pleasant 76 years young gentleman who is here today for the regular follow-up he is doing good but still having problems with the shortness of breath he is a severe emphysema he is requesting that he should be seen at the [facility] [place] if they can help him for his breathing otherwise no new issues he is active he does his activities he has shortness of breath on rest and also on exertion he is on Trulicity he is not a smoking anymore . 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 He recently had a hip replacement is doing very well with that  He was losing weight and now the weight is a stable.   Esophagus no problem with the swallowing followed up by the GI      The following portions of the patient's history were reviewed and updated as appropriate: allergies, current medications, past family history, past medical history, past social history, past surgical history and problem list.    Review of Systems   Constitutional: Positive for unexpected weight change. Negative for appetite change, fatigue and fever. HENT: Negative for congestion, ear pain, hearing loss, nosebleeds, sneezing, tinnitus and voice change. Eyes: Negative for pain, discharge and redness. Respiratory: Positive for shortness of breath. Negative for cough, chest tightness and wheezing. Cardiovascular: Negative for chest pain, palpitations and leg swelling. Gastrointestinal: Negative for abdominal pain, blood in stool, constipation, diarrhea, nausea and vomiting. Genitourinary: Negative for difficulty urinating, dysuria, hematuria and urgency. Musculoskeletal: Negative for arthralgias, back pain, gait problem and joint swelling. Skin: Negative for rash and wound. Allergic/Immunologic: Negative for environmental allergies. Neurological: Negative for dizziness, tremors, seizures, weakness, light-headedness and numbness. Hematological: Negative for adenopathy. Does not bruise/bleed easily. Psychiatric/Behavioral: Negative for behavioral problems and confusion. The patient is not nervous/anxious.           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   • Rectal adenocarcinoma ([address])     per pt not sure of this   • Right hip pain    • Shortness of breath     "with activity not if sitting or sleeping"   • Shoulder pain     left--"feels like cracking"   • Spermatocele     "had surgery"         Current Outpatient Medications:   •  acetaminophen (TYLENOL) 500 mg tablet, Take 2 tablets (1,000 mg total) by mouth every 8 (eight) hours, Disp: 60 tablet, Rfl: 0  •  albuterol (Proventil HFA) 90 mcg/act inhaler, Inhale 2 puffs every 6 (six) hours as needed for wheezing, Disp: 6.7 g, Rfl: 5  •  cholecalciferol (VITAMIN D3) 1,000 units tablet, Take 2 tablets (2,000 Units total) by mouth daily, Disp: 60 tablet, Rfl: 1  •  fluticasone-umeclidinium-vilanterol (Trelegy Ellipta) 100-62.5-25 mcg/actuation inhaler, Inhale 1 puff daily Rinse mouth after use., Disp: 180 blister, Rfl: 1    Allergies   Allergen Reactions   • Mobic [Meloxicam] Rash   • Penicillins Hives and Other (See Comments)     Other reaction(s): facial swelling  Reaction Unknown       Social History   Past Surgical History:   Procedure Laterality Date   • CERVICAL FUSION     • COLONOSCOPY     • CT NEEDLE BIOPSY LUNG  07/09/2019   • DENTAL IMPLANT     • EGD     • FL INJECTION RIGHT HIP (NON ARTHROGRAM)  11/25/2022   • HERNIA REPAIR     • INSERTION PROSTATE RADIATION SEED     • NE ARTHRP ACETBLR/PROX FEM PROSTC AGRFT/ALGRFT Right 4/5/2023    Procedure: ARTHROPLASTY HIP TOTAL ANTERIOR,NAVIGATED;  Surgeon: Ashlee Ferreira DO;  Location:  MAIN OR;  Service: Orthopedics   • NE INSJ MULTI-COMPONENT INFLATABLE PENILE [address] N/A 03/08/2016    Procedure: INSERTION OF THREE PIECE PENILE PROSTHESIS;  Surgeon: Robi Gutierrez MD;  Location:  MAIN OR;  Service: Urology   • PROSTATE BIOPSY  01/06/2014   • ROTATOR CUFF REPAIR Bilateral      Family History   Problem Relation Age of Onset   • No Known Problems Mother    • No Known Problems Father        Objective:  /80 (BP Location: Left arm, Patient Position: Sitting, Cuff Size: Standard)   Pulse (!) 54   Temp (!) 96 °F (35.6 °C) (Tympanic)   Ht 5' 7.48" (1.714 m)   Wt 55.8 kg (123 lb)   SpO2 92%   BMI 18.99 kg/m²        Physical Exam  Vitals and nursing note reviewed.    Constitutional:       Appearance: He is well-developed and normal weight. HENT:      Right Ear: External ear normal.   Eyes:      Conjunctiva/sclera: Conjunctivae normal.      Pupils: Pupils are equal, round, and reactive to light. Neck:      Thyroid: No thyromegaly. Vascular: No JVD. Cardiovascular:      Rate and Rhythm: Normal rate and regular rhythm. Heart sounds: Normal heart sounds. Pulmonary:      Breath sounds: Decreased air movement present. Decreased breath sounds present. Abdominal:      General: Bowel sounds are normal.      Palpations: Abdomen is soft. Musculoskeletal:         General: Normal range of motion. Cervical back: Normal range of motion. Lymphadenopathy:      Cervical: No cervical adenopathy. Skin:     General: Skin is dry. Neurological:      Mental Status: He is alert and oriented to person, place, and time. Deep Tendon Reflexes: Reflexes are normal and symmetric.    Psychiatric:         Behavior: Behavior normal.

## 2023-07-14 ENCOUNTER — TELEPHONE (OUTPATIENT)
Dept: INTERNAL MEDICINE CLINIC | Age: 78
End: 2023-07-14

## 2023-07-14 NOTE — TELEPHONE ENCOUNTER
Patient's CT Lung screening is not going to be covered by insurance due to his age. He would have to be between the ages of [address] [facility] and 66 and he is 76. Do you recommend any other imaging for this?

## 2023-07-18 DIAGNOSIS — J43.1 PANLOBULAR EMPHYSEMA (HCC): ICD-10-CM

## 2023-07-18 DIAGNOSIS — Z72.0 TOBACCO ABUSE: ICD-10-CM

## 2023-07-18 DIAGNOSIS — R91.8 LUNG NODULES: Primary | ICD-10-CM

## 2023-07-18 NOTE — TELEPHONE ENCOUNTER
Okay, thank you, please let me know when it is completed so I can call patient and have him reschedule the appointment.

## 2023-07-26 ENCOUNTER — HOSPITAL ENCOUNTER (OUTPATIENT)
Dept: RADIOLOGY | Facility: IMAGING CENTER | Age: 78
Discharge: HOME | End: 2023-07-26
Payer: COMMERCIAL

## 2023-07-26 DIAGNOSIS — Z72.0 TOBACCO ABUSE: ICD-10-CM

## 2023-07-26 DIAGNOSIS — R91.8 LUNG NODULES: ICD-10-CM

## 2023-07-26 DIAGNOSIS — J43.1 PANLOBULAR EMPHYSEMA (HCC): ICD-10-CM

## 2023-07-26 PROCEDURE — 71250 CT THORAX DX C-: CPT

## 2023-07-26 PROCEDURE — G1004 CDSM NDSC: HCPCS

## 2023-08-14 ENCOUNTER — TELEPHONE (OUTPATIENT)
Dept: INTERNAL MEDICINE CLINIC | Facility: OTHER | Age: 78
End: 2023-08-14

## 2023-08-14 NOTE — TELEPHONE ENCOUNTER
Will need to repeat his CAT scan in 3-month there were some lung nodules.   Please give him the phone number for [facility] [phone]

## 2023-08-14 NOTE — TELEPHONE ENCOUNTER
Patient calling to get the results of his CT results that he had completed on 7/26/23. He also mentioned something about being referred to a [place] lung doctor?

## 2023-08-15 NOTE — TELEPHONE ENCOUNTER
Result given to patient. I called for patient and made an appointment with [address] [place] [address] for October 11th at 9:40am. Patient coming to  paper with appointment info on it. Instruction patient he must call medical records to have disc made from his scans and any other relating document to be sent over.

## 2023-10-23 ENCOUNTER — HOSPITAL ENCOUNTER (OUTPATIENT)
Dept: RADIOLOGY | Facility: HOSPITAL | Age: 78
Discharge: HOME | End: 2023-10-23
Payer: COMMERCIAL

## 2023-10-23 ENCOUNTER — HOSPITAL ENCOUNTER (OUTPATIENT)
Dept: PULMONOLOGY | Facility: HOSPITAL | Age: 78
Discharge: HOME | End: 2023-10-23
Payer: COMMERCIAL

## 2023-10-23 DIAGNOSIS — J44.89 OTHER SPECIFIED CHRONIC OBSTRUCTIVE PULMONARY DISEASE: ICD-10-CM

## 2023-10-23 DIAGNOSIS — R91.8 OTHER NONSPECIFIC ABNORMAL FINDING OF LUNG FIELD: ICD-10-CM

## 2023-10-23 PROCEDURE — 94060 EVALUATION OF WHEEZING: CPT

## 2023-10-23 PROCEDURE — 94729 DIFFUSING CAPACITY: CPT

## 2023-10-23 PROCEDURE — 94060 EVALUATION OF WHEEZING: CPT | Performed by: INTERNAL MEDICINE

## 2023-10-23 PROCEDURE — 71250 CT THORAX DX C-: CPT

## 2023-10-23 PROCEDURE — 94760 N-INVAS EAR/PLS OXIMETRY 1: CPT

## 2023-10-23 PROCEDURE — 94729 DIFFUSING CAPACITY: CPT | Performed by: INTERNAL MEDICINE

## 2023-10-23 PROCEDURE — 94726 PLETHYSMOGRAPHY LUNG VOLUMES: CPT

## 2023-10-23 PROCEDURE — 94726 PLETHYSMOGRAPHY LUNG VOLUMES: CPT | Performed by: INTERNAL MEDICINE

## 2023-10-23 RX ORDER — ALBUTEROL SULFATE 2.5 MG/3ML
2.5 SOLUTION RESPIRATORY (INHALATION) ONCE
Status: COMPLETED | OUTPATIENT
Start: 2023-10-23 | End: 2023-10-23

## 2023-10-23 RX ADMIN — ALBUTEROL SULFATE 2.5 MG: 2.5 SOLUTION RESPIRATORY (INHALATION) at 15:35

## 2023-11-13 ENCOUNTER — TELEPHONE (OUTPATIENT)
Dept: INTERNAL MEDICINE CLINIC | Facility: OTHER | Age: 78
End: 2023-11-13

## 2023-11-13 NOTE — TELEPHONE ENCOUNTER
Patient is having dental work tomorrow, 11/14/2023 and would like to know if he has to take antibiotics prior to his dental appointment since he had hip replacement surgery in March 2023. If so, please order appropriate antibiotics for the patient. Thank you!

## 2024-01-08 DIAGNOSIS — J43.1 PANLOBULAR EMPHYSEMA (HCC): ICD-10-CM

## 2024-01-08 RX ORDER — FLUTICASONE FUROATE, UMECLIDINIUM BROMIDE AND VILANTEROL TRIFENATATE 100; 62.5; 25 UG/1; UG/1; UG/1
1 POWDER RESPIRATORY (INHALATION) DAILY
Qty: 180 BLISTER | Refills: 1 | Status: SHIPPED | OUTPATIENT
Start: 2024-01-08

## 2024-02-08 DIAGNOSIS — J43.1 PANLOBULAR EMPHYSEMA (HCC): ICD-10-CM

## 2024-02-08 RX ORDER — FLUTICASONE FUROATE, UMECLIDINIUM BROMIDE AND VILANTEROL TRIFENATATE 100; 62.5; 25 UG/1; UG/1; UG/1
1 POWDER RESPIRATORY (INHALATION) DAILY
Qty: 180 BLISTER | Refills: 1 | Status: SHIPPED | OUTPATIENT
Start: 2024-02-08

## 2024-02-14 ENCOUNTER — OFFICE VISIT (OUTPATIENT)
Dept: INTERNAL MEDICINE CLINIC | Age: 79
End: 2024-02-14
Payer: COMMERCIAL

## 2024-02-14 VITALS
DIASTOLIC BLOOD PRESSURE: 70 MMHG | TEMPERATURE: 98 F | BODY MASS INDEX: 20.09 KG/M2 | SYSTOLIC BLOOD PRESSURE: 100 MMHG | WEIGHT: 128 LBS | HEIGHT: 67 IN | HEART RATE: 76 BPM | OXYGEN SATURATION: 96 %

## 2024-02-14 DIAGNOSIS — E55.9 VITAMIN D DEFICIENCY: ICD-10-CM

## 2024-02-14 DIAGNOSIS — E44.0 MODERATE PROTEIN-CALORIE MALNUTRITION (HCC): Primary | ICD-10-CM

## 2024-02-14 DIAGNOSIS — C61 PROSTATE CANCER (HCC): ICD-10-CM

## 2024-02-14 DIAGNOSIS — J43.1 PANLOBULAR EMPHYSEMA (HCC): ICD-10-CM

## 2024-02-14 DIAGNOSIS — K22.70 BARRETT'S ESOPHAGUS WITHOUT DYSPLASIA: ICD-10-CM

## 2024-02-14 DIAGNOSIS — K59.04 CHRONIC IDIOPATHIC CONSTIPATION: ICD-10-CM

## 2024-02-14 PROBLEM — R63.4 WEIGHT LOSS: Status: RESOLVED | Noted: 2023-03-06 | Resolved: 2024-02-14

## 2024-02-14 PROCEDURE — G0439 PPPS, SUBSEQ VISIT: HCPCS | Performed by: INTERNAL MEDICINE

## 2024-02-14 PROCEDURE — 99214 OFFICE O/P EST MOD 30 MIN: CPT | Performed by: INTERNAL MEDICINE

## 2024-02-14 NOTE — PATIENT INSTRUCTIONS
Medicare Preventive Visit Patient Instructions  Thank you for completing your Welcome to Medicare Visit or Medicare Annual Wellness Visit today. Your next wellness visit will be due in one year (2/14/2025).  The screening/preventive services that you may require over the next 5-10 years are detailed below. Some tests may not apply to you based off risk factors and/or age. Screening tests ordered at today's visit but not completed yet may show as past due. Also, please note that scanned in results may not display below.  Preventive Screenings:  Service Recommendations Previous Testing/Comments   Colorectal Cancer Screening  Colonoscopy    Fecal Occult Blood Test (FOBT)/Fecal Immunochemical Test (FIT)  Fecal DNA/Cologuard Test  Flexible Sigmoidoscopy Age: 45-75 years old   Colonoscopy: every 10 years (May be performed more frequently if at higher risk)  OR  FOBT/FIT: every 1 year  OR  Cologuard: every 3 years  OR  Sigmoidoscopy: every 5 years  Screening may be recommended earlier than age 45 if at higher risk for colorectal cancer. Also, an individualized decision between you and your healthcare provider will decide whether screening between the ages of 76-85 would be appropriate. Colonoscopy: 12/23/2019  FOBT/FIT: Not on file  Cologuard: Not on file  Sigmoidoscopy: Not on file    History Colorectal Cancer     Prostate Cancer Screening Individualized decision between patient and health care provider in men between ages of 55-69   Medicare will cover every 12 months beginning on the day after your 50th birthday PSA: 0.3 ng/mL     History Prostate Cancer  Screening Not Indicated     Hepatitis C Screening Once for adults born between 1945 and 1965  More frequently in patients at high risk for Hepatitis C Hep C Antibody: 07/26/2021    Screening Current   Diabetes Screening 1-2 times per year if you're at risk for diabetes or have pre-diabetes Fasting glucose: 90 mg/dL (7/12/2023)  A1C: 5.4 % (3/11/2023)  Screening Current    Cholesterol Screening Once every 5 years if you don't have a lipid disorder. May order more often based on risk factors. Lipid panel: 11/01/2022  Screening Current      Other Preventive Screenings Covered by Medicare:  Abdominal Aortic Aneurysm (AAA) Screening: covered once if your at risk. You're considered to be at risk if you have a family history of AAA or a male between the age of 65-75 who smoking at least 100 cigarettes in your lifetime.  Lung Cancer Screening: covers low dose CT scan once per year if you meet all of the following conditions: (1) Age 55-77; (2) No signs or symptoms of lung cancer; (3) Current smoker or have quit smoking within the last 15 years; (4) You have a tobacco smoking history of at least 20 pack years (packs per day x number of years you smoked); (5) You get a written order from a healthcare provider.  Glaucoma Screening: covered annually if you're considered high risk: (1) You have diabetes OR (2) Family history of glaucoma OR (3)  aged 50 and older OR (4)  American aged 65 and older  Osteoporosis Screening: covered every 2 years if you meet one of the following conditions: (1) Have a vertebral abnormality; (2) On glucocorticoid therapy for more than 3 months; (3) Have primary hyperparathyroidism; (4) On osteoporosis medications and need to assess response to drug therapy.  HIV Screening: covered annually if you're between the age of 15-65. Also covered annually if you are younger than 15 and older than 65 with risk factors for HIV infection. For pregnant patients, it is covered up to 3 times per pregnancy.    Immunizations:  Immunization Recommendations   Influenza Vaccine Annual influenza vaccination during flu season is recommended for all persons aged >= 6 months who do not have contraindications   Pneumococcal Vaccine   * Pneumococcal conjugate vaccine = PCV13 (Prevnar 13), PCV15 (Vaxneuvance), PCV20 (Prevnar 20)  * Pneumococcal polysaccharide vaccine =  PPSV23 (Pneumovax) Adults 19-63 yo with certain risk factors or if 65+ yo  If never received any pneumonia vaccine: recommend Prevnar 20 (PCV20)  Give PCV20 if previously received 1 dose of PCV13 or PPSV23   Hepatitis B Vaccine 3 dose series if at intermediate or high risk (ex: diabetes, end stage renal disease, liver disease)   Respiratory syncytial virus (RSV) Vaccine - COVERED BY MEDICARE PART D  * RSVPreF3 (Arexvy) CDC recommends that adults 60 years of age and older may receive a single dose of RSV vaccine using shared clinical decision-making (SCDM)   Tetanus (Td) Vaccine - COST NOT COVERED BY MEDICARE PART B Following completion of primary series, a booster dose should be given every 10 years to maintain immunity against tetanus. Td may also be given as tetanus wound prophylaxis.   Tdap Vaccine - COST NOT COVERED BY MEDICARE PART B Recommended at least once for all adults. For pregnant patients, recommended with each pregnancy.   Shingles Vaccine (Shingrix) - COST NOT COVERED BY MEDICARE PART B  2 shot series recommended in those 19 years and older who have or will have weakened immune systems or those 50 years and older     Health Maintenance Due:      Topic Date Due   • Hepatitis C Screening  Completed   • Lung Cancer Screening  Discontinued     Immunizations Due:      Topic Date Due   • COVID-19 Vaccine (2 - 2023-24 season) 09/01/2023     Advance Directives   What are advance directives?  Advance directives are legal documents that state your wishes and plans for medical care. These plans are made ahead of time in case you lose your ability to make decisions for yourself. Advance directives can apply to any medical decision, such as the treatments you want, and if you want to donate organs.   What are the types of advance directives?  There are many types of advance directives, and each state has rules about how to use them. You may choose a combination of any of the following:  Living will:  This is a  written record of the treatment you want. You can also choose which treatments you do not want, which to limit, and which to stop at a certain time. This includes surgery, medicine, IV fluid, and tube feedings.   Durable power of  for healthcare (DPAHC):  This is a written record that states who you want to make healthcare choices for you when you are unable to make them for yourself. This person, called a proxy, is usually a family member or a friend. You may choose more than 1 proxy.  Do not resuscitate (DNR) order:  A DNR order is used in case your heart stops beating or you stop breathing. It is a request not to have certain forms of treatment, such as CPR. A DNR order may be included in other types of advance directives.  Medical directive:  This covers the care that you want if you are in a coma, near death, or unable to make decisions for yourself. You can list the treatments you want for each condition. Treatment may include pain medicine, surgery, blood transfusions, dialysis, IV or tube feedings, and a ventilator (breathing machine).  Values history:  This document has questions about your views, beliefs, and how you feel and think about life. This information can help others choose the care that you would choose.  Why are advance directives important?  An advance directive helps you control your care. Although spoken wishes may be used, it is better to have your wishes written down. Spoken wishes can be misunderstood, or not followed. Treatments may be given even if you do not want them. An advance directive may make it easier for your family to make difficult choices about your care.       © Copyright [employer] 2018 Information is for End User's use only and may not be sold, redistributed or otherwise used for commercial purposes. All illustrations and images included in CareNotes® are the copyrighted property of [employer] or [employer]

## 2024-02-14 NOTE — PROGRESS NOTES
Assessment and Plan:     Problem List Items Addressed This Visit          Digestive    Smith esophagus  Also esophageal reflux disease and a Smith's esophagus is followed up by the GI       Respiratory    Chronic obstructive pulmonary disease (HCC)  COPD is a stable patient is followed up by the [facility] for further evaluation he had PFTs done and also noted       Other    Tobacco abuse  Patient quit smoking    Moderate protein-calorie malnutrition (HCC) - Primary  Patient did gain weight       Depression Screening and Follow-up Plan: Patient was screened for depression during today's encounter. They screened negative with a PHQ-2 score of 0.      Preventive health issues were discussed with patient, and age appropriate screening tests were ordered as noted in patient's After Visit Summary.  Personalized health advice and appropriate referrals for health education or preventive services given if needed, as noted in patient's After Visit Summary.     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 Negative for appetite change and fever.   HENT:  Negative for congestion, ear pain, hearing loss, nosebleeds, sneezing, tinnitus and voice change.    Eyes:  Negative for pain, discharge and redness.   Respiratory:  Positive for shortness of breath (getting worst). Negative for cough, chest tightness and wheezing.    Cardiovascular:  Negative for chest pain, palpitations and leg swelling.   Gastrointestinal:  Negative for abdominal pain, blood in stool, constipation, diarrhea, nausea and vomiting.   Genitourinary:  Negative for difficulty urinating, dysuria, hematuria and urgency.   Musculoskeletal:  Negative for arthralgias (Right hip pain), back pain, gait problem and joint swelling.   Skin:  Negative for rash and wound.   Allergic/Immunologic: Negative for environmental allergies.   Neurological:  Negative for dizziness, tremors, seizures, weakness, light-headedness and numbness.   Hematological:  Negative for adenopathy. Does not bruise/bleed easily.   Psychiatric/Behavioral:  Negative for behavioral problems and confusion. The patient is not nervous/anxious.         Problem List:     Patient Active Problem List   Diagnosis    Smith esophagus    Chronic obstructive pulmonary disease (HCC)    Erectile dysfunction    Tobacco abuse    Esophageal reflux    Lung nodules    Hiatal hernia    Elevated prostate specific antigen (PSA)    Prostate cancer (HCC)    Osteoarthritis of one hip, right    Moderate protein-calorie malnutrition (HCC)    Former smoker    Primary osteoarthritis of one hip, right      Past Medical and Surgical History:     Past Medical History:   Diagnosis Date    Arthritis     Smith's esophagus     Chronic cough     per pt since quit smoking less coughing-slightly yellow    Chronic pain disorder     hip    Colon polyp     COPD  "(chronic obstructive pulmonary disease) (HCC)     Dental bridge present     Esophageal reflux     GERD (gastroesophageal reflux disease)     History of kidney stones     History of radiation therapy     Inguinal hernia     \"had surgery\"    Moderate exercise     has [employer] business and enjoys remodeling properties    Rectal adenocarcinoma (HCC)     per pt not sure of this    Right hip pain     Shortness of breath     \"with activity not if sitting or sleeping\"    Shoulder pain     left--\"feels like cracking\"    Spermatocele     \"had surgery\"     Past Surgical History:   Procedure Laterality Date    CERVICAL FUSION      COLONOSCOPY      CT NEEDLE BIOPSY LUNG  2019    DENTAL IMPLANT      EGD      FL INJECTION RIGHT HIP (NON ARTHROGRAM)  2022    HERNIA REPAIR      INSERTION PROSTATE RADIATION SEED      SC ARTHRP ACETBLR/PROX FEM PROSTC AGRFT/ALGRFT Right 2023    Procedure: ARTHROPLASTY HIP TOTAL ANTERIOR,NAVIGATED;  Surgeon: Akbar Fenton DO;  Location:  MAIN OR;  Service: Orthopedics    SC INSJ MULTI-COMPONENT INFLATABLE PENILE PROSTH N/A 2016    Procedure: INSERTION OF THREE PIECE PENILE PROSTHESIS;  Surgeon: Davi Reddy MD;  Location: [facility];  Service: Urology    PROSTATE BIOPSY  2014    ROTATOR CUFF REPAIR Bilateral       Family History:     Family History   Problem Relation Age of Onset    No Known Problems Mother     No Known Problems Father       Social History:     Social History     Socioeconomic History    Marital status: Single     Spouse name: None    Number of children: None    Years of education: None    Highest education level: None   Occupational History    None   Tobacco Use    Smoking status: Former     Current packs/day: 0.00     Average packs/day: 0.5 packs/day for 68.0 years (34.0 ttl pk-yrs)     Types: Cigarettes     Start date:      Quit date: 2023     Years since quittin.1    Smokeless tobacco: Never   Vaping Use    Vaping status: Never " Used   Substance and Sexual Activity    Alcohol use: Yes     Alcohol/week: 14.0 standard drinks of alcohol     Types: 14 Standard drinks or equivalent per week     Comment: 2 michelle meyers and parveen per a day    Drug use: No    Sexual activity: None     Comment: defer   Other Topics Concern    None   Social History Narrative    None     Social Determinants of Health     Financial Resource Strain: Low Risk  (2/14/2024)    Overall Financial Resource Strain (CARDIA)     Difficulty of Paying Living Expenses: Not hard at all   Food Insecurity: No Food Insecurity (4/6/2023)    Hunger Vital Sign     Worried About Running Out of Food in the Last Year: Never true     Ran Out of Food in the Last Year: Never true   Transportation Needs: No Transportation Needs (2/14/2024)    PRAPARE - Transportation     Lack of Transportation (Medical): No     Lack of Transportation (Non-Medical): No   Physical Activity: Not on file   Stress: Not on file   Social Connections: Not on file   Intimate Partner Violence: Not on file   Housing Stability: Low Risk  (4/6/2023)    Housing Stability Vital Sign     Unable to Pay for Housing in the Last Year: No     Number of Places Lived in the Last Year: 1     Unstable Housing in the Last Year: No      Medications and Allergies:     Current Outpatient Medications   Medication Sig Dispense Refill    acetaminophen (TYLENOL) 500 mg tablet Take 2 tablets (1,000 mg total) by mouth every 8 (eight) hours 60 tablet 0    fluticasone-umeclidinium-vilanterol (Trelegy Ellipta) 100-62.5-25 mcg/actuation inhaler Inhale 1 puff daily Rinse mouth after use. 180 blister 1    fluticasone-umeclidinium-vilanterol (Trelegy Ellipta) 100-62.5-25 mcg/actuation inhaler Inhale 1 puff daily Rinse mouth after use. 180 blister 1    albuterol (Proventil HFA) 90 mcg/act inhaler Inhale 2 puffs every 6 (six) hours as needed for wheezing (Patient not taking: Reported on 2/14/2024) 6.7 g 5    cholecalciferol (VITAMIN D3) 1,000 units tablet  Take 2 tablets (2,000 Units total) by mouth daily (Patient not taking: Reported on 2/14/2024) 60 tablet 1     No current facility-administered medications for this visit.     Allergies   Allergen Reactions    Mobic [Meloxicam] Rash    Penicillins Hives and Other (See Comments)     Other reaction(s): facial swelling  Reaction Unknown      Immunizations:     Immunization History   Administered Date(s) Administered    COVID-19 MODERNA VACC 0.5 ML IM 10/27/2021    INFLUENZA 11/15/2022    Influenza, high dose seasonal 0.7 mL 11/15/2022, 01/23/2024    Influenza, seasonal, injectable 10/10/2011    Pneumococcal Conjugate 13-Valent 05/30/2017    Pneumococcal Polysaccharide PPV23 10/10/2011    Td (adult), adsorbed 04/01/2008      Health Maintenance:         Topic Date Due    Hepatitis C Screening  Completed    Lung Cancer Screening  Discontinued         Topic Date Due    COVID-19 Vaccine (2 - 2023-24 season) 09/01/2023      Medicare Screening Tests and Risk Assessments:     Richie is here for his Subsequent Wellness visit. Last Medicare Wellness visit information reviewed, patient interviewed and updates made to the record today.      Health Risk Assessment:   Patient rates overall health as good. Patient feels that their physical health rating is same. Patient is satisfied with their life. Eyesight was rated as same. Hearing was rated as same. Patient feels that their emotional and mental health rating is same. Patients states they are never, rarely angry. Patient states they are sometimes unusually tired/fatigued. Pain experienced in the last 7 days has been some. Patient's pain rating has been 5/10. Patient states that he has experienced weight loss or gain in last 6 months.     Depression Screening:   PHQ-2 Score: 0      Fall Risk Screening:   In the past year, patient has experienced: no history of falling in past year      Home Safety:  Patient does not have trouble with stairs inside or outside of their home. Patient  "has working smoke alarms and has working carbon monoxide detector. Home safety hazards include: none.     Nutrition:   Current diet is Frequent junk food and Regular.     Medications:   Patient is not currently taking any over-the-counter supplements. Patient is able to manage medications.     Activities of Daily Living (ADLs)/Instrumental Activities of Daily Living (IADLs):   Walk and transfer into and out of bed and chair?: Yes  Dress and groom yourself?: Yes    Bathe or shower yourself?: Yes    Feed yourself? Yes  Do your laundry/housekeeping?: Yes  Manage your money, pay your bills and track your expenses?: Yes  Make your own meals?: Yes    Do your own shopping?: Yes    Previous Hospitalizations:   Any hospitalizations or ED visits within the last 12 months?: Yes      Advance Care Planning:   Living will: Yes    Advanced directive: Yes    Advanced directive counseling given: Yes      Cognitive Screening:   Provider or family/friend/caregiver concerned regarding cognition?: No    PREVENTIVE SCREENINGS      Cardiovascular Screening:    General: Screening Current      Diabetes Screening:     General: Screening Current      Colorectal Cancer Screening:     General: History Colorectal Cancer      Prostate Cancer Screening:    General: History Prostate Cancer and Screening Not Indicated      Abdominal Aortic Aneurysm (AAA) Screening:    Risk factors include: tobacco use        Lung Cancer Screening:     General: Screening Not Indicated      Hepatitis C Screening:    General: Screening Current    Other Counseling Topics:   Calcium and vitamin D intake and regular weightbearing exercise.     No results found.     Physical Exam:     /70 (BP Location: Left arm, Patient Position: Sitting, Cuff Size: Standard)   Pulse 76   Temp 98 °F (36.7 °C) (Temporal)   Ht 5' 7.48\" (1.714 m)   Wt 58.1 kg (128 lb)   SpO2 96%   BMI 19.76 kg/m²     Physical Exam  Vitals and nursing note reviewed.   Constitutional:       General: " He is not in acute distress.     Appearance: He is well-developed.   HENT:      Head: Normocephalic and atraumatic.   Eyes:      Conjunctiva/sclera: Conjunctivae normal.   Cardiovascular:      Rate and Rhythm: Normal rate and regular rhythm.      Heart sounds: No murmur heard.  Pulmonary:      Effort: Pulmonary effort is normal. No respiratory distress.      Breath sounds: Decreased air movement present. Decreased breath sounds present.   Abdominal:      Palpations: Abdomen is soft.      Tenderness: There is no abdominal tenderness.   Musculoskeletal:         General: No swelling.      Cervical back: Neck supple.   Skin:     General: Skin is warm and dry.      Capillary Refill: Capillary refill takes less than 2 seconds.   Neurological:      Mental Status: He is alert.   Psychiatric:         Mood and Affect: Mood normal.          Indu Shepard MD

## 2024-02-15 ENCOUNTER — OFFICE VISIT (OUTPATIENT)
Dept: OBGYN CLINIC | Facility: MEDICAL CENTER | Age: 79
End: 2024-02-15
Payer: COMMERCIAL

## 2024-02-15 ENCOUNTER — APPOINTMENT (OUTPATIENT)
Dept: RADIOLOGY | Facility: MEDICAL CENTER | Age: 79
End: 2024-02-15
Payer: COMMERCIAL

## 2024-02-15 VITALS
SYSTOLIC BLOOD PRESSURE: 133 MMHG | BODY MASS INDEX: 20.25 KG/M2 | HEART RATE: 70 BPM | HEIGHT: 67 IN | WEIGHT: 129 LBS | DIASTOLIC BLOOD PRESSURE: 79 MMHG

## 2024-02-15 DIAGNOSIS — Z47.1 AFTERCARE FOLLOWING RIGHT HIP JOINT REPLACEMENT SURGERY: ICD-10-CM

## 2024-02-15 DIAGNOSIS — Z47.1 AFTERCARE FOLLOWING RIGHT HIP JOINT REPLACEMENT SURGERY: Primary | ICD-10-CM

## 2024-02-15 DIAGNOSIS — Z96.641 AFTERCARE FOLLOWING RIGHT HIP JOINT REPLACEMENT SURGERY: ICD-10-CM

## 2024-02-15 DIAGNOSIS — Z96.641 AFTERCARE FOLLOWING RIGHT HIP JOINT REPLACEMENT SURGERY: Primary | ICD-10-CM

## 2024-02-15 PROCEDURE — 73502 X-RAY EXAM HIP UNI 2-3 VIEWS: CPT

## 2024-02-15 PROCEDURE — 99213 OFFICE O/P EST LOW 20 MIN: CPT | Performed by: STUDENT IN AN ORGANIZED HEALTH CARE EDUCATION/TRAINING PROGRAM

## 2024-02-16 NOTE — PROGRESS NOTES
".[facility] New Office Note    Assessment:     1. Aftercare following right hip joint replacement surgery        Plan:     Problem List Items Addressed This Visit    None  Visit Diagnoses       Aftercare following right hip joint replacement surgery    -  Primary    Relevant Orders    XR hip/pelv 2-3 vws right if performed                Patient is doing excellent s/p right MANDO 4/5/23. He has returned to all ADLs without pain in the hip. Xrs demonstrate well-fixed total hip arthroplasty. Recommend routine surveillance with Xrs 2-3 years from now. Otherwise follow-up if needed sooner.      Subjective:     Patient ID: Richie Flowers is a 78 y.o. male.  Chief Complaint:  HPI:  The patient presents for routine follow-up of right hip. He is approximately 10 months s/p right MANDO and doing well. He has no groin pain. He did have an instance of right buttock pain consistent with lumbar radic.       Allergy:  Allergies   Allergen Reactions    Mobic [Meloxicam] Rash    Penicillins Hives and Other (See Comments)     Other reaction(s): facial swelling  Reaction Unknown     Medications:  all current active meds have been reviewed  Past Medical History:  Past Medical History:   Diagnosis Date    Arthritis     Smith's esophagus     Chronic cough     per pt since quit smoking less coughing-slightly yellow    Chronic pain disorder     hip    Colon polyp     COPD (chronic obstructive pulmonary disease) (HCC)     Dental bridge present     Esophageal reflux     GERD (gastroesophageal reflux disease)     History of kidney stones     History of radiation therapy     Inguinal hernia     \"had surgery\"    Moderate exercise     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    Rectal adenocarcinoma (HCC)     per pt not sure of this    Right hip pain     Shortness of breath     \"with activity not if sitting or sleeping\"    Shoulder pain     left--\"feels like cracking\"    Spermatocele     \"had surgery\"     Past Surgical " History:  Past Surgical History:   Procedure Laterality Date    CERVICAL FUSION      COLONOSCOPY      CT NEEDLE BIOPSY LUNG  2019    DENTAL IMPLANT      EGD      FL INJECTION RIGHT HIP (NON ARTHROGRAM)  2022    HERNIA REPAIR      INSERTION PROSTATE RADIATION SEED      WI ARTHRP ACETBLR/PROX FEM PROSTC AGRFT/ALGRFT Right 2023    Procedure: ARTHROPLASTY HIP TOTAL ANTERIOR,NAVIGATED;  Surgeon: Akbar Fenton DO;  Location:  MAIN OR;  Service: Orthopedics    WI INSJ MULTI-COMPONENT INFLATABLE PENILE PROSTH N/A 2016    Procedure: INSERTION OF THREE PIECE PENILE PROSTHESIS;  Surgeon: Davi Reddy MD;  Location:  MAIN OR;  Service: Urology    PROSTATE BIOPSY  2014    ROTATOR CUFF REPAIR Bilateral      Family History:  Family History   Problem Relation Age of Onset    No Known Problems Mother     No Known Problems Father      Social History:  Social History     Substance and Sexual Activity   Alcohol Use Yes    Alcohol/week: 14.0 standard drinks of alcohol    Types: 14 Standard drinks or equivalent per week    Comment: 2 michelle meyers and coke per a day     Social History     Substance and Sexual Activity   Drug Use No     Social History     Tobacco Use   Smoking Status Former    Current packs/day: 0.00    Average packs/day: 0.5 packs/day for 68.0 years (34.0 ttl pk-yrs)    Types: Cigarettes    Start date:     Quit date: 2023    Years since quittin.1   Smokeless Tobacco Never           ROS:  General: Per HPI  Skin: Negative, except if noted below  HEENT: Negative  Respiratory: Negative  Cardiovascular: Negative  Gastrointestinal: Negative  Urinary: Negative  Vascular: Negative  Musculoskeletal: Positive per HPI   Neurologic: Positive per HPI  Endocrine: Negative    Objective:  BP Readings from Last 1 Encounters:   02/15/24 133/79      Wt Readings from Last 1 Encounters:   02/15/24 58.5 kg (129 lb)        Respiratory:   non-labored respirations    Lymphatics:  no palpable  "lymph nodes    Gait and Station:   antalgic    Neurologic:   Alert and oriented times 3  Patient with normal sensation except as noted below  Deep tendon reflexes 2+ except as noted in MSK exam    Bilateral Lower Extremity:  Right Hip     Inspection: skin intact    Range of Motion: significantly limited with pain    - log roll    - SLR    Motor: 5/5 IP/Q/HS/TA/GS    Pulses: 2+ DP / 2+ PT    SILT DP/SP/S/S/TN    Imaging:  My interpretation XR AP pelvis/ Right hip: s/p press-fit MANDO, components in good position and well-fixed.     BMI:   Estimated body mass index is 19.92 kg/m² as calculated from the following:    Height as of this encounter: 5' 7.48\" (1.714 m).    Weight as of this encounter: 58.5 kg (129 lb).  BSA:   Estimated body surface area is 1.69 meters squared as calculated from the following:    Height as of this encounter: 5' 7.48\" (1.714 m).    Weight as of this encounter: 58.5 kg (129 lb).           Scribe Attestation      I,:   am acting as a scribe while in the presence of the attending physician.:       I,:   personally performed the services described in this documentation    as scribed in my presence.:           "

## 2024-03-08 ENCOUNTER — CLINICAL SUPPORT (OUTPATIENT)
Dept: PULMONOLOGY | Age: 79
End: 2024-03-08
Payer: COMMERCIAL

## 2024-03-08 DIAGNOSIS — J44.9 CHRONIC OBSTRUCTIVE PULMONARY DISEASE, UNSPECIFIED COPD TYPE (HCC): Primary | ICD-10-CM

## 2024-03-08 PROCEDURE — 94625 PHY/QHP OP PULM RHB W/O MNTR: CPT

## 2024-03-08 NOTE — PROGRESS NOTES
"PULMONARY REHAB ASSESSMENT      Today's date: 2024  Patient name: Richie Flowers     : 1945       MRN: [id number]  PCP: Indu Shepard MD  Referring Physician: Genia Dorado DO  Pulmonologist: ***    Dx: No diagnosis found.      Date of onset: ***  Cultural needs: ***    Weight:    Wt Readings from Last 1 Encounters:   02/15/24 58.5 kg (129 lb)      Height:   Ht Readings from Last 1 Encounters:   02/15/24 5' 7.48\" (1.714 m)       Medical History:   Past Medical History:   Diagnosis Date    Arthritis     Smith's esophagus     Chronic cough     per pt since quit smoking less coughing-slightly yellow    Chronic pain disorder     hip    Colon polyp     COPD (chronic obstructive pulmonary disease) (HCC)     Dental bridge present     Esophageal reflux     GERD (gastroesophageal reflux disease)     History of kidney stones     History of radiation therapy     Inguinal hernia     \"had surgery\"    Moderate exercise     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    Rectal adenocarcinoma (HCC)     per pt not sure of this    Right hip pain     Shortness of breath     \"with activity not if sitting or sleeping\"    Shoulder pain     left--\"feels like cracking\"    Spermatocele     \"had surgery\"       Family History:  Family History   Problem Relation Age of Onset    No Known Problems Mother     No Known Problems Father        Allergies:   Mobic [meloxicam] and Penicillins    ETOH:   Social History     Substance and Sexual Activity   Alcohol Use Yes    Alcohol/week: 14.0 standard drinks of alcohol    Types: 14 Standard drinks or equivalent per week    Comment: 2 michelle meyers and coke per a day       Current Medications:   Current Outpatient Medications   Medication Sig Dispense Refill    acetaminophen (TYLENOL) 500 mg tablet Take 2 tablets (1,000 mg total) by mouth every 8 (eight) hours 60 tablet 0    fluticasone-umeclidinium-vilanterol (Trelegy Ellipta) 100-62.5-25 mcg/actuation inhaler Inhale 1 " puff daily Rinse mouth after use. 180 blister 1    fluticasone-umeclidinium-vilanterol (Trelegy Ellipta) 100-62.5-25 mcg/actuation inhaler Inhale 1 puff daily Rinse mouth after use. 180 blister 1    psyllium (METAMUCIL) 0.52 g capsule Take 1 capsule (0.52 g total) by mouth daily 30 capsule 3     No current facility-administered medications for this visit.         Physical Limitations: ***    Fall Risk: {Fall Risk:30403}   Comments: {FALL RISK FOR CR/OK:86221}    Diagnostic Test:   PFT: Date: ***:  FEV1/FVC ***,  FEV1 of ***% predicted. suggestive of {COPD diagnosis:13464}{restriction/obstruction:13056}.    Oxygen needs:   Rest:  {Oxygenneeds:18831}  Exercise/physical activity:  {Oxygenneeds:44893}  Sleep:   {Oxygenneeds:45317}    Patient has Rx for supplemental O2:  {YES/NO:20200}    Rating of Perceived Dyspnea at rest:  ***/10    Does Pt monitor home SpO2? {YES/NO:20200}   Average SpO2 at rest:  ***%   Average SpO2 with ADLs/physical activity:  ***%      Use of Rescue Inhaler: {Breathing Treatments yes/no:85383}    Use of Maintenance Inhaler: {Breathing Treatments yes/no:49143}    Use of Nebulizer Treatments:  {Breathing Treatments yes/no:87738}    Patient practices breathing techniques at home:  {YES/NO:67474}    Pulmonary Disease Risk Factors:  {Toxic Exposure:26496}      CAD Risk Factors   Cholesterol: {Risk Factor yes/no:71202}  Smoking: {Tobacco Use:28128}  HTN: {Risk Factor yes/no:53939}  DM: {Diabetes history:72304}  Obesity: {Risk Factor yes/no:38804}   Inactivity: {Risk Factor yes/no:75534}  Stress:  perceived  stress: ***/10   Stressors:***   Goals for Stress Management: {GOALS FOR STRESS :04584}      Current Functional Status  Occupation: {Occupation:32035}  Recreation: ***  ADL’s:{functional status:68392}  Dixie: {functional status:77581}  Exercise: ***  Home exercise equipment: ***  Other: ***    Patient Specific Goals:     EXERCISE GOALS (home exercise, ADLs):   ***   NUTRITION GOALS (wt  management, diabetes management, dietary modifications):   ***   PSYCHOCOSOCIAL GOALS (stress, emotional well being, social support):   ***   CORE COMPONENT GOALS (smoking, BP control, medication):   ***     Oxygen Goals: Maintain SpO2>88% titrating supplemental oxygen as needed     Ability to reach goals/rehabilitation potential:  {Potential to Reach Goals:88274}    Projected return to function: {Return to function:37994}  Objective tests: {Objective tests:85144}      Nutritional   Reviewed details of Rate your Plate. Discussed key elements of heart healthy eating. Reviewed patient goals for dietary modifications and their clinical implications.  Reviewed most recent lipid profile.     Goals for dietary modification: {goals for RYP:38890}      Psychosocial Assessment as it relates to rehabilitation  Are there any aspects of the patient's family and home situation that will affect their rehabilitation?  {YES / NO:63795}    Assessment of depression and anxiety   {emotinal/social:55239}    Psychosocial Evaluation:   PHQ-9: ***  {PHQ9:09886}   CELSO-7: ***  {CELSO-7:66854}       Marital status: {CR Marital Status:67058}  Other Social Support: {SOCIAL SUPPORT NETWORK:63575}    Domestic Violence Screening: {1-5:7038536239}    Comments: ***

## 2024-03-08 NOTE — PROGRESS NOTES
"Pulmonary Rehabilitation Assessment and Individualized Treatment Plan  INITIAL     Today's date: 2024  Patient name: Richie Flowers     : 1945       MRN: [id number]  PCP: Indu Shepard MD  Referring Physician: Genia Dorado DO  Pulmonologist: DO Stephanie Garcia MD - [facility]     Provider: [place]  Clinician: Jennifer Correia MS, EP    Dx:  COPD  Date of onset: patient stated he has been dealing with this for 5 years, but is getting worse every month          ASSESSMENT      Weight:    Wt Readings from Last 1 Encounters:   02/15/24 58.5 kg (129 lb)      Height:   Ht Readings from Last 1 Encounters:   02/15/24 5' 7.48\" (1.714 m)       Medical History:   Past Medical History:   Diagnosis Date    Arthritis     Smith's esophagus     Chronic cough     per pt since quit smoking less coughing-slightly yellow    Chronic pain disorder     hip    Colon polyp     COPD (chronic obstructive pulmonary disease) (HCC)     Dental bridge present     Esophageal reflux     GERD (gastroesophageal reflux disease)     History of kidney stones     History of radiation therapy     Inguinal hernia     \"had surgery\"    Moderate exercise     has landscape business and enjoys remodeling properties    Rectal adenocarcinoma (HCC)     per pt not sure of this    Right hip pain     Shortness of breath     \"with activity not if sitting or sleeping\"    Shoulder pain     left--\"feels like cracking\"    Spermatocele     \"had surgery\"       Family History:  Family History   Problem Relation Age of Onset    No Known Problems Mother     No Known Problems Father        Allergies:   Mobic [meloxicam] and Penicillins    ETOH:   Social History     Substance and Sexual Activity   Alcohol Use Yes    Alcohol/week: 14.0 standard drinks of alcohol    Types: 14 Standard drinks or equivalent per week    Comment: 2 michelle meyers and coke per a day       Current Medications:   Current Outpatient Medications   Medication Sig " Dispense Refill    acetaminophen (TYLENOL) 500 mg tablet Take 2 tablets (1,000 mg total) by mouth every 8 (eight) hours 60 tablet 0    fluticasone-umeclidinium-vilanterol (Trelegy Ellipta) 100-62.5-25 mcg/actuation inhaler Inhale 1 puff daily Rinse mouth after use. 180 blister 1    fluticasone-umeclidinium-vilanterol (Trelegy Ellipta) 100-62.5-25 mcg/actuation inhaler Inhale 1 puff daily Rinse mouth after use. 180 blister 1    psyllium (METAMUCIL) 0.52 g capsule Take 1 capsule (0.52 g total) by mouth daily 30 capsule 3     No current facility-administered medications for this visit.       Physical Limitations: N/A, no physical limitations     Fall Risk: Low   Comments: Ambulates with a steady gait with no assist device and Denies a fall in the past 6 months    Oxygen needs:   Rest:  room air  Exercise/physical activity:  room air  Sleep:   room air  Does not want oxygen    Does Pt monitor home SpO2? no   Average SpO2 at rest:  %   Average SpO2 with ADLs/physical activity:  %      Use of Rescue Inhaler:  yes, once a day    Use of Maintenance Inhaler: No    Use of Nebulizer Treatments:  No    Patient practices breathing techniques at home:  No  Does have spirometer     Pulmonary Disease Risk Factors:  smoking  Quit smoking a year ago    CAD Risk Factors   Cholesterol: No  Smoking: Former user  HTN: Yes  DM: No  Obesity: No   Inactivity: Yes  Stress:  perceived  stress: 2/10   Stressors:current health/breathing situation    Goals for Stress Management: Practice Relaxation Techniques, Spend time outside, and Enjoy a hobby      Current Functional Status  Occupation: retired  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, flips, and sells  Also drives truck for [employer]   Recreation: see above  ADL’s:Capable of performing light to moderate ADLs able to perform self-care resumed driving lives alone  [place]: Capable of performing light to moderate ADLs able to perform self-care resumed driving lives alone  Exercise:  N/A  Home exercise equipment: No   Other: worked at [employer] for 30 years     Patient Specific Goals:     EXERCISE GOALS (home exercise, ADLs):   Decrease SOB, increase strength    NUTRITION GOALS (wt management, diabetes management, dietary modifications):   Gain healthy weight    PSYCHOCOSOCIAL GOALS (stress, emotional well being, social support):    Worry less with jobs   CORE COMPONENT GOALS (smoking, BP control, medication):   Take medication as needed, pt does not like to take any  medication that is prescibed      Ability to reach goals/rehabilitation potential:  Very Good     Projected return to function: 8-12 weeks  Objective tests: 6 MWT    Cultural needs: N/A    Comments:             INDIVIDUALIZED TREATMENT PLAN        SUMMARY OF PROGRESS:  Today is Richie's initial evaluation to begin Pulmonary Rehab for the diagnosis of COPD. Patient does have a PFT on file, revealing an FEV1/FVC ratio of 30% and an FEV1 of 36% predicted. The patient has been experiencing increased dyspnea, increased sitting time, and increased fatigue.  They report dyspnea, weakness, and fatigue when completing ADLs . The patient currently does not follow a home exercise program. Pt does not participate in any structured exercise. 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 Depression screening using the PHQ-9 interprets the patient's score of 13 10-14 = Moderate Depression. Anxiety screening using the CELSO-7 interprets the patients score of 0  0-4  = Not anxious. When addressed, the patient denies having depression/anxiety. Patient reports excellent social/emotional support from friends, work friends.  Information to begin using [employer] was provided as well as contact information for counseling through  [facility].  PHQ-9 score will be reassessed in 30 days.The patient is a  former smoker, quit 2023 . He has abstained since quitting and gets the urge every  now and again  .Patient admits to 100% medication compliance.    At rest, the patient rated dyspnea 0/10 with SpO2 92% on room air.  They completed an initial 6MWT, walking 680 ft on room air. The patient’s rating of perceived dyspnea during the 6MWT was 4/10 with SpO2 89-93%.  Patient took 1 rest breaks. Telemetry revealed NSR.   Resting  /68 with appropriate hemodynamic response to exercise reaching 136/76.  Patient will exercise on room air. Group and individualized education will be provided on smoking cessation, oxygen use, breathing techniques, pulmonary anatomy, exercise for the pulmonary patient, healthy eating, stress, and relaxation.  Patient specific goals include: stated he does not have many goals besides decreasing SOB, gaining weight.  Richie will attend 24 exercise sessions beginning 3/12/24.  Will progress patient as tolerated over the next 30 days.  See outlined plan of care below for specific patient goals in each component of care.         Medication compliance: Yes   Comments: Pt reports to be compliant with medications      Assessment of progression of lung disease and functional status:  CAT: 16/40  Shortness of breath questionnaire: 86/120      EXERCISE ASSESSMENT and PLAN    Current Exercise Program in Rehab:       Frequency: 2 days/week   Supplement with home exercise 2+ days/wk as tolerated        Minutes: 20-40         METS: 2.0-2.5              SpO2: >88%              RPD: 4-6                      HR: 50-85% HRR or RHR +30-40bpm:    RPE: 4-5         Modalities: Treadmill, UBE, and NuStep      Exercise Progression 30 Day Goals :    Frequency: 2 days/week    Supplement with home exercise 2+ days/wk as tolerated       Minutes: 30-40        METS: 2.5-3.0              SpO2: >88%              RPD: 4-6                      HR: 20-30 beats above RHR    RPE: 4-5        Modalities: Treadmill, UBE, Lifecycle, NuStep, and Recumbent bike     Strength training:  Will be added following 2-3 weeks of monitored  "exercise sessions   Modalities: Leg Press, Chest Press, Pull Downs, Lateral Raise, and Arm Extension    Home Exercise: none    Education: pursed lipped breathing, diaphragmatic breathing, relaxation breathing, home exercise guidelines, benefits of exercise for pulmonary disease, RPE scale, and RPD scale    SMART Goals:   reduced score on CAT, improved 6MWT distance, reduced dyspnea during exercise, and SpO2 >88% during exercise    Patient Specific EXERCISE GOALS:   (home exercise, ADLs): attend pulmonary rehab regularly, start a walking program, begin a consistent exercise regimen , increased muscular strength, increased energy, and increased stamina with ADLs    Patient's progress toward SMART and personal goals: pt agreeable to try walking at home and going up and down stairs to get exercise    Patient specific plan for next 30 days: walk at least 15 minutes daily as tolerated when he is not working     Plan: Titrate supplemental oxygen as needed to maintain SpO2>88% with exercise, learn to conserve energy with ADLs , practice diaphragmatic breathing, increased strength of respiratory muscles, and utilize PLB with physical activity    Readiness to change: Preparation:  (Getting ready to change)       NUTRITION ASSESSMENT AND PLAN    Weight control:    Starting weight: 124   Current weight:       Diabetes: N/A  A1c:     last measured:     SMART Goals:   eat whole grain breads, brown rice and whole grain cereals, eat 5 or more servings of fruits and vegetables a day, eat red meat once a week or less, choose lean beef or rarely eat beef, eat poultry without the skin, eat chicken and fish that is not fried, eat meatless meals twice a week or more, rarely eat frozen desserts or choose fruit or fat-free sweets, Do not cook with oil, butter or margarine, Do not eat fried foods, use \"light\" tub margarine on bread, potatoes and vegetables, choose healthy snacks such as fruit, pretzels, and low fat crackers, choose healthy " "desserts and sweets such as carole food cake or  fruit, choose low sodium canned, frozen/packaged foods or rarely/never eat, and rarely/never eat salty snacks      Patient Specific NUTRITION GOALS:  (wt control, diabetes management, dietary modifications): increase water intake to reduce/thin mucus production reduced SOB while eating improve hydration weight gain increased muscle mass    Patient's progress toward SMART and personal goals: pt encouraged to eat out less and cook more at home, patient enjoys cooking but does not have an appetite ever    Patient specific plan for next 30 days: choose foods healthier in fat for a healthy weight gain, discussed this during eval, cut back on drinking      Plan: group class: Reading Food Labels, replace refined flours with whole grains, increase daily intake of fruits and vegetables, limit meat intake to less than 6oz per day, choose healthy meals while dining out, reduce intake and /or  rarely eat processed meats , never/rarely eat fried foods, use \"light\" tub margarine, eat healthy snacks like fruit, pretzels, and low fat crackers, never/rarely add salt to food when cooking or at the table, choose low sugar desserts and sweets, drink less than 8oz of non-diet soda, punch etc. per day, and drink no more than 1-2 alcoholic drinks in a day    Measurable goals were based Rate Your Plate Dietary Self-Assessment. These are the areas in which the patient could score higher on the assessment.  Goals include recommendations for a heart healthy diet based on American Heart Association.    Education: low sodium diet  maintaining hydration  healthy choices while dining out  portion control  group class:  Label Reading  group class: healthy eating for managing pulmonary illness    Readiness to change: Preparation:  (Getting ready to change)       PSYCHOSOCIAL ASSESSMENT AND PLAN    Emotional:  Depression assessment:  PHQ-9 = 13  10-14 = Moderate Depression            Anxiety measure:  " CELSO-7 = 0  0-4  = Not anxious    Assessment of depression and anxiety    Patient reports they are coping well with good social support and denies depression or anxiety    Self-reported stress level:  2  Stress Management: Practice Relaxation Techniques, Exercise, and Enjoy a hobby    Patient's rating of Social support: Very Good   Social Support Network: friends and patient does not have close relationships for emotional/social support    Psychosocial Assessment as it relates to rehabilitation: Patient denies issues with his/her family or home life that may affect their rehabilitation efforts.       SMART Goals:    Physical Fitness in [place] Score < 3, Social Support in [place] Score < 3, Daily Activity in [place] Score < 3, Social Activities in [place] Score < 3, Overall Health in [place] Score < 3, Quality of Life in [place] Score < 3 , Change in Health in [place] Score < 3 , feel less tired with more energy, Improved appetite control, take time to relax, and reduced irritability    Patient Specific PSYCHOCOSOCIAL GOALS:  (stress, emotional well being, social support):  improve relationship with family, not to stress out as much at work getting jobs done on time    Patient's progress toward SMART and personal goals: encouraged to take time to oneself     Patient specific plan for next 30 days: ask for more social support from family     Plan: Class: Stress and Your Health, Class: Relaxation, Exercise, Spend time outdoors, and Keep a positive mindset    Education: benefits of a positive support system, stress management techniques, class:  Stress and Your Health , and class:  Stress and Pulmonary Disease    Readiness to change: Preparation:  (Getting ready to change)       OTHER CORE COMPONENTS     Tobacco:   Social History     Tobacco Use   Smoking Status Former    Current packs/day: 0.00    Average packs/day: 0.5 packs/day for 68.0 years (34.0 ttl pk-yrs)    Types: Cigarettes    Start date: 1955     Quit date: 2023    Years since quittin.1   Smokeless Tobacco Never       Tobacco Use Intervention: Referral to tobacco expert:   N/A: Pt has a remote history of smoking    Blood pressure:    Restin/68   Exercise:     Oxygen Goal: Maintain SpO2>88% during exercise or as advised by pulmonolgist    SMART Goals: reduced dietary sodium <2000mg, medication compliance, Abstain from smoking, and monitor home pulse oximetry    Patient Specific CORE COMPONENT GOALS (smoking, BP control, angina control, medication compliance): reduced dietary sodium <2000mg and medication compliance    Patient's progress toward SMART and personal goals: monitor pulse ox at home     Patient specific plan for next 30 days: take medication as doctor prescribed, does not like taking meds     Plan: avoid processed foods, engage in regular exercise, use salt substitutes, group class: Pulmonary Anatomy and Physiology, and group class:  Pulmonary Medications    Education:  pathophysiology of pulmonary disease, inspiratory muscle training, environmental triggers, traveling with pulmonary disease, education: Pulmonary Anatomy and Physiology, and education:  Pulmonary Medications    Readiness to change: Preparation:  (Getting ready to change)

## 2024-03-12 ENCOUNTER — CLINICAL SUPPORT (OUTPATIENT)
Dept: PULMONOLOGY | Age: 79
End: 2024-03-12
Payer: COMMERCIAL

## 2024-03-12 DIAGNOSIS — J44.9 CHRONIC OBSTRUCTIVE PULMONARY DISEASE, UNSPECIFIED COPD TYPE (HCC): Primary | ICD-10-CM

## 2024-03-12 PROCEDURE — 94625 PHY/QHP OP PULM RHB W/O MNTR: CPT

## 2024-03-14 ENCOUNTER — CLINICAL SUPPORT (OUTPATIENT)
Dept: PULMONOLOGY | Age: 79
End: 2024-03-14
Payer: COMMERCIAL

## 2024-03-14 DIAGNOSIS — J44.9 CHRONIC OBSTRUCTIVE PULMONARY DISEASE, UNSPECIFIED COPD TYPE (HCC): Primary | ICD-10-CM

## 2024-03-14 PROCEDURE — 94625 PHY/QHP OP PULM RHB W/O MNTR: CPT

## 2024-03-19 ENCOUNTER — CLINICAL SUPPORT (OUTPATIENT)
Dept: PULMONOLOGY | Age: 79
End: 2024-03-19
Payer: COMMERCIAL

## 2024-03-19 DIAGNOSIS — J44.9 CHRONIC OBSTRUCTIVE PULMONARY DISEASE, UNSPECIFIED COPD TYPE (HCC): Primary | ICD-10-CM

## 2024-03-19 PROCEDURE — 94625 PHY/QHP OP PULM RHB W/O MNTR: CPT

## 2024-03-21 ENCOUNTER — CLINICAL SUPPORT (OUTPATIENT)
Dept: PULMONOLOGY | Age: 79
End: 2024-03-21
Payer: COMMERCIAL

## 2024-03-21 DIAGNOSIS — J44.9 CHRONIC OBSTRUCTIVE PULMONARY DISEASE, UNSPECIFIED COPD TYPE (HCC): Primary | ICD-10-CM

## 2024-03-21 PROCEDURE — 94625 PHY/QHP OP PULM RHB W/O MNTR: CPT

## 2024-03-26 ENCOUNTER — CLINICAL SUPPORT (OUTPATIENT)
Dept: PULMONOLOGY | Age: 79
End: 2024-03-26
Payer: COMMERCIAL

## 2024-03-26 DIAGNOSIS — J44.9 CHRONIC OBSTRUCTIVE PULMONARY DISEASE, UNSPECIFIED COPD TYPE (HCC): Primary | ICD-10-CM

## 2024-03-26 PROCEDURE — 94625 PHY/QHP OP PULM RHB W/O MNTR: CPT

## 2024-03-28 ENCOUNTER — CLINICAL SUPPORT (OUTPATIENT)
Dept: PULMONOLOGY | Age: 79
End: 2024-03-28
Payer: COMMERCIAL

## 2024-03-28 DIAGNOSIS — J44.9 CHRONIC OBSTRUCTIVE PULMONARY DISEASE, UNSPECIFIED COPD TYPE (HCC): Primary | ICD-10-CM

## 2024-03-28 PROCEDURE — 94625 PHY/QHP OP PULM RHB W/O MNTR: CPT

## 2024-04-02 ENCOUNTER — TELEPHONE (OUTPATIENT)
Age: 79
End: 2024-04-02

## 2024-04-02 ENCOUNTER — CLINICAL SUPPORT (OUTPATIENT)
Dept: PULMONOLOGY | Age: 79
End: 2024-04-02
Payer: COMMERCIAL

## 2024-04-02 DIAGNOSIS — Z96.641 AFTERCARE FOLLOWING RIGHT HIP JOINT REPLACEMENT SURGERY: Primary | ICD-10-CM

## 2024-04-02 DIAGNOSIS — Z47.1 AFTERCARE FOLLOWING RIGHT HIP JOINT REPLACEMENT SURGERY: Primary | ICD-10-CM

## 2024-04-02 DIAGNOSIS — J44.9 CHRONIC OBSTRUCTIVE PULMONARY DISEASE, UNSPECIFIED COPD TYPE (HCC): Primary | ICD-10-CM

## 2024-04-02 PROCEDURE — 94625 PHY/QHP OP PULM RHB W/O MNTR: CPT

## 2024-04-02 RX ORDER — CLINDAMYCIN HYDROCHLORIDE 300 MG/1
CAPSULE ORAL
Qty: 2 CAPSULE | Refills: 5 | Status: SHIPPED | OUTPATIENT
Start: 2024-04-02 | End: 2024-05-02

## 2024-04-02 NOTE — TELEPHONE ENCOUNTER
Caller: Patient    Doctor: Dr. Fenton     Reason for call: Patient calling stating that he is scheduled for dental work tomorrow and would like an antibiotic sent to the pharmacy below.   Patient asking for call back when prescription is sent to pharmacy.      [place], [place] - [address] [place] [address] [phone]       Call back#: [phone]

## 2024-04-02 NOTE — PROGRESS NOTES
"Pulmonary Rehabilitation Assessment and Individualized Treatment Plan  30 DAY    Today's date: 2024  Patient name: Richie Flowers   # of exercise sessions: 8    : 1945       MRN: [id number]  PCP: Indu Shepard MD  Referring Physician: Genia Dorado DO  Pulmonologist: DO Stephanie Garcia MD - [facility]     Provider: Cristóbal  Clinician: Jennifer Correia MS, EP    Dx:  COPD  Date of onset: patient stated he has been dealing with this for 5 years, but is getting worse every month          ASSESSMENT      Weight:    Wt Readings from Last 1 Encounters:   02/15/24 58.5 kg (129 lb)      Height:   Ht Readings from Last 1 Encounters:   02/15/24 5' 7.48\" (1.714 m)       Medical History:   Past Medical History:   Diagnosis Date    Arthritis     Smith's esophagus     Chronic cough     per pt since quit smoking less coughing-slightly yellow    Chronic pain disorder     hip    Colon polyp     COPD (chronic obstructive pulmonary disease) (HCC)     Dental bridge present     Esophageal reflux     GERD (gastroesophageal reflux disease)     History of kidney stones     History of radiation therapy     Inguinal hernia     \"had surgery\"    Moderate exercise     has landscape business and enjoys remodeling properties    Rectal adenocarcinoma (HCC)     per pt not sure of this    Right hip pain     Shortness of breath     \"with activity not if sitting or sleeping\"    Shoulder pain     left--\"feels like cracking\"    Spermatocele     \"had surgery\"       Family History:  Family History   Problem Relation Age of Onset    No Known Problems Mother     No Known Problems Father        Allergies:   Mobic [meloxicam] and Penicillins    ETOH:   Social History     Substance and Sexual Activity   Alcohol Use Yes    Alcohol/week: 14.0 standard drinks of alcohol    Types: 14 Standard drinks or equivalent per week    Comment: 2 michelle mira and coke per a day       Current Medications:   Current Outpatient " Medications   Medication Sig Dispense Refill    acetaminophen (TYLENOL) 500 mg tablet Take 2 tablets (1,000 mg total) by mouth every 8 (eight) hours 60 tablet 0    fluticasone-umeclidinium-vilanterol (Trelegy Ellipta) 100-62.5-25 mcg/actuation inhaler Inhale 1 puff daily Rinse mouth after use. 180 blister 1    fluticasone-umeclidinium-vilanterol (Trelegy Ellipta) 100-62.5-25 mcg/actuation inhaler Inhale 1 puff daily Rinse mouth after use. 180 blister 1    psyllium (METAMUCIL) 0.52 g capsule Take 1 capsule (0.52 g total) by mouth daily 30 capsule 3     No current facility-administered medications for this visit.       Physical Limitations: N/A, no physical limitations     Fall Risk: Low   Comments: Ambulates with a steady gait with no assist device and Denies a fall in the past 6 months    Oxygen needs:   Rest:  room air  Exercise/physical activity:  room air  Sleep:   room air  Does not want oxygen    Does Pt monitor home SpO2? no   Average SpO2 at rest:  %   Average SpO2 with ADLs/physical activity:  %      Use of Rescue Inhaler:  yes, once a day    Use of Maintenance Inhaler: No    Use of Nebulizer Treatments:  No    Patient practices breathing techniques at home:  No  Does have spirometer     Pulmonary Disease Risk Factors:  smoking  Quit smoking a year ago    CAD Risk Factors   Cholesterol: No  Smoking: Former user  HTN: Yes  DM: No  Obesity: No   Inactivity: Yes  Stress:  perceived  stress: 2/10   Stressors:current health/breathing situation    Goals for Stress Management: Practice Relaxation Techniques, Spend time outside, and Enjoy a hobby      Current Functional Status  Occupation: retired  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, flips, and sells  Also drives truck for [employer]   Recreation: see above  ADL’s:Capable of performing light to moderate ADLs able to perform self-care resumed driving lives alone  [place]: Capable of performing light to moderate ADLs able to perform self-care resumed driving  lives alone  Exercise: N/A  Home exercise equipment: No   Other: worked at [employer] for 30 years     Patient Specific Goals:     EXERCISE GOALS (home exercise, ADLs):   Decrease SOB, increase strength    NUTRITION GOALS (wt management, diabetes management, dietary modifications):   Gain healthy weight    PSYCHOCOSOCIAL GOALS (stress, emotional well being, social support):    Worry less with jobs   CORE COMPONENT GOALS (smoking, BP control, medication):   Take medication as needed, pt does not like to take any  medication that is prescibed      Ability to reach goals/rehabilitation potential:  Very Good     Projected return to function: 8-12 weeks  Objective tests: 6 MWT    Cultural needs: N/A    Comments:             INDIVIDUALIZED TREATMENT PLAN        SUMMARY OF PROGRESS:    4/2: Richie has attended 7 exercise sessions in the past 30 days.   He tolerates 30-37 mins at 2.6 - 4.0 METs.  A light strength training component will be added in a future exercise session..   He is tolerating progression of intensity levels to maintain RPE 4-6.   Resting BP  102/62 - 130/62 with Normal response to exercise reaching 132/80- 148/70. Resting SpO2 94-90%.  RHR 60 - 72  with Normal response to exercise reaching 72 - 84.  EX [id number] He remains on room air. GUSTAFSON rating 4/10. Patient attends group educational classes on pulmonary risk factor modification.   His exercise program will be progressed as tolerated to maintain RPE 4-6.  They will continue to be educated on lifestyle modification and encouraged to supplement with a home exercise program as tolerated.      Medication compliance: Yes   Comments: Pt reports to be compliant with medications      Assessment of progression of lung disease and functional status:  CAT: 16/40  Shortness of breath questionnaire: 86/120      EXERCISE ASSESSMENT and PLAN    Current Exercise Program in Rehab:       Frequency: 2 days/week   Supplement with home exercise 2+ days/wk as tolerated         Minutes: 30-37         METS: 2.6-4.0              SpO2: >88%              RPD: 4-6                      HR: 50-85% HRR or RHR +30-40bpm:    RPE: 4-5         Modalities: Treadmill, UBE, NuStep, and Recumbent bike      Exercise Progression 30 Day Goals :    Frequency: 2 days/week    Supplement with home exercise 2+ days/wk as tolerated       Minutes: 35-38        METS: 2.7-4.5              SpO2: >88%              RPD: 4-6                      HR: 20-30 beats above RHR    RPE: 4-5        Modalities: Treadmill, UBE, Lifecycle, NuStep, and Recumbent bike     Strength training:  Will be added following 2-3 weeks of monitored exercise sessions   Modalities: Leg Press, Chest Press, Pull Downs, Lateral Raise, and Arm Extension    Home Exercise:  no structured home exercise but 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    Education: pursed lipped breathing, diaphragmatic breathing, relaxation breathing, home exercise guidelines, benefits of exercise for pulmonary disease, RPE scale, RPD scale, and education class: Exercise For The Pulmonary Patient    SMART Goals:   reduced score on CAT, improved 6MWT distance, reduced dyspnea during exercise, and SpO2 >88% during exercise    Patient Specific EXERCISE GOALS:   (home exercise, ADLs): attend pulmonary rehab regularly, start a walking program, begin a consistent exercise regimen , increased muscular strength, increased energy, and increased stamina with ADLs    Patient's progress toward SMART and personal goals: pt attends MD regularly, slight improvement of energy/strength/stamina    Patient specific plan for next 30 days: encouraged starting a home walking routine    Plan: Titrate supplemental oxygen as needed to maintain SpO2>88% with exercise, learn to conserve energy with ADLs , practice diaphragmatic breathing, increased strength of respiratory muscles, and utilize PLB with physical activity    Readiness to change: Contemplation:  (Acknowledging  "that there is a problem but not yet ready or sure of wanting to make a change) and Action:  (Changing behavior)      NUTRITION ASSESSMENT AND PLAN    Weight control:    Starting weight: 124   Current weight:   123    Diabetes: N/A  A1c:     last measured:     SMART Goals:   eat whole grain breads, brown rice and whole grain cereals, eat 5 or more servings of fruits and vegetables a day, eat red meat once a week or less, choose lean beef or rarely eat beef, eat poultry without the skin, eat chicken and fish that is not fried, eat meatless meals twice a week or more, rarely eat frozen desserts or choose fruit or fat-free sweets, Do not cook with oil, butter or margarine, Do not eat fried foods, use \"light\" tub margarine on bread, potatoes and vegetables, choose healthy snacks such as fruit, pretzels, and low fat crackers, choose healthy desserts and sweets such as carole food cake or  fruit, choose low sodium canned, frozen/packaged foods or rarely/never eat, and rarely/never eat salty snacks      Patient Specific NUTRITION GOALS:  (wt control, diabetes management, dietary modifications): increase water intake to reduce/thin mucus production reduced SOB while eating improve hydration weight gain increased muscle mass    Patient's progress toward SMART and personal goals: none - pt lost 1# in last 30 days    Patient specific plan for next 30 days: encouraged increasing hydration, provided handout on underweight diet/healthy weight gain, encouraged adding a daily smoothie    Plan: group class: Reading Food Labels, replace refined flours with whole grains, increase daily intake of fruits and vegetables, limit meat intake to less than 6oz per day, choose healthy meals while dining out, reduce intake and /or  rarely eat processed meats , never/rarely eat fried foods, use \"light\" tub margarine, eat healthy snacks like fruit, pretzels, and low fat crackers, never/rarely add salt to food when cooking or at the table, choose " low sugar desserts and sweets, drink less than 8oz of non-diet soda, punch etc. per day, and drink no more than 1-2 alcoholic drinks in a day    Measurable goals were based Rate Your Plate Dietary Self-Assessment. These are the areas in which the patient could score higher on the assessment.  Goals include recommendations for a heart healthy diet based on American Heart Association.    Education: low sodium diet  maintaining hydration  healthy choices while dining out  portion control  group class:  Label Reading  group class: healthy eating for managing pulmonary illness  EDU: Underweight Diet    Readiness to change: Preparation:  (Getting ready to change)       PSYCHOSOCIAL ASSESSMENT AND PLAN    Emotional:  Depression assessment:  PHQ-9 = 13  10-14 = Moderate Depression            Anxiety measure:  CELSO-7 = 0  0-4  = Not anxious    Assessment of depression and anxiety    Patient reports they are coping well with good social support and denies depression or anxiety    Self-reported stress level:  2  Stress Management: Practice Relaxation Techniques, Exercise, and Enjoy a hobby    Patient's rating of Social support: Very Good   Social Support Network: friends and patient does not have close relationships for emotional/social support    Psychosocial Assessment as it relates to rehabilitation: Patient denies issues with his/her family or home life that may affect their rehabilitation efforts.       SMART Goals:    Physical Fitness in [place] Score < 3, Social Support in [place] Score < 3, Daily Activity in [place] Score < 3, Social Activities in [place] Score < 3, Overall Health in [place] Score < 3, Quality of Life in [place] Score < 3 , Change in Health in [place] Score < 3 , feel less tired with more energy, Improved appetite control, take time to relax, and reduced irritability    Patient Specific PSYCHOCOSOCIAL GOALS:  (stress, emotional well being, social support):  improve relationship with family,  not to stress out as much at work getting jobs done on time    Patient's progress toward SMART and personal goals: encouraged to take time to oneself     Patient specific plan for next 30 days: ask for more social support from family     Plan: Class: Stress and Your Health, Class: Relaxation, Exercise, Spend time outdoors, and Keep a positive mindset    Education: benefits of a positive support system, stress management techniques, class:  Stress and Your Health , and class:  Stress and Pulmonary Disease    Readiness to change: Preparation:  (Getting ready to change)       OTHER CORE COMPONENTS     Tobacco:   Social History     Tobacco Use   Smoking Status Former    Current packs/day: 0.00    Average packs/day: 0.5 packs/day for 68.0 years (34.0 ttl pk-yrs)    Types: Cigarettes    Start date:     Quit date: 2023    Years since quittin.2   Smokeless Tobacco Never       Tobacco Use Intervention: Referral to tobacco expert:   N/A: Pt has a remote history of smoking    Blood pressure:    Restin//62   Exercise: 132//70    Oxygen Goal: Maintain SpO2>88% during exercise or as advised by pulmonolgist    SMART Goals: reduced dietary sodium <2000mg, medication compliance, Abstain from smoking, and monitor home pulse oximetry    Patient Specific CORE COMPONENT GOALS (smoking, BP control, angina control, medication compliance): reduced dietary sodium <2000mg and medication compliance    Patient's progress toward SMART and personal goals: monitor pulse ox at home, medication compliance    Patient specific plan for next 30 days: take medication as doctor prescribed, continue to monitor pulse ox at home, practice PLB     Plan: avoid processed foods, engage in regular exercise, use salt substitutes, group class: Pulmonary Anatomy and Physiology, and group class:  Pulmonary Medications    Education:  pathophysiology of pulmonary disease, inspiratory muscle training, environmental triggers,  bronchodilators, traveling with pulmonary disease, education: Pulmonary Anatomy and Physiology, education:  Pulmonary Medications, and EDU: Devices     Readiness to change: Action:  (Changing behavior)

## 2024-04-04 ENCOUNTER — CLINICAL SUPPORT (OUTPATIENT)
Dept: PULMONOLOGY | Age: 79
End: 2024-04-04
Payer: COMMERCIAL

## 2024-04-04 DIAGNOSIS — J44.9 CHRONIC OBSTRUCTIVE PULMONARY DISEASE, UNSPECIFIED COPD TYPE (HCC): Primary | ICD-10-CM

## 2024-04-04 PROCEDURE — 94625 PHY/QHP OP PULM RHB W/O MNTR: CPT

## 2024-04-09 ENCOUNTER — CLINICAL SUPPORT (OUTPATIENT)
Dept: PULMONOLOGY | Age: 79
End: 2024-04-09
Payer: COMMERCIAL

## 2024-04-09 DIAGNOSIS — J44.9 CHRONIC OBSTRUCTIVE PULMONARY DISEASE, UNSPECIFIED COPD TYPE (HCC): Primary | ICD-10-CM

## 2024-04-09 PROCEDURE — 94625 PHY/QHP OP PULM RHB W/O MNTR: CPT

## 2024-04-11 ENCOUNTER — CLINICAL SUPPORT (OUTPATIENT)
Dept: PULMONOLOGY | Age: 79
End: 2024-04-11
Payer: COMMERCIAL

## 2024-04-11 DIAGNOSIS — J44.9 CHRONIC OBSTRUCTIVE PULMONARY DISEASE, UNSPECIFIED COPD TYPE (HCC): Primary | ICD-10-CM

## 2024-04-11 PROCEDURE — 94625 PHY/QHP OP PULM RHB W/O MNTR: CPT

## 2024-04-16 ENCOUNTER — CLINICAL SUPPORT (OUTPATIENT)
Dept: PULMONOLOGY | Age: 79
End: 2024-04-16
Payer: COMMERCIAL

## 2024-04-16 DIAGNOSIS — J44.9 CHRONIC OBSTRUCTIVE PULMONARY DISEASE, UNSPECIFIED COPD TYPE (HCC): Primary | ICD-10-CM

## 2024-04-16 PROCEDURE — 94625 PHY/QHP OP PULM RHB W/O MNTR: CPT

## 2024-04-18 ENCOUNTER — CLINICAL SUPPORT (OUTPATIENT)
Dept: PULMONOLOGY | Age: 79
End: 2024-04-18
Payer: COMMERCIAL

## 2024-04-18 DIAGNOSIS — J44.9 CHRONIC OBSTRUCTIVE PULMONARY DISEASE, UNSPECIFIED COPD TYPE (HCC): Primary | ICD-10-CM

## 2024-04-18 PROCEDURE — 94625 PHY/QHP OP PULM RHB W/O MNTR: CPT

## 2024-04-23 ENCOUNTER — CLINICAL SUPPORT (OUTPATIENT)
Dept: PULMONOLOGY | Age: 79
End: 2024-04-23
Payer: COMMERCIAL

## 2024-04-23 DIAGNOSIS — J44.9 CHRONIC OBSTRUCTIVE PULMONARY DISEASE, UNSPECIFIED COPD TYPE (HCC): Primary | ICD-10-CM

## 2024-04-23 PROCEDURE — 94625 PHY/QHP OP PULM RHB W/O MNTR: CPT

## 2024-04-24 ENCOUNTER — OFFICE VISIT (OUTPATIENT)
Dept: PULMONOLOGY | Facility: CLINIC | Age: 79
End: 2024-04-24
Payer: COMMERCIAL

## 2024-04-24 VITALS
BODY MASS INDEX: 20.09 KG/M2 | HEIGHT: 67 IN | SYSTOLIC BLOOD PRESSURE: 110 MMHG | HEART RATE: 80 BPM | OXYGEN SATURATION: 96 % | TEMPERATURE: 97.3 F | DIASTOLIC BLOOD PRESSURE: 70 MMHG | RESPIRATION RATE: 18 BRPM | WEIGHT: 128 LBS

## 2024-04-24 DIAGNOSIS — J44.9 COPD, SEVERE (HCC): Primary | ICD-10-CM

## 2024-04-24 DIAGNOSIS — F17.211 CIGARETTE NICOTINE DEPENDENCE IN REMISSION: ICD-10-CM

## 2024-04-24 DIAGNOSIS — J96.11 CHRONIC RESPIRATORY FAILURE WITH HYPOXIA (HCC): ICD-10-CM

## 2024-04-24 LAB

## 2024-04-24 PROCEDURE — 94618 PULMONARY STRESS TESTING: CPT | Performed by: INTERNAL MEDICINE

## 2024-04-24 PROCEDURE — 99205 OFFICE O/P NEW HI 60 MIN: CPT | Performed by: INTERNAL MEDICINE

## 2024-04-24 RX ORDER — ALBUTEROL SULFATE 90 UG/1
2 AEROSOL, METERED RESPIRATORY (INHALATION) EVERY 4 HOURS PRN
COMMUNITY
Start: 2024-02-28 | End: 2025-02-22

## 2024-04-24 NOTE — ASSESSMENT & PLAN NOTE
Patient smoked up to half pack per day for over 60 years, quit in 2023.  He is up-to-date on lung cancer screening with most recent scan showing stable or decreasing size and pulmonary nodules

## 2024-04-24 NOTE — ASSESSMENT & PLAN NOTE
Patient demonstrated oxygen desaturation with ambulation today.  He qualified for supplemental O2 at 2 L/min with exertion.  We will send a prescription to [facility] for portable concentrator

## 2024-04-24 NOTE — PROGRESS NOTES
Pulmonary Outpatient Consultation Note   Richie Flowers 78 y.o. male MRN: [id number]  4/24/2024    Referring provider: Self-referred    Assessment/Plan:      COPD, severe (HCC)  Severe COPD with hyperinflation and remains symptomatic despite optimal medical therapy with Trelegy Ellipta.  He is currently enrolled in pulmonary rehabilitation.  He is interested in pursuing advanced therapies and appears to possibly be a candidate for bronchoscopic lung volume reduction based on preliminary testing.  We will move forward with updating his CT with [facility] protocol which will allow us to analyze emphysema destruction score and fissure completeness.  He also needs an echocardiogram to exclude pulmonary hypertension.  Finally, if he appears to have a target lobe, may need to complete workup with SPECT CT perfusion scan.  I will call him once testing is complete to determine next steps.  Patient is interested in moving forward.  I did have him sign paperwork that we would submit to his insurance company in the event he has a target lobe for treatment.    Chronic respiratory failure with hypoxia (HCC)  Patient demonstrated oxygen desaturation with ambulation today.  He qualified for supplemental O2 at 2 L/min with exertion.  We will send a prescription to [facility] for portable concentrator    Cigarette nicotine dependence in remission  Patient smoked up to half pack per day for over 60 years, quit in 2023.  He is up-to-date on lung cancer screening with most recent scan showing stable or decreasing size and pulmonary nodules      I spent 60 minutes with the patient counseling and coordinating care:  reviewing all available patient data related to procedure, personally reviewing imaging, studies and pulmonary function testing and discussing the procedure of bronchoscopic lung volume reduction.  I shared an informational presentation and reviewed data from the LIBERATE trial.  They understand that nearly 50% of patients  will have a 15% improvement in FEV1.  Patients will generally have improved exercise tolerance and quality of life related to COPD after treatment.  We also discussed the risks, notably a 30% risk of pneumothorax in the postprocedure period.  Additional risks include COPD exacerbation, pneumonia and increased supplemental oxygen needs. Patient will require a minimum of a 4 day / 3 night hospitalization to monitor for postprocedure complications. Patient also provided with literature and web site resource regarding [place] valve treatment      Visit orders:    Diagnoses and all orders for this visit:    COPD, severe (HCC)  -     POCT 6 minute walk  -     CT chest without contrast; Future  -     Echo complete w/ contrast if indicated; Future    Chronic respiratory failure with hypoxia (HCC)  -     Home Oxygen with Portability    Cigarette nicotine dependence in remission    Other orders  -     albuterol (PROVENTIL HFA,VENTOLIN HFA) 90 mcg/act inhaler; Inhale 2 puffs every 4 (four) hours as needed (Patient not taking: Reported on 4/24/2024)        History of Present Illness   HPI:  Richie Flowers is a 78 y.o. male who presents today to discuss advanced therapies for COPD.  Patient was initially diagnosed with COPD many years ago, most recently following with [facility].  His last visit with them was in February when they increased his Trelegy dose from 100 mcg to 200 mcg.  He did not notice much difference in terms of effect.  Patient is seeking care locally, as the commute to [place] is challenging for him.  He remains dyspneic with minimal exertion.  He has an occasional cough with white sputum production.  Over the past week or so, it has been more green in color.  No significant wheezing.  He is currently enrolled in pulmonary rehabilitation.  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  He also worked in [employer] for many years.   "No recent ER visits or hospitalizations related to COPD.    Review of Systems   Constitutional:  Negative for chills, fever and unexpected weight change.   HENT:  Negative for postnasal drip and sore throat.    Eyes:  Negative for visual disturbance.   Respiratory:          As noted in HPI   Cardiovascular:  Negative for chest pain.   Gastrointestinal:  Negative for abdominal pain, diarrhea and vomiting.   Musculoskeletal:  Negative for arthralgias.   Skin:  Negative for rash.   Neurological:  Negative for headaches.   Hematological:  Negative for adenopathy.   Psychiatric/Behavioral: Negative.     All other systems reviewed and are negative.        Historical Information   Past Medical History:   Diagnosis Date    Arthritis     Msith's esophagus     Chronic cough     per pt since quit smoking less coughing-slightly yellow    Chronic pain disorder     hip    Colon polyp     COPD (chronic obstructive pulmonary disease) (HCC)     Dental bridge present     Esophageal reflux     GERD (gastroesophageal reflux disease)     History of kidney stones     History of radiation therapy     Inguinal hernia     \"had surgery\"    Moderate exercise     has landscape business and enjoys remodeling properties    Rectal adenocarcinoma (HCC)     per pt not sure of this    Right hip pain     Shortness of breath     \"with activity not if sitting or sleeping\"    Shoulder pain     left--\"feels like cracking\"    Spermatocele     \"had surgery\"     Past Surgical History:   Procedure Laterality Date    CERVICAL FUSION      COLONOSCOPY      CT NEEDLE BIOPSY LUNG  07/09/2019    DENTAL IMPLANT      EGD      FL INJECTION RIGHT HIP (NON ARTHROGRAM)  11/25/2022    HERNIA REPAIR      INSERTION PROSTATE RADIATION SEED      RI ARTHRP ACETBLR/PROX FEM PROSTC AGRFT/ALGRFT Right 4/5/2023    Procedure: ARTHROPLASTY HIP TOTAL ANTERIOR,NAVIGATED;  Surgeon: Akbar Fenton DO;  Location:  MAIN OR;  Service: Orthopedics    RI INSJ MULTI-COMPONENT " "INFLATABLE PENILE PROSTH N/A 2016    Procedure: INSERTION OF THREE PIECE PENILE PROSTHESIS;  Surgeon: Davi Reddy MD;  Location: [facility];  Service: Urology    PROSTATE BIOPSY  2014    ROTATOR CUFF REPAIR Bilateral      Family History   Problem Relation Age of Onset    No Known Problems Mother     No Known Problems Father      Social History     Tobacco Use   Smoking Status Former    Current packs/day: 0.00    Average packs/day: 0.5 packs/day for 68.0 years (34.0 ttl pk-yrs)    Types: Cigarettes    Start date:     Quit date: 2023    Years since quittin.2   Smokeless Tobacco Never       Meds/Allergies     Current Outpatient Medications:     clindamycin (CLEOCIN) 300 MG capsule, Take 2 capsules by mouth 1 hour prior to dental procedure/cleaning, Disp: 2 capsule, Rfl: 5    fluticasone-umeclidinium-vilanterol (Trelegy Ellipta) 100-62.5-25 mcg/actuation inhaler, Inhale 1 puff daily Rinse mouth after use., Disp: 180 blister, Rfl: 1    psyllium (METAMUCIL) 0.52 g capsule, Take 1 capsule (0.52 g total) by mouth daily, Disp: 30 capsule, Rfl: 3    acetaminophen (TYLENOL) 500 mg tablet, Take 2 tablets (1,000 mg total) by mouth every 8 (eight) hours (Patient not taking: Reported on 2024), Disp: 60 tablet, Rfl: 0    albuterol (PROVENTIL HFA,VENTOLIN HFA) 90 mcg/act inhaler, Inhale 2 puffs every 4 (four) hours as needed (Patient not taking: Reported on 2024), Disp: , Rfl:     fluticasone-umeclidinium-vilanterol (Trelegy Ellipta) 100-62.5-25 mcg/actuation inhaler, Inhale 1 puff daily Rinse mouth after use., Disp: 180 blister, Rfl: 1  Allergies   Allergen Reactions    Mobic [Meloxicam] Rash    Penicillins Hives and Other (See Comments)     Other reaction(s): facial swelling  Reaction Unknown       Vitals: Blood pressure 110/70, pulse 80, temperature (!) 97.3 °F (36.3 °C), temperature source Tympanic, resp. rate 18, height 5' 7\" (1.702 m), weight 58.1 kg (128 lb), SpO2 96%., Body mass index is " "20.05 kg/m². Oxygen Therapy  SpO2: 96 %  Oxygen Therapy: None (Room air)    Physical Exam   Physical Exam  Constitutional:       General: He is not in acute distress.     Appearance: Normal appearance.   HENT:      Head: Normocephalic.      Mouth/Throat:      Pharynx: No oropharyngeal exudate.   Eyes:      General: No scleral icterus.  Neck:      Vascular: No JVD.   Cardiovascular:      Rate and Rhythm: Normal rate and regular rhythm.   Pulmonary:      Breath sounds: No wheezing, rhonchi or rales.   Abdominal:      Palpations: Abdomen is soft.      Tenderness: There is no abdominal tenderness.   Musculoskeletal:         General: No deformity.      Cervical back: Neck supple.   Lymphadenopathy:      Cervical: No cervical adenopathy.   Skin:     General: Skin is warm and dry.   Neurological:      Mental Status: He is alert and oriented to person, place, and time.   Psychiatric:         Mood and Affect: Mood normal.         Labs: I have personally reviewed pertinent lab results.  Lab Results   Component Value Date    WBC 7.78 07/12/2023    HGB 14.6 07/12/2023    HCT 45.3 07/12/2023    MCV 98 07/12/2023     07/12/2023     Lab Results   Component Value Date    CALCIUM 9.2 07/12/2023    K 4.5 07/12/2023    CO2 27 07/12/2023     (H) 07/12/2023    BUN 16 07/12/2023    CREATININE 0.82 07/12/2023     No results found for: \"IGE\"  Lab Results   Component Value Date    ALT 30 04/09/2023    AST 64 (H) 04/09/2023    ALKPHOS 75 04/09/2023       Imaging and other studies: I have personally reviewed pertinent reports.   and I have personally reviewed pertinent films in PACS  Chest CT - date 10/23/2023  Severe emphysema, multiple bilateral pulmonary nodules which have either decreased in size or remained stable    Pulmonary function testing:  Performed 10/23/2023  FEV1/FVC ratio 30%    FEV1 36% predicted  FVC 88% predicted  No response to bronchodilators  Post BD FEV1 33%, 0.90 L   % predicted   % " predicted  DLCO corrected for hemoglobin 27 % predicted  PFTs with severe obstruction, severe air trapping and hyperinflation, severe diffusion impairment    6WMT   Date 4/24/2024  Room air saturations at rest are 96%.  Patient ambulated for 6 minutes with saturations dropping to 86% at minute 4.  He was then placed on supplemental oxygen at 2 L/min to complete testing.  Saturations remained 94% or greater on 2 L.  Distance walked 270    Pulmonary Rehabilitation -ongoing    2D echocardiogram - none on file

## 2024-04-24 NOTE — ASSESSMENT & PLAN NOTE
Severe COPD with hyperinflation and remains symptomatic despite optimal medical therapy with Trelegy Ellipta.  He is currently enrolled in pulmonary rehabilitation.  He is interested in pursuing advanced therapies and appears to possibly be a candidate for bronchoscopic lung volume reduction based on preliminary testing.  We will move forward with updating his CT with Jud protocol which will allow us to analyze emphysema destruction score and fissure completeness.  He also needs an echocardiogram to exclude pulmonary hypertension.  Finally, if he appears to have a target lobe, may need to complete workup with SPECT CT perfusion scan.  I will call him once testing is complete to determine next steps.  Patient is interested in moving forward.  I did have him sign paperwork that we would submit to his insurance company in the event he has a target lobe for treatment.

## 2024-04-25 ENCOUNTER — CLINICAL SUPPORT (OUTPATIENT)
Dept: PULMONOLOGY | Age: 79
End: 2024-04-25
Payer: COMMERCIAL

## 2024-04-25 DIAGNOSIS — J44.9 CHRONIC OBSTRUCTIVE PULMONARY DISEASE, UNSPECIFIED COPD TYPE (HCC): Primary | ICD-10-CM

## 2024-04-25 PROCEDURE — 94625 PHY/QHP OP PULM RHB W/O MNTR: CPT

## 2024-04-30 ENCOUNTER — CLINICAL SUPPORT (OUTPATIENT)
Dept: PULMONOLOGY | Age: 79
End: 2024-04-30
Payer: COMMERCIAL

## 2024-04-30 DIAGNOSIS — J44.9 CHRONIC OBSTRUCTIVE PULMONARY DISEASE, UNSPECIFIED COPD TYPE (HCC): Primary | ICD-10-CM

## 2024-04-30 PROCEDURE — 94625 PHY/QHP OP PULM RHB W/O MNTR: CPT

## 2024-04-30 NOTE — PROGRESS NOTES
"Pulmonary Rehabilitation Assessment and Individualized Treatment Plan  60 DAY    Today's date: 2024  Patient name: Richie Flowers   # of exercise sessions: 16    : 1945       MRN: [id number]  PCP: Indu Shepard MD  Referring Physician: Genia Dorado DO  Pulmonologist: DO Stephanie Garcia MD - [facility]   Patient had a recent visit with Dr. Townsend about valve placements     Provider: Cristóbal  Clinician: Jennifer Correia MS, EP    Dx:  COPD  Date of onset: patient stated he has been dealing with this for 5 years, but is getting worse every month          ASSESSMENT      Weight:    Wt Readings from Last 1 Encounters:   24 58.1 kg (128 lb)      Height:   Ht Readings from Last 1 Encounters:   24 5' 7\" (1.702 m)       Medical History:   Past Medical History:   Diagnosis Date    Arthritis     Smith's esophagus     Chronic cough     per pt since quit smoking less coughing-slightly yellow    Chronic pain disorder     hip    Colon polyp     COPD (chronic obstructive pulmonary disease) (HCC)     Dental bridge present     Esophageal reflux     GERD (gastroesophageal reflux disease)     History of kidney stones     History of radiation therapy     Inguinal hernia     \"had surgery\"    Moderate exercise     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    Rectal adenocarcinoma (HCC)     per pt not sure of this    Right hip pain     Shortness of breath     \"with activity not if sitting or sleeping\"    Shoulder pain     left--\"feels like cracking\"    Spermatocele     \"had surgery\"       Family History:  Family History   Problem Relation Age of Onset    No Known Problems Mother     No Known Problems Father        Allergies:   Mobic [meloxicam] and Penicillins    ETOH:   Social History     Substance and Sexual Activity   Alcohol Use Yes    Alcohol/week: 14.0 standard drinks of alcohol    Types: 14 Standard drinks or equivalent per week    Comment: 2 michelle meyesr and " coke per a day       Current Medications:   Current Outpatient Medications   Medication Sig Dispense Refill    acetaminophen (TYLENOL) 500 mg tablet Take 2 tablets (1,000 mg total) by mouth every 8 (eight) hours (Patient not taking: Reported on 4/24/2024) 60 tablet 0    albuterol (PROVENTIL HFA,VENTOLIN HFA) 90 mcg/act inhaler Inhale 2 puffs every 4 (four) hours as needed (Patient not taking: Reported on 4/24/2024)      clindamycin (CLEOCIN) 300 MG capsule Take 2 capsules by mouth 1 hour prior to dental procedure/cleaning 2 capsule 5    fluticasone-umeclidinium-vilanterol (Trelegy Ellipta) 100-62.5-25 mcg/actuation inhaler Inhale 1 puff daily Rinse mouth after use. 180 blister 1    fluticasone-umeclidinium-vilanterol (Trelegy Ellipta) 100-62.5-25 mcg/actuation inhaler Inhale 1 puff daily Rinse mouth after use. 180 blister 1    psyllium (METAMUCIL) 0.52 g capsule Take 1 capsule (0.52 g total) by mouth daily 30 capsule 3     No current facility-administered medications for this visit.       Physical Limitations: N/A, no physical limitations     Fall Risk: Low   Comments: Ambulates with a steady gait with no assist device and Denies a fall in the past 6 months    Oxygen needs:   Rest:  room air  Exercise/physical activity:  room air  Sleep:   room air  Does not want oxygen    Does Pt monitor home SpO2? no   Average SpO2 at rest:  %   Average SpO2 with ADLs/physical activity:  %      Use of Rescue Inhaler:  yes, once a day    Use of Maintenance Inhaler: No    Use of Nebulizer Treatments:  No    Patient practices breathing techniques at home:  No  Does have spirometer     Pulmonary Disease Risk Factors:  smoking  Quit smoking a year ago    CAD Risk Factors   Cholesterol: No  Smoking: Former user  HTN: Yes  DM: No  Obesity: No   Inactivity: Yes  Stress:  perceived  stress: 2/10   Stressors:current health/breathing situation    Goals for Stress Management: Practice Relaxation Techniques, Spend time outside, and Enjoy a  andres      Current Functional Status  Occupation: retired  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, flips, and sells  Also drives truck for [employer]   Recreation: see above  ADL’s:Capable of performing light to moderate ADLs able to perform self-care resumed driving lives alone  [place]: Capable of performing light to moderate ADLs able to perform self-care resumed driving lives alone  Exercise: N/A  Home exercise equipment: No   Other: worked at [employer] for 30 years     Patient Specific Goals:     EXERCISE GOALS (home exercise, ADLs):   Decrease SOB, increase strength    NUTRITION GOALS (wt management, diabetes management, dietary modifications):   Gain healthy weight    PSYCHOCOSOCIAL GOALS (stress, emotional well being, social support):    Worry less with jobs   CORE COMPONENT GOALS (smoking, BP control, medication):   Take medication as needed, pt does not like to take any  medication that is prescibed      Ability to reach goals/rehabilitation potential:  Very Good     Projected return to function: 8-12 weeks  Objective tests: 6 MWT    Cultural needs: N/A    Comments:           INDIVIDUALIZED TREATMENT PLAN        SUMMARY OF PROGRESS:    4/30: Richie Flowers has attended 16 exercise sessions in the past 30 days.   He tolerates 40 mins at 3.9 - 4.0 METs.  A light strength training component has been added to their exercise program..   He is tolerating progression of intensity levels to maintain RPE 4-6.   Resting BP  120/64 - 132/76 with Normal response to exercise reaching 136/68- 140/80. Patient is exercising on room air and does not use oxygen at home. However, patient recently had visit with Dr. Townsend for a consultation about getting valve placements and she order him home oxygen to be used when needed. Patient has not been using it at home, feels he does not need it. He does not exert himself to need it while at home. 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 RHR 72 - 90  with Normal response to exercise reaching 90 - 98. Experiences moderate SOB during exercise.  Patient attends group educational classes on pulmonary disease.   His exercise program will be progressed as tolerated to maintain RPE 4-6.  They will continue to be educated on lifestyle modification and encouraged to supplement with a home exercise program as tolerated.    4/2: Richie has attended 7 exercise sessions in the past 30 days.   He tolerates 30-37 mins at 2.6 - 4.0 METs.  A light strength training component will be added in a future exercise session..   He is tolerating progression of intensity levels to maintain RPE 4-6.   Resting BP  102/62 - 130/62 with Normal response to exercise reaching 132/80- 148/70. Resting SpO2 94-90%.  RHR 60 - 72  with Normal response to exercise reaching 72 - 84.  EX [id number]%. He remains on room air. GUSTA[place]ON rating 4/10. Patient attends group educational classes on pulmonary risk factor modification.   His exercise program will be progressed as tolerated to maintain RPE 4-6.  They will continue to be educated on lifestyle modification and encouraged to supplement with a home exercise program as tolerated.      Medication compliance: Yes   Comments: Pt reports to be compliant with medications      Assessment of progression of lung disease and functional status:  CAT: 16/40  Shortness of breath questionnaire: 86/120      EXERCISE ASSESSMENT and PLAN    Current Exercise Program in Rehab:       Frequency: 2 days/week   Supplement with home exercise 2+ days/wk as tolerated        Minutes: 35-40         METS: 3.9-4.0              SpO2: >88%              RPD: 4-6                      HR: 50-85% HRR or RHR +30-40bpm:    RPE: 4-5         Modalities: Treadmill, UBE, NuStep, and Recumbent bike      Exercise Progression 30 Day Goals :    Frequency: 2 days/week    Supplement with home exercise 2+ days/wk as tolerated       Minutes: 35-38        METS: 3.0-4.5               SpO2: >88%              RPD: 4-6                      HR: 20-30 beats above RHR    RPE: 4-5        Modalities: Treadmill, UBE, Lifecycle, NuStep, and Recumbent bike     Strength trainin-3 days / week  12-15 repetitions   Modalities: Leg Press, Chest Press, Pull Downs, Lateral Raise, and Arm Extension    Home Exercise:  no structured home exercise but 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      Education: pursed lipped breathing, diaphragmatic breathing, relaxation breathing, home exercise guidelines, benefits of exercise for pulmonary disease, RPE scale, RPD scale, and education class: Exercise For The Pulmonary Patient    SMART Goals:   reduced score on CAT, improved 6MWT distance, reduced dyspnea during exercise, and SpO2 >88% during exercise    Patient Specific EXERCISE GOALS:   (home exercise, ADLs): attend pulmonary rehab regularly, start a walking program, begin a consistent exercise regimen , increased muscular strength, increased energy, and increased stamina with ADLs    Patient's progress toward SMART and personal goals: pt attends [place] regularly, slight improvement of energy/strength/stamina  Continues to complete project around the house, works in his shop, cutting grass, rental properties, no structured exercise    Patient specific plan for next 30 days: encouraged starting a home walking routine, use oxygen when needed     Plan: Titrate supplemental oxygen as needed to maintain SpO2>88% with exercise, learn to conserve energy with ADLs , practice diaphragmatic breathing, increased strength of respiratory muscles, and utilize PLB with physical activity    Readiness to change: Action:  (Changing behavior)      NUTRITION ASSESSMENT AND PLAN    Weight control:    Starting weight: 124   Current weight:   120    Diabetes: N/A  A1c:     last measured:     SMART Goals:   eat whole grain breads, brown rice and whole grain cereals, eat 5 or more servings of fruits and vegetables a  "day, eat red meat once a week or less, choose lean beef or rarely eat beef, eat poultry without the skin, eat chicken and fish that is not fried, eat meatless meals twice a week or more, rarely eat frozen desserts or choose fruit or fat-free sweets, Do not cook with oil, butter or margarine, Do not eat fried foods, use \"light\" tub margarine on bread, potatoes and vegetables, choose healthy snacks such as fruit, pretzels, and low fat crackers, choose healthy desserts and sweets such as carole food cake or  fruit, choose low sodium canned, frozen/packaged foods or rarely/never eat, and rarely/never eat salty snacks      Patient Specific NUTRITION GOALS:  (wt control, diabetes management, dietary modifications): increase water intake to reduce/thin mucus production reduced SOB while eating improve hydration weight gain increased muscle mass    Patient's progress toward SMART and personal goals: patient has been losing weekly    Patient specific plan for next 30 days: encouraged increasing hydration, provided handout on underweight diet/healthy weight gain, encouraged adding a daily smoothie  Patient stated he does not have an appetite, eating maybe 2 meals per day, encouraged to eat high protein meals to help gain weight     Plan: group class: Reading Food Labels, replace refined flours with whole grains, increase daily intake of fruits and vegetables, limit meat intake to less than 6oz per day, choose healthy meals while dining out, reduce intake and /or  rarely eat processed meats , never/rarely eat fried foods, use \"light\" tub margarine, eat healthy snacks like fruit, pretzels, and low fat crackers, never/rarely add salt to food when cooking or at the table, choose low sugar desserts and sweets, drink less than 8oz of non-diet soda, punch etc. per day, and drink no more than 1-2 alcoholic drinks in a day    Measurable goals were based Rate Your Plate Dietary Self-Assessment. These are the areas in which the patient " could score higher on the assessment.  Goals include recommendations for a heart healthy diet based on American Heart Association.    Education: low sodium diet  maintaining hydration  healthy choices while dining out  portion control  group class:  Label Reading  group class: healthy eating for managing pulmonary illness  EDU: Underweight Diet    Readiness to change: Preparation:  (Getting ready to change)  and Action:  (Changing behavior)      PSYCHOSOCIAL ASSESSMENT AND PLAN    Emotional:  Depression assessment:  PHQ-9 = 13  10-14 = Moderate Depression            Anxiety measure:  CELSO-7 = 0  0-4  = Not anxious    Assessment of depression and anxiety    Patient reports they are coping well with good social support and denies depression or anxiety    Self-reported stress level:  2  Stress Management: Practice Relaxation Techniques, Exercise, and Enjoy a hobby    Patient's rating of Social support: Very Good   Social Support Network: friends and patient does not have close relationships for emotional/social support    Psychosocial Assessment as it relates to rehabilitation: Patient denies issues with his/her family or home life that may affect their rehabilitation efforts.       SMART Goals:    Physical Fitness in [place] Score < 3, Social Support in [place] Score < 3, Daily Activity in [place] Score < 3, Social Activities in [place] Score < 3, Overall Health in [place] Score < 3, [facility] Score < 3 , Change in Health in [place] Score < 3 , feel less tired with more energy, Improved appetite control, take time to relax, and reduced irritability    Patient Specific PSYCHOCOSOCIAL GOALS:  (stress, emotional well being, social support):  improve relationship with family, not to stress out as much at work getting jobs done on time    Patient's progress toward SMART and personal goals: encouraged to take time to oneself   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     Patient specific  plan for next 30 days: ask for more social support from family     Plan: Class: Stress and Your Health, Class: Relaxation, Exercise, Spend time outdoors, and Keep a positive mindset    Education: benefits of a positive support system, stress management techniques, class:  Stress and Your Health , and class:  Stress and Pulmonary Disease    Readiness to change: Action:  (Changing behavior)      OTHER CORE COMPONENTS     Tobacco:   Social History     Tobacco Use   Smoking Status Former    Current packs/day: 0.00    Average packs/day: 0.5 packs/day for 68.0 years (34.0 ttl pk-yrs)    Types: Cigarettes    Start date:     Quit date: 2023    Years since quittin.3   Smokeless Tobacco Never       Tobacco Use Intervention: Referral to tobacco expert:   N/A: Pt has a remote history of smoking    Blood pressure:    Restin//76   Exercise: 136//80    Oxygen Goal: Maintain SpO2>88% during exercise or as advised by pulmonolgist    SMART Goals: reduced dietary sodium <2000mg, medication compliance, Abstain from smoking, and monitor home pulse oximetry    Patient Specific CORE COMPONENT GOALS (smoking, BP control, angina control, medication compliance): reduced dietary sodium <2000mg and medication compliance    Patient's progress toward SMART and personal goals: monitor pulse ox at home, medication compliance      Patient specific plan for next 30 days: take medication as doctor prescribed, continue to monitor pulse ox at home, practice PLB   Use oxygen when needed, 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

## 2024-05-02 ENCOUNTER — CLINICAL SUPPORT (OUTPATIENT)
Dept: PULMONOLOGY | Age: 79
End: 2024-05-02
Payer: COMMERCIAL

## 2024-05-02 DIAGNOSIS — J44.9 CHRONIC OBSTRUCTIVE PULMONARY DISEASE, UNSPECIFIED COPD TYPE (HCC): Primary | ICD-10-CM

## 2024-05-02 PROCEDURE — 94625 PHY/QHP OP PULM RHB W/O MNTR: CPT

## 2024-05-07 ENCOUNTER — CLINICAL SUPPORT (OUTPATIENT)
Dept: PULMONOLOGY | Age: 79
End: 2024-05-07
Payer: COMMERCIAL

## 2024-05-07 DIAGNOSIS — J44.9 CHRONIC OBSTRUCTIVE PULMONARY DISEASE, UNSPECIFIED COPD TYPE (HCC): Primary | ICD-10-CM

## 2024-05-07 PROCEDURE — 94625 PHY/QHP OP PULM RHB W/O MNTR: CPT

## 2024-05-09 ENCOUNTER — APPOINTMENT (OUTPATIENT)
Dept: PULMONOLOGY | Age: 79
End: 2024-05-09
Payer: COMMERCIAL

## 2024-05-14 ENCOUNTER — CLINICAL SUPPORT (OUTPATIENT)
Dept: PULMONOLOGY | Age: 79
End: 2024-05-14
Payer: COMMERCIAL

## 2024-05-14 DIAGNOSIS — J44.9 CHRONIC OBSTRUCTIVE PULMONARY DISEASE, UNSPECIFIED COPD TYPE (HCC): Primary | ICD-10-CM

## 2024-05-14 PROCEDURE — 94625 PHY/QHP OP PULM RHB W/O MNTR: CPT

## 2024-05-16 ENCOUNTER — CLINICAL SUPPORT (OUTPATIENT)
Dept: PULMONOLOGY | Age: 79
End: 2024-05-16
Payer: COMMERCIAL

## 2024-05-16 DIAGNOSIS — J44.9 CHRONIC OBSTRUCTIVE PULMONARY DISEASE, UNSPECIFIED COPD TYPE (HCC): Primary | ICD-10-CM

## 2024-05-16 PROCEDURE — 94625 PHY/QHP OP PULM RHB W/O MNTR: CPT

## 2024-05-22 ENCOUNTER — HOSPITAL ENCOUNTER (OUTPATIENT)
Dept: NON INVASIVE DIAGNOSTICS | Facility: HOSPITAL | Age: 79
Discharge: HOME | End: 2024-05-22
Attending: INTERNAL MEDICINE
Payer: COMMERCIAL

## 2024-05-22 ENCOUNTER — OFFICE VISIT (OUTPATIENT)
Dept: INTERNAL MEDICINE CLINIC | Age: 79
End: 2024-05-22
Payer: COMMERCIAL

## 2024-05-22 ENCOUNTER — HOSPITAL ENCOUNTER (OUTPATIENT)
Dept: RADIOLOGY | Facility: HOSPITAL | Age: 79
Discharge: HOME | End: 2024-05-22
Attending: INTERNAL MEDICINE
Payer: COMMERCIAL

## 2024-05-22 VITALS
DIASTOLIC BLOOD PRESSURE: 74 MMHG | TEMPERATURE: 98.7 F | BODY MASS INDEX: 19.36 KG/M2 | OXYGEN SATURATION: 92 % | HEART RATE: 67 BPM | SYSTOLIC BLOOD PRESSURE: 120 MMHG | WEIGHT: 123.6 LBS

## 2024-05-22 VITALS
WEIGHT: 128 LBS | HEIGHT: 67 IN | HEART RATE: 80 BPM | SYSTOLIC BLOOD PRESSURE: 110 MMHG | BODY MASS INDEX: 20.09 KG/M2 | DIASTOLIC BLOOD PRESSURE: 70 MMHG

## 2024-05-22 DIAGNOSIS — J43.9 PULMONARY EMPHYSEMA, UNSPECIFIED EMPHYSEMA TYPE (HCC): ICD-10-CM

## 2024-05-22 DIAGNOSIS — L81.9 PIGMENTED SKIN LESION OF UNCERTAIN BEHAVIOR OF HEAD: Primary | ICD-10-CM

## 2024-05-22 DIAGNOSIS — J44.9 COPD, SEVERE (HCC): ICD-10-CM

## 2024-05-22 LAB
AORTIC ROOT: 3 CM
AORTIC VALVE MEAN VELOCITY: 6 M/S
APICAL FOUR CHAMBER EJECTION FRACTION: 52 %
AV LVOT MEAN GRADIENT: 2 MMHG
AV LVOT PEAK GRADIENT: 3 MMHG
AV MEAN GRADIENT: 2 MMHG
AV PEAK GRADIENT: 3 MMHG
AV VELOCITY RATIO: 0.97
BSA FOR ECHO PROCEDURE: 1.67 M2
DOP CALC AO PEAK VEL: 0.91 M/S
DOP CALC AO VTI: 19.83 CM
DOP CALC LVOT PEAK VEL VTI: 20.17 CM
DOP CALC LVOT PEAK VEL: 0.88 M/S
E WAVE DECELERATION TIME: 268 MS
E/A RATIO: 0.6
FRACTIONAL SHORTENING: 28 (ref 28–44)
INTERVENTRICULAR SEPTUM IN DIASTOLE (PARASTERNAL SHORT AXIS VIEW): 0.8 CM
INTERVENTRICULAR SEPTUM: 0.8 CM (ref 0.6–1.1)
LAAS-AP2: 13.5 CM2
LAAS-AP4: 15.3 CM2
LEFT ATRIUM SIZE: 3.2 CM
LEFT ATRIUM VOLUME (MOD BIPLANE): 38 ML
LEFT ATRIUM VOLUME INDEX (MOD BIPLANE): 22.8 ML/M2
LEFT INTERNAL DIMENSION IN SYSTOLE: 2.6 CM (ref 2.1–4)
LEFT VENTRICLE DIASTOLIC VOLUME (MOD BIPLANE): 31 ML
LEFT VENTRICLE DIASTOLIC VOLUME INDEX (MOD BIPLANE): 18.6 ML/M2
LEFT VENTRICLE SYSTOLIC VOLUME (MOD BIPLANE): 15 ML
LEFT VENTRICLE SYSTOLIC VOLUME INDEX (MOD BIPLANE): 9 ML/M2
LEFT VENTRICULAR INTERNAL DIMENSION IN DIASTOLE: 3.6 CM (ref 3.5–6)
LEFT VENTRICULAR POSTERIOR WALL IN END DIASTOLE: 0.8 CM
LEFT VENTRICULAR STROKE VOLUME: 31 ML
LV EF: 51 %
LVSV (TEICH): 31 ML
MV E'TISSUE VEL-SEP: 6 CM/S
MV PEAK A VEL: 0.72 M/S
MV PEAK E VEL: 43 CM/S
MV STENOSIS PRESSURE HALF TIME: 78 MS
MV VALVE AREA P 1/2 METHOD: 2.82
RA PRESSURE ESTIMATED: 3 MMHG
RIGHT ATRIUM AREA SYSTOLE A4C: 9.9 CM2
SL CV LEFT ATRIUM LENGTH A2C: 4.2 CM
SL CV LV EF: 60
SL CV PED ECHO LEFT VENTRICLE DIASTOLIC VOLUME (MOD BIPLANE) 2D: 56 ML
SL CV PED ECHO LEFT VENTRICLE SYSTOLIC VOLUME (MOD BIPLANE) 2D: 26 ML
TRICUSPID ANNULAR PLANE SYSTOLIC EXCURSION: 2.8 CM
TRICUSPID VALVE PEAK REGURGITATION VELOCITY: 2.2 M/S

## 2024-05-22 PROCEDURE — 93306 TTE W/DOPPLER COMPLETE: CPT

## 2024-05-22 PROCEDURE — 99214 OFFICE O/P EST MOD 30 MIN: CPT

## 2024-05-22 PROCEDURE — 71250 CT THORAX DX C-: CPT

## 2024-05-22 PROCEDURE — 93306 TTE W/DOPPLER COMPLETE: CPT | Performed by: INTERNAL MEDICINE

## 2024-05-22 PROCEDURE — G2211 COMPLEX E/M VISIT ADD ON: HCPCS

## 2024-05-22 RX ADMIN — LIDOCAINE HYDROCHLORIDE AND EPINEPHRINE BITARTRATE 1 ML: 20; .01 INJECTION, SOLUTION SUBCUTANEOUS at 15:00

## 2024-05-22 NOTE — PROGRESS NOTES
Ambulatory Visit  Name: Richie Flowers      : 1945      MRN: [id number]  Encounter Provider: Erik Easton MD  Encounter Date: 2024   Encounter department: [facility]    Assessment & Plan   Patient is a 78-year-old male with PMH of severe COPD, Smith's esophagus, history of tobacco abuse, history of prostate cancer presenting to the office for concerns of head lesion of unknown duration.  Physical exam notable for 1.5 cm X 1.5 cm brown, pigmented, raised lesion on the R parietal surface of head without evidence of vascularity with rough texture and no evidence of bleeding or tenderness.  Etiology most likely seborrheic keratosis and less likely basal cell carcinoma.  S/p office skin lesion removal and plan to submit tissue for pathology.    1. Pigmented skin lesion of uncertain behavior of head  Most likely seborrheic keratosis although, given patient's past medical history of prostate cancer and occupation involving outdoor activities, plan to submit skin lesion tissue for pathology.  Patient instructed to keep the area clean dry and refrain from showering in the next 24 hours.  Will let the patient know once the results are back and make referrals as necessary.  2. Pulmonary emphysema, unspecified emphysema type (HCC)  Reporting worsening shortness of breath due to severe COPD, managed by outpatient pulmonology with future plans for lung tissue reduction on the basis of CT scan and echocardiogram that was done today           Lesion Destruction     Date/Time  2024 3:00 PM     Performed by  Indu Shepard MD   Authorized by  Indu Shepard MD     [place] Protocol   Procedure performed by:  Consent: Verbal consent obtained.  Risks and benefits: risks, benefits and alternatives were discussed  Consent given by: patient  Patient understanding: patient states understanding of the procedure being performed  Patient consent: the patient's understanding of the  procedure matches consent given  Patient identity confirmed: verbally with patient      Local anesthesia used: yes     Anesthesia   Local anesthesia used: yes             History of Present Illness     Patient is a 78-year-old male with PMH of severe COPD, Smith's esophagus/GERD, history of tobacco abuse, history of prostate cancer presenting to the office for concerns for head lesion/brown spot of unknown duration.  Patient states that he has had a full skin exam last year from a dermatologist.  At that time, the exam was unremarkable.  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  He previously worked in steel manufacturing.  Patient states that he has had low appetite for the past 3 years with associated weight loss because he has not been working as much lately.  Patient endorses worsening shortness of breath with severe COPD with future plans with pulmonologist for a lung reduction procedure.  For that, patient has had a CT scan and echocardiogram today.  Patient has no other questions or concerns.  No history of skin cancer including melanoma, squamous cell carcinoma, basal cell carcinoma.    Unsure when the prostate cancer. Quit tobacco 1/2023.     Review of Systems   Constitutional:  Positive for activity change, appetite change and fatigue. Negative for chills, fever and unexpected weight change.   HENT:  Positive for postnasal drip and rhinorrhea. Negative for sore throat and trouble swallowing.    Respiratory:  Positive for cough and shortness of breath.    Cardiovascular:  Negative for chest pain, palpitations and leg swelling.   Gastrointestinal:  Negative for abdominal pain, constipation, diarrhea, nausea and vomiting.   Endocrine: Positive for cold intolerance.   Genitourinary:  Positive for dysuria (chronic) and enuresis.   Skin:  Positive for rash.   Neurological:  Negative for weakness.     Past Medical History:  "  Diagnosis Date    Arthritis     Smith's esophagus     Chronic cough     per pt since quit smoking less coughing-slightly yellow    Chronic pain disorder     hip    Colon polyp     COPD (chronic obstructive pulmonary disease) (HCC)     Dental bridge present     Esophageal reflux     GERD (gastroesophageal reflux disease)     History of kidney stones     History of radiation therapy     Inguinal hernia     \"had surgery\"    Moderate exercise     has landscape business and enjoys remodeling properties    Rectal adenocarcinoma (HCC)     per pt not sure of this    Right hip pain     Shortness of breath     \"with activity not if sitting or sleeping\"    Shoulder pain     left--\"feels like cracking\"    Spermatocele     \"had surgery\"     Past Surgical History:   Procedure Laterality Date    CERVICAL FUSION      COLONOSCOPY      CT NEEDLE BIOPSY LUNG  2019    DENTAL IMPLANT      EGD      FL INJECTION RIGHT HIP (NON ARTHROGRAM)  2022    HERNIA REPAIR      INSERTION PROSTATE RADIATION SEED      IN ARTHRP ACETBLR/PROX FEM PROSTC AGRFT/ALGRFT Right 2023    Procedure: ARTHROPLASTY HIP TOTAL ANTERIOR,NAVIGATED;  Surgeon: Akbar Fenton DO;  Location:  MAIN OR;  Service: Orthopedics    IN INSJ MULTI-COMPONENT INFLATABLE PENILE PROSTH N/A 2016    Procedure: INSERTION OF THREE PIECE PENILE PROSTHESIS;  Surgeon: Davi Reddy MD;  Location:  MAIN OR;  Service: Urology    PROSTATE BIOPSY  2014    ROTATOR CUFF REPAIR Bilateral      Family History   Problem Relation Age of Onset    No Known Problems Mother     No Known Problems Father      Social History     Tobacco Use    Smoking status: Former     Current packs/day: 0.00     Average packs/day: 0.5 packs/day for 68.0 years (34.0 ttl pk-yrs)     Types: Cigarettes     Start date:      Quit date: 2023     Years since quittin.3    Smokeless tobacco: Never   Vaping Use    Vaping status: Never Used   Substance and Sexual Activity    " Alcohol use: Yes     Alcohol/week: 14.0 standard drinks of alcohol     Types: 14 Standard drinks or equivalent per week     Comment: 2 michelle meyers and parveen per a day    Drug use: No    Sexual activity: Not on file     Comment: defer     Current Outpatient Medications on File Prior to Visit   Medication Sig    fluticasone-umeclidinium-vilanterol (Trelegy Ellipta) 100-62.5-25 mcg/actuation inhaler Inhale 1 puff daily Rinse mouth after use.    acetaminophen (TYLENOL) 500 mg tablet Take 2 tablets (1,000 mg total) by mouth every 8 (eight) hours (Patient not taking: Reported on 4/24/2024)    albuterol (PROVENTIL HFA,VENTOLIN HFA) 90 mcg/act inhaler Inhale 2 puffs every 4 (four) hours as needed (Patient not taking: Reported on 4/24/2024)    fluticasone-umeclidinium-vilanterol (Trelegy Ellipta) 100-62.5-25 mcg/actuation inhaler Inhale 1 puff daily Rinse mouth after use.    psyllium (METAMUCIL) 0.52 g capsule Take 1 capsule (0.52 g total) by mouth daily (Patient not taking: Reported on 5/22/2024)     Allergies   Allergen Reactions    Mobic [Meloxicam] Rash    Penicillins Hives and Other (See Comments)     Other reaction(s): facial swelling  Reaction Unknown     Immunization History   Administered Date(s) Administered    COVID-19 MODERNA VACC 0.5 ML IM 10/27/2021    INFLUENZA 11/15/2022    Influenza, high dose seasonal 0.7 mL 11/15/2022, 01/23/2024    Influenza, seasonal, injectable 10/10/2011    Pneumococcal Conjugate 13-Valent 05/30/2017    Pneumococcal Polysaccharide PPV23 10/10/2011    Td (adult), adsorbed 04/01/2008     Objective     /74 (BP Location: Left arm, Patient Position: Sitting, Cuff Size: Standard)   Pulse 67   Temp 98.7 °F (37.1 °C) (Temporal)   Wt 56.1 kg (123 lb 9.6 oz)   SpO2 92% Comment: room air  BMI 19.36 kg/m²     Physical Exam  Constitutional:       General: He is not in acute distress.     Appearance: He is normal weight. He is not ill-appearing or toxic-appearing.   HENT:       Mouth/Throat:      Mouth: Mucous membranes are moist.      Pharynx: No oropharyngeal exudate or posterior oropharyngeal erythema.   Eyes:      Pupils: Pupils are equal, round, and reactive to light.   Cardiovascular:      Rate and Rhythm: Normal rate and regular rhythm.      Pulses: Normal pulses.      Heart sounds: Normal heart sounds.   Pulmonary:      Effort: Pulmonary effort is normal.      Breath sounds: Normal breath sounds.      Comments: Restricted air movement  Abdominal:      General: Abdomen is flat.      Tenderness: There is no abdominal tenderness.   Musculoskeletal:         General: No swelling. Normal range of motion.   Lymphadenopathy:      Cervical: No cervical adenopathy.   Skin:     General: Skin is warm.      Capillary Refill: Capillary refill takes 2 to 3 seconds.      Findings: Bruising and lesion present.      Comments: Small 1.5 cm x 1.5 cm skin lesion on the right parietal region of the head, slightly raised, rough texture, hyperpigmented, no presence of vascularity, nontender to the touch, not bleeding although patient reports irritating and scratching    Neurological:      General: No focal deficit present.      Mental Status: He is alert and oriented to person, place, and time.     Administrative Statements   I have spent a total time of 30 minutes on 05/22/24 In caring for this patient including Obtaining or reviewing history  .

## 2024-05-23 ENCOUNTER — CLINICAL SUPPORT (OUTPATIENT)
Dept: PULMONOLOGY | Age: 79
End: 2024-05-23
Payer: COMMERCIAL

## 2024-05-23 DIAGNOSIS — J44.9 CHRONIC OBSTRUCTIVE PULMONARY DISEASE, UNSPECIFIED COPD TYPE (HCC): Primary | ICD-10-CM

## 2024-05-23 PROCEDURE — 94625 PHY/QHP OP PULM RHB W/O MNTR: CPT

## 2024-05-23 RX ORDER — LIDOCAINE HYDROCHLORIDE AND EPINEPHRINE BITARTRATE 20; .01 MG/ML; MG/ML
1 INJECTION, SOLUTION SUBCUTANEOUS ONCE
Status: COMPLETED | OUTPATIENT
Start: 2024-05-22 | End: 2024-05-22

## 2024-05-23 NOTE — PROGRESS NOTES
Lesion Destruction    Date/Time: 5/22/2024 3:00 PM    Performed by: Indu Shepard MD  Authorized by: Indu Shepard MD  Universal Protocol:  Procedure performed by:  Consent: Verbal consent obtained.  Risks and benefits: risks, benefits and alternatives were discussed  Consent given by: patient  Patient understanding: patient states understanding of the procedure being performed  Patient consent: the patient's understanding of the procedure matches consent given  Procedure consent: procedure consent matches procedure scheduled  Patient identity confirmed: verbally with patient    Procedure Details - Lesion Destruction:     Number of Lesions:  1  Lesion 1:     Body area:  Head/neck    Head/neck location:  Scalp    Initial size (mm):  15    Final defect size (mm):  17    Destruction method: scissors used for extraction       Lesion on patient's scalp was cleaned with alcohol swab and then with Betadine.  Allowed the area to air dry.  Afterwards, lidocaine with epi was used for local anesthesia and a wheel pattern.  After identifying that the area was numb, a small scalpel was used to extract the lesion providing good clean margins.  Minimal amount of blood noticed underneath.  This area was cauterized until good control of bleeding appreciated.  Patient tolerated the procedure without significant pain.  Patient instructed to keep the area clean and dry for the next 24 hours.

## 2024-05-28 ENCOUNTER — CLINICAL SUPPORT (OUTPATIENT)
Dept: PULMONOLOGY | Age: 79
End: 2024-05-28
Payer: COMMERCIAL

## 2024-05-28 DIAGNOSIS — J44.9 CHRONIC OBSTRUCTIVE PULMONARY DISEASE, UNSPECIFIED COPD TYPE (HCC): Primary | ICD-10-CM

## 2024-05-28 PROCEDURE — 94625 PHY/QHP OP PULM RHB W/O MNTR: CPT

## 2024-05-28 NOTE — PROGRESS NOTES
"Pulmonary Rehabilitation Assessment and Individualized Treatment Plan  90 DAY    Today's date: May 28, 2024  Patient name: Richie Flowers   # of exercise sessions: 22    : 1945       MRN: [id number]  PCP: Indu Shepard MD  Referring Physician: Genia Dorado DO  Pulmonologist: DO Stephanie Garcia MD - [facility]   Patient had a recent visit with Dr. Townsend about valve placements     Provider: Cristóbal  Clinician: Jennifer Correia MS, EP    Dx:  COPD  Date of onset: patient stated he has been dealing with this for 5 years, but is getting worse every month          ASSESSMENT      Weight:    Wt Readings from Last 1 Encounters:   24 56.1 kg (123 lb 9.6 oz)      Height:   Ht Readings from Last 1 Encounters:   24 5' 7\" (1.702 m)       Medical History:   Past Medical History:   Diagnosis Date    Arthritis     Smith's esophagus     Chronic cough     per pt since quit smoking less coughing-slightly yellow    Chronic pain disorder     hip    Colon polyp     COPD (chronic obstructive pulmonary disease) (HCC)     Dental bridge present     Esophageal reflux     GERD (gastroesophageal reflux disease)     History of kidney stones     History of radiation therapy     Inguinal hernia     \"had surgery\"    Moderate exercise     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    Rectal adenocarcinoma (HCC)     per pt not sure of this    Right hip pain     Shortness of breath     \"with activity not if sitting or sleeping\"    Shoulder pain     left--\"feels like cracking\"    Spermatocele     \"had surgery\"       Family History:  Family History   Problem Relation Age of Onset    No Known Problems Mother     No Known Problems Father        Allergies:   Mobic [meloxicam] and Penicillins    ETOH:   Social History     Substance and Sexual Activity   Alcohol Use Yes    Alcohol/week: 14.0 standard drinks of alcohol    Types: 14 Standard drinks or equivalent per week    Comment: 2 michelle " mira and parveen per a day       Current Medications:   Current Outpatient Medications   Medication Sig Dispense Refill    acetaminophen (TYLENOL) 500 mg tablet Take 2 tablets (1,000 mg total) by mouth every 8 (eight) hours (Patient not taking: Reported on 4/24/2024) 60 tablet 0    albuterol (PROVENTIL HFA,VENTOLIN HFA) 90 mcg/act inhaler Inhale 2 puffs every 4 (four) hours as needed (Patient not taking: Reported on 4/24/2024)      fluticasone-umeclidinium-vilanterol (Trelegy Ellipta) 100-62.5-25 mcg/actuation inhaler Inhale 1 puff daily Rinse mouth after use. 180 blister 1    fluticasone-umeclidinium-vilanterol (Trelegy Ellipta) 100-62.5-25 mcg/actuation inhaler Inhale 1 puff daily Rinse mouth after use. 180 blister 1    psyllium (METAMUCIL) 0.52 g capsule Take 1 capsule (0.52 g total) by mouth daily (Patient not taking: Reported on 5/22/2024) 30 capsule 3     No current facility-administered medications for this visit.       Physical Limitations: N/A, no physical limitations     Fall Risk: Low   Comments: Ambulates with a steady gait with no assist device and Denies a fall in the past 6 months    Oxygen needs:   Rest:  room air  Exercise/physical activity:  room air  Sleep:   room air  Does not want oxygen    Does Pt monitor home SpO2? no   Average SpO2 at rest:  %   Average SpO2 with ADLs/physical activity:  %      Use of Rescue Inhaler:  yes, once a day    Use of Maintenance Inhaler: No    Use of Nebulizer Treatments:  No    Patient practices breathing techniques at home:  No  Does have spirometer     Pulmonary Disease Risk Factors:  smoking  Quit smoking a year ago    CAD Risk Factors   Cholesterol: No  Smoking: Former user  HTN: Yes  DM: No  Obesity: No   Inactivity: Yes  Stress:  perceived  stress: 2/10   Stressors:current health/breathing situation    Goals for Stress Management: Practice Relaxation Techniques, Spend time outside, and Enjoy a hobby      Current Functional Status  Occupation: retired  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, flips, and sells  Also drives truck for [employer]   Recreation: see above  ADL’s:Capable of performing light to moderate ADLs able to perform self-care resumed driving lives alone  [place]: Capable of performing light to moderate ADLs able to perform self-care resumed driving lives alone  Exercise: N/A  Home exercise equipment: No   Other: worked at [employer] for 30 years     Patient Specific Goals:     EXERCISE GOALS (home exercise, ADLs):   Decrease SOB, increase strength    NUTRITION GOALS (wt management, diabetes management, dietary modifications):   Gain healthy weight    PSYCHOCOSOCIAL GOALS (stress, emotional well being, social support):    Worry less with jobs   CORE COMPONENT GOALS (smoking, BP control, medication):   Take medication as needed, pt does not like to take any  medication that is prescibed      Ability to reach goals/rehabilitation potential:  Very Good     Projected return to function: 8-12 weeks  Objective tests: 6 MWT    Cultural needs: N/A    Comments:           INDIVIDUALIZED TREATMENT PLAN        SUMMARY OF PROGRESS:    5/28: Richie Flowers has attended 22 exercise sessions in the past 30 days.   He tolerates 40 mins at 2.7 - 4.0 METs.  A light strength training component has been added to their exercise program. and patient has not done weight training since staff taught him, pt does not care for weight training .   He is tolerating progression of intensity levels to maintain RPE 4-6.   Resting BP  126/78 - 140/70 with Normal response to exercise reaching 120/62- 130/78. Patient is exercising on room air, does not use oxygen at home very often. Has portable oxygen in car if needs, but does not use. RHR 62 - 65  with Normal response to exercise reaching 76 - 81.  Patient has slight to moderate SOB with exertion. Patient attends group educational classes on risk factor modification.   His exercise program will be progressed as tolerated to  maintain RPE 4-6.  They will continue to be educated on lifestyle modification and encouraged to supplement with a home exercise program as tolerated.    4/30: Richie Flowers has attended 16 exercise sessions in the past 30 days.   He tolerates 40 mins at 3.9 - 4.0 METs.  A light strength training component has been added to their exercise program..   He is tolerating progression of intensity levels to maintain RPE 4-6.   Resting BP  120/64 - 132/76 with Normal response to exercise reaching 136/68- 140/80. Patient is exercising on room air and does not use oxygen at home. However, patient recently had visit with Dr. Townsend for a consultation about getting valve placements and she order him home oxygen to be used when needed. Patient has not been using it at home, feels he does not need it. He does not exert himself to need it while at home. 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 RHR 72 - 90  with Normal response to exercise reaching 90 - 98. Experiences moderate SOB during exercise.  Patient attends group educational classes on pulmonary disease.   His exercise program will be progressed as tolerated to maintain RPE 4-6.  They will continue to be educated on lifestyle modification and encouraged to supplement with a home exercise program as tolerated.    4/2: Richie has attended 7 exercise sessions in the past 30 days.   He tolerates 30-37 mins at 2.6 - 4.0 METs.  A light strength training component will be added in a future exercise session..   He is tolerating progression of intensity levels to maintain RPE 4-6.   Resting BP  102/62 - 130/62 with Normal response to exercise reaching 132/80- 148/70. Resting SpO2 94-90%.  RHR 60 - 72  with Normal response to exercise reaching 72 - 84.  EX [id number]%. He remains on room air. GUSTAFSON rating 4/10. Patient attends group educational classes on pulmonary risk factor modification.   His exercise program will be progressed  as tolerated to maintain RPE 4-6.  They will continue to be educated on lifestyle modification and encouraged to supplement with a home exercise program as tolerated.      Medication compliance: Yes   Comments: Pt reports to be compliant with medications      Assessment of progression of lung disease and functional status:  CAT: 16/40  Shortness of breath questionnaire: 86/120      EXERCISE ASSESSMENT and PLAN    Current Exercise Program in Rehab:       Frequency: 2 days/week   Supplement with home exercise 2+ days/wk as tolerated        Minutes: 35-40         METS: 3.9-4.0              SpO2: >88%              RPD: 4-6                      HR: 50-85% HRR or RHR +30-40bpm:    RPE: 4-5         Modalities: Treadmill, UBE, NuStep, and Recumbent bike      Exercise Progression 30 Day Goals :    Frequency: 2 days/week    Supplement with home exercise 2+ days/wk as tolerated       Minutes: 35-38        METS: 3.0-4.5              SpO2: >88%              RPD: 4-6                      HR: 20-30 beats above RHR    RPE: 4-5        Modalities: Treadmill, UBE, Lifecycle, NuStep, and Recumbent bike     Strength trainin-3 days / week  12-15 repetitions   Modalities: Leg Press, Chest Press, Pull Downs, Lateral Raise, and Arm Extension    Home Exercise:  no structured home exercise but 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 .  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, stone, etc, doing jobs at his [employer]       Education: pursed lipped breathing, diaphragmatic breathing, relaxation breathing, home exercise guidelines, benefits of exercise for pulmonary disease, RPE scale, RPD scale, and education class: Exercise For The Pulmonary Patient    SMART Goals:   reduced score on CAT, improved 6MWT distance, reduced dyspnea during exercise, and SpO2 >88% during exercise    Patient Specific EXERCISE GOALS:   (home exercise, ADLs): attend pulmonary rehab regularly, start a  "walking program, begin a consistent exercise regimen , increased muscular strength, increased energy, and increased stamina with ADLs    Patient's progress toward SMART and personal goals: pt attends [facility] regularly, slight improvement of energy/strength/stamina  Continues to complete project around the house, works in his shop, cutting grass, rental properties, no structured exercise    Patient specific plan for next 30 days: encouraged starting a home walking routine, use oxygen when needed     Plan: Titrate supplemental oxygen as needed to maintain SpO2>88% with exercise, learn to conserve energy with ADLs , practice diaphragmatic breathing, increased strength of respiratory muscles, and utilize PLB with physical activity    Readiness to change: Action:  (Changing behavior)      NUTRITION ASSESSMENT AND PLAN    Weight control:    Starting weight: 124   Current weight:   121    Diabetes: N/A  A1c:     last measured:     SMART Goals:   eat whole grain breads, brown rice and whole grain cereals, eat 5 or more servings of fruits and vegetables a day, eat red meat once a week or less, choose lean beef or rarely eat beef, eat poultry without the skin, eat chicken and fish that is not fried, eat meatless meals twice a week or more, rarely eat frozen desserts or choose fruit or fat-free sweets, Do not cook with oil, butter or margarine, Do not eat fried foods, use \"light\" tub margarine on bread, potatoes and vegetables, choose healthy snacks such as fruit, pretzels, and low fat crackers, choose healthy desserts and sweets such as carole food cake or  fruit, choose low sodium canned, frozen/packaged foods or rarely/never eat, and rarely/never eat salty snacks      Patient Specific NUTRITION GOALS:  (wt control, diabetes management, dietary modifications): increase water intake to reduce/thin mucus production reduced SOB while eating improve hydration weight gain increased muscle mass    Patient's progress toward SMART and " "personal goals: patient has been losing weekly    Patient specific plan for next 30 days: encouraged increasing hydration, provided handout on underweight diet/healthy weight gain, encouraged adding a daily smoothie  Patient stated he does not have an appetite, eating maybe 2 meals per day, encouraged to eat high protein meals to help gain weight   Eats out often at restaurants, encouraged to choose healthier foods, higher protein, encouraged more hydration, does not like water     Plan: group class: Reading Food Labels, replace refined flours with whole grains, increase daily intake of fruits and vegetables, limit meat intake to less than 6oz per day, choose healthy meals while dining out, reduce intake and /or  rarely eat processed meats , never/rarely eat fried foods, use \"light\" tub margarine, eat healthy snacks like fruit, pretzels, and low fat crackers, never/rarely add salt to food when cooking or at the table, choose low sugar desserts and sweets, drink less than 8oz of non-diet soda, punch etc. per day, and drink no more than 1-2 alcoholic drinks in a day    Measurable goals were based Rate Your Plate Dietary Self-Assessment. These are the areas in which the patient could score higher on the assessment.  Goals include recommendations for a heart healthy diet based on American Heart Association.    Education: low sodium diet  maintaining hydration  healthy choices while dining out  portion control  group class:  Label Reading  group class: healthy eating for managing pulmonary illness  EDU: Underweight Diet    Readiness to change: Preparation:  (Getting ready to change)  and Action:  (Changing behavior)      PSYCHOSOCIAL ASSESSMENT AND PLAN    Emotional:  Depression assessment:  PHQ-9 = 13  10-14 = Moderate Depression            Anxiety measure:  CELSO-7 = 0  0-4  = Not anxious    Assessment of depression and anxiety    Patient reports they are coping well with good social support and denies depression or " anxiety    Self-reported stress level:  2  Stress Management: Practice Relaxation Techniques, Exercise, and Enjoy a hobby    Patient's rating of Social support: Very Good   Social Support Network: friends and patient does not have close relationships for emotional/social support    Psychosocial Assessment as it relates to rehabilitation: Patient denies issues with his/her family or home life that may affect their rehabilitation efforts.       SMART Goals:    Physical Fitness in [place] Score < 3, Social Support in [place] Score < 3, Daily Activity in [place] Score < 3, Social Activities in [place] Score < 3, Overall Health in [place] Score < 3, Quality of Life in [place] Score < 3 , Change in Health in [place] Score < 3 , feel less tired with more energy, Improved appetite control, take time to relax, and reduced irritability    Patient Specific PSYCHOCOSOCIAL GOALS:  (stress, emotional well being, social support):  improve relationship with family, not to stress out as much at work getting jobs done on time    Patient's progress toward SMART and personal goals: encouraged to take time to oneself   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     Patient specific plan for next 30 days: ask for more social support from family     Plan: Class: Stress and Your Health, Class: Relaxation, Exercise, Spend time outdoors, and Keep a positive mindset    Education: benefits of a positive support system, stress management techniques, class:  Stress and Your Health , and class:  Stress and Pulmonary Disease    Readiness to change: Action:  (Changing behavior)      OTHER CORE COMPONENTS     Tobacco:   Social History     Tobacco Use   Smoking Status Former    Current packs/day: 0.00    Average packs/day: 0.5 packs/day for 68.0 years (34.0 ttl pk-yrs)    Types: Cigarettes    Start date:     Quit date: 2023    Years since quittin.3   Smokeless Tobacco Never       Tobacco Use Intervention: Referral to  tobacco expert:   N/A: Pt has a remote history of smoking    Blood pressure:    Restin//70   Exercise: 120//78    Oxygen Goal: Maintain SpO2>88% during exercise or as advised by pulmonolgist    SMART Goals: reduced dietary sodium <2000mg, medication compliance, Abstain from smoking, and monitor home pulse oximetry    Patient Specific CORE COMPONENT GOALS (smoking, BP control, angina control, medication compliance): reduced dietary sodium <2000mg and medication compliance    Patient's progress toward SMART and personal goals: monitor pulse ox at home, medication compliance      Patient specific plan for next 30 days: take medication as doctor prescribed, continue to monitor pulse ox at home, practice PLB   Use oxygen when needed, 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

## 2024-05-30 ENCOUNTER — DOCUMENTATION (OUTPATIENT)
Age: 79
End: 2024-05-30

## 2024-06-03 ENCOUNTER — TELEPHONE (OUTPATIENT)
Age: 79
End: 2024-06-03

## 2024-06-07 NOTE — PROGRESS NOTES
[facility] Cardiopulmonary Rehab  [facility]    Pulmonary Rehabilitation      Dear Dr. Townsend,    Thank you for referring your patient to our Pulmonary Rehabilitation program. The patient has completed 22  visits. However, rehab is being discontinued at this time due to:    Patient had 2 more sessions to complete until he was discharged from the program and stated he was too busy and did not want to finish the program.      Please contact us at [phone] if you have any questions about this patent’s case.  Thank you for your continued support of cardiac rehabilitation.       Sincerely,    Jennifer Correia, MS, EP

## 2024-06-13 ENCOUNTER — OFFICE VISIT (OUTPATIENT)
Dept: PULMONOLOGY | Facility: CLINIC | Age: 79
End: 2024-06-13
Payer: COMMERCIAL

## 2024-06-13 VITALS
DIASTOLIC BLOOD PRESSURE: 60 MMHG | SYSTOLIC BLOOD PRESSURE: 100 MMHG | HEART RATE: 65 BPM | TEMPERATURE: 96.3 F | RESPIRATION RATE: 18 BRPM | WEIGHT: 125 LBS | BODY MASS INDEX: 19.62 KG/M2 | HEIGHT: 67 IN | OXYGEN SATURATION: 93 %

## 2024-06-13 DIAGNOSIS — J44.9 COPD, SEVERE (HCC): Primary | ICD-10-CM

## 2024-06-13 DIAGNOSIS — R91.8 LUNG NODULES: ICD-10-CM

## 2024-06-13 DIAGNOSIS — J96.11 CHRONIC RESPIRATORY FAILURE WITH HYPOXIA (HCC): ICD-10-CM

## 2024-06-13 PROCEDURE — 99215 OFFICE O/P EST HI 40 MIN: CPT | Performed by: INTERNAL MEDICINE

## 2024-06-13 NOTE — ASSESSMENT & PLAN NOTE
Patient continues to struggle with shortness of breath on even minimal exertion.  He is interested in pursuing bronchoscopic lung volume reduction and has completed much of the testing.  Based on recent CT analysis, it appears that his left lower lobe would likely be the preferred target with right lower lobe being a secondary option.  He will need quantitative SPECT-CT to evaluate perfusion pattern to help definitively identify preferred target.  Given his subjective report that he feels his pulmonary status has declined, I would like to update his PFTs and 6-minute walk test prior to moving forward, since his most recent study is nearly 9 months old.

## 2024-06-13 NOTE — ASSESSMENT & PLAN NOTE
Before moving forward with valves, we must ensure that the scattered nodular opacities have resolved.  I suspect they are inflammatory in nature, however we must ensure resolution before treating a lobe that would have nodules within it.  We would be unable to follow with surveillance imaging once lobar atelectasis is achieved.  Plan for repeat CT in August.  Assuming nodules have resolved, we will move forward with scheduling him for BLVR.

## 2024-06-13 NOTE — PROGRESS NOTES
Pulmonary Follow Up Note   Richie Flowers 78 y.o. male MRN: [id number]  6/13/2024      Assessment/Plan:     COPD, severe (HCC)  Patient continues to struggle with shortness of breath on even minimal exertion.  He is interested in pursuing bronchoscopic lung volume reduction and has completed much of the testing.  Based on recent CT analysis, it appears that his left lower lobe would likely be the preferred target with right lower lobe being a secondary option.  He will need quantitative SPECT-CT to evaluate perfusion pattern to help definitively identify preferred target.  Given his subjective report that he feels his pulmonary status has declined, I would like to update his PFTs and 6-minute walk test prior to moving forward, since his most recent study is nearly 9 months old.    Chronic respiratory failure with hypoxia (HCC)  Patient was prescribed supplemental oxygen and is to use it at 2 L/min with exertion.  He has used it on a few occasions, but is not using it reliably.  I suggested that he take it with him when he leaves the home and he verbalizes understanding and agrees.    Lung nodules  Before moving forward with valves, we must ensure that the scattered nodular opacities have resolved.  I suspect they are inflammatory in nature, however we must ensure resolution before treating a lobe that would have nodules within it.  We would be unable to follow with surveillance imaging once lobar atelectasis is achieved.  Plan for repeat CT in August.  Assuming nodules have resolved, we will move forward with scheduling him for BLVR.    Time spent on today's visit, 45 minutes with greater than 50%spent counseling coordinating care.  Reviewing imaging, discussing the procedure of bronchoscopic lung volume reduction and discussing stepwise approach.    Visit orders:    Diagnoses and all orders for this visit:    COPD, severe (HCC)  -     CT chest without contrast; Future  -     Complete PFT with post Bronchodilator  "and Six Minute walk; Future  -     Blood gas, arterial; Future  -     NM lung quantative perfusion w spect ct; Future    Lung nodules    Chronic respiratory failure with hypoxia (HCC)      History of Present Illness   HPI:  Richie Flowers is a 78 y.o. male who is here today to have further discussion regarding bronchoscopic lung volume reduction.  He continues to struggle with shortness of breath on minimal exertion.  Despite this, he remains active, trying to perform as much physical activity as possible.  He completed pulmonary rehabilitation but did not find it to be very beneficial.  He feels as if he is getting worse every day.  He has a chronic cough without much sputum production.  He denies significant wheezing.  He uses Trelegy Ellipta but does not feel that it has been beneficial.  He does not use albuterol rescue inhaler.    Review of Systems otherwise negative    Medical, Family and Social history reviewed and updated as appropriate    Historical Information   Past Medical History:   Diagnosis Date    Arthritis     Smith's esophagus     Chronic cough     per pt since quit smoking less coughing-slightly yellow    Chronic pain disorder     hip    Colon polyp     COPD (chronic obstructive pulmonary disease) (HCC)     Dental bridge present     Esophageal reflux     GERD (gastroesophageal reflux disease)     History of kidney stones     History of radiation therapy     Inguinal hernia     \"had surgery\"    Moderate exercise     has landscape business and enjoys remodeling properties    Rectal adenocarcinoma (HCC)     per pt not sure of this    Right hip pain     Shortness of breath     \"with activity not if sitting or sleeping\"    Shoulder pain     left--\"feels like cracking\"    Spermatocele     \"had surgery\"     Past Surgical History:   Procedure Laterality Date    CERVICAL FUSION      COLONOSCOPY      CT NEEDLE BIOPSY LUNG  07/09/2019    DENTAL IMPLANT      EGD      FL INJECTION RIGHT HIP (NON " ARTHROGRAM)  2022    HERNIA REPAIR      INSERTION PROSTATE RADIATION SEED      [place] ARTHRP ACETBLR/PROX FEM PROSTC AGRFT/ALGRFT Right 2023    Procedure: ARTHROPLASTY HIP TOTAL ANTERIOR,NAVIGATED;  Surgeon: Akbar Fenton DO;  Location:  MAIN OR;  Service: Orthopedics    TN INSJ MULTI-COMPONENT INFLATABLE PENILE PROSTH N/A 2016    Procedure: INSERTION OF THREE PIECE PENILE PROSTHESIS;  Surgeon: Davi Reddy MD;  Location: [facility];  Service: Urology    PROSTATE BIOPSY  2014    ROTATOR CUFF REPAIR Bilateral      Family History   Problem Relation Age of Onset    No Known Problems Mother     No Known Problems Father        Social History     Tobacco Use   Smoking Status Former    Current packs/day: 0.00    Average packs/day: 0.5 packs/day for 68.0 years (34.0 ttl pk-yrs)    Types: Cigarettes    Start date:     Quit date: 2023    Years since quittin.4   Smokeless Tobacco Never     Meds/Allergies     Current Outpatient Medications:     fluticasone-umeclidinium-vilanterol (Trelegy Ellipta) 100-62.5-25 mcg/actuation inhaler, Inhale 1 puff daily Rinse mouth after use., Disp: 180 blister, Rfl: 1    acetaminophen (TYLENOL) 500 mg tablet, Take 2 tablets (1,000 mg total) by mouth every 8 (eight) hours (Patient not taking: Reported on 2024), Disp: 60 tablet, Rfl: 0    albuterol (PROVENTIL HFA,VENTOLIN HFA) 90 mcg/act inhaler, Inhale 2 puffs every 4 (four) hours as needed (Patient not taking: Reported on 2024), Disp: , Rfl:     fluticasone-umeclidinium-vilanterol (Trelegy Ellipta) 100-62.5-25 mcg/actuation inhaler, Inhale 1 puff daily Rinse mouth after use., Disp: 180 blister, Rfl: 1    psyllium (METAMUCIL) 0.52 g capsule, Take 1 capsule (0.52 g total) by mouth daily (Patient not taking: Reported on 2024), Disp: 30 capsule, Rfl: 3  Allergies   Allergen Reactions    Mobic [Meloxicam] Rash    Penicillins Hives and Other (See Comments)     Other reaction(s): facial  "swelling  Reaction Unknown       Vitals: Blood pressure 100/60, pulse 65, temperature (!) 96.3 °F (35.7 °C), temperature source Tympanic, resp. rate 18, height 5' 7\" (1.702 m), weight 56.7 kg (125 lb), SpO2 93%. Body mass index is 19.58 kg/m². Oxygen Therapy  SpO2: 93 %  Oxygen Therapy: None (Room air)    Physical Exam   Physical Exam  Vitals reviewed.   Constitutional:       General: He is not in acute distress.     Appearance: He is well-developed.   Eyes:      General: No scleral icterus.  Neck:      Vascular: No JVD.   Cardiovascular:      Rate and Rhythm: Normal rate and regular rhythm.   Pulmonary:      Breath sounds: No wheezing, rhonchi or rales.   Abdominal:      Tenderness: There is no abdominal tenderness.   Skin:     General: Skin is warm and dry.   Neurological:      Mental Status: He is alert and oriented to person, place, and time.   Psychiatric:         Mood and Affect: Mood normal.         Labs: I have personally reviewed pertinent lab results.  Lab Results   Component Value Date    WBC 7.78 07/12/2023    HGB 14.6 07/12/2023    HCT 45.3 07/12/2023    MCV 98 07/12/2023     07/12/2023     Lab Results   Component Value Date    CALCIUM 9.2 07/12/2023    K 4.5 07/12/2023    CO2 27 07/12/2023     (H) 07/12/2023    BUN 16 07/12/2023    CREATININE 0.82 07/12/2023     No results found for: \"IGE\"  Lab Results   Component Value Date    ALT 30 04/09/2023    AST 64 (H) 04/09/2023    ALKPHOS 75 04/09/2023     Performed 10/23/2023  FEV1/FVC ratio 30%    FEV1 36% predicted  FVC 88% predicted  No response to bronchodilators  Post BD FEV1 33%, 0.90 L   % predicted   % predicted  DLCO corrected for hemoglobin 27 % predicted  PFTs with severe obstruction, severe air trapping and hyperinflation, severe diffusion impairment     6WMT   Date 4/24/2024  Room air saturations at rest are 96%.  Patient ambulated for 6 minutes with saturations dropping to 86% at minute 4.  He was then placed on " supplemental oxygen at 2 L/min to complete testing.  Saturations remained 94% or greater on 2 L.  Distance walked 270m     Pulmonary Rehabilitation -completed May 2024     2D echocardiogram -5/22/2024, EF 60%, grade 1 diastolic dysfunction, RVSP could not be assessed     Imaging and other studies: I have personally reviewed pertinent reports.   and I have personally reviewed pertinent films in PACS CT of the chest performed on 5/22/2024 shows moderate to severe emphysema, mucous plugging suggestive of chronic bronchiectasis/bronchiolitis and multiple new nodular opacities, likely infectious/inflammatory in nature

## 2024-06-13 NOTE — ASSESSMENT & PLAN NOTE
Patient was prescribed supplemental oxygen and is to use it at 2 L/min with exertion.  He has used it on a few occasions, but is not using it reliably.  I suggested that he take it with him when he leaves the home and he verbalizes understanding and agrees.

## 2024-06-14 ENCOUNTER — TELEPHONE (OUTPATIENT)
Age: 79
End: 2024-06-14

## 2024-06-14 NOTE — TELEPHONE ENCOUNTER
Pt calls in and states that He has scheduled his PFT and CT but he feels that his SOB is getting worse. Pt states he would like to discuss treatment and not have to wait until August which is when his CT is. He is afraid by that time provider will not have a pt to treat. PFT is inJuly. Pt asking if provider can suggest something that will relieve his SOB. Please advise

## 2024-06-18 DIAGNOSIS — J44.9 COPD, SEVERE (HCC): Primary | ICD-10-CM

## 2024-06-18 NOTE — TELEPHONE ENCOUNTER
Called and left patient a message advising his SPECT CT and  PFT have been moved up to next week. I advised pt of testing sites for both and provided him with my direct line to return my call if he has any questions.    Thank You

## 2024-06-25 ENCOUNTER — HOSPITAL ENCOUNTER (OUTPATIENT)
Dept: PULMONOLOGY | Facility: HOSPITAL | Age: 79
Discharge: HOME | End: 2024-06-25
Attending: INTERNAL MEDICINE
Payer: COMMERCIAL

## 2024-06-25 PROCEDURE — 94618 PULMONARY STRESS TESTING: CPT | Performed by: INTERNAL MEDICINE

## 2024-06-25 PROCEDURE — 94060 EVALUATION OF WHEEZING: CPT | Performed by: INTERNAL MEDICINE

## 2024-06-25 PROCEDURE — 94729 DIFFUSING CAPACITY: CPT | Performed by: INTERNAL MEDICINE

## 2024-06-25 PROCEDURE — 94726 PLETHYSMOGRAPHY LUNG VOLUMES: CPT | Performed by: INTERNAL MEDICINE

## 2024-06-28 ENCOUNTER — HOSPITAL ENCOUNTER (OUTPATIENT)
Dept: PULMONOLOGY | Facility: HOSPITAL | Age: 79
Discharge: HOME | End: 2024-06-28
Attending: INTERNAL MEDICINE

## 2024-06-28 ENCOUNTER — HOSPITAL ENCOUNTER (OUTPATIENT)
Dept: PULMONOLOGY | Facility: HOSPITAL | Age: 79
Discharge: HOME | End: 2024-06-28
Attending: INTERNAL MEDICINE
Payer: COMMERCIAL

## 2024-06-28 DIAGNOSIS — J44.9 COPD, SEVERE (HCC): ICD-10-CM

## 2024-06-28 PROCEDURE — 94618 PULMONARY STRESS TESTING: CPT

## 2024-06-28 PROCEDURE — 94729 DIFFUSING CAPACITY: CPT

## 2024-06-28 PROCEDURE — 36600 WITHDRAWAL OF ARTERIAL BLOOD: CPT

## 2024-06-28 PROCEDURE — 94726 PLETHYSMOGRAPHY LUNG VOLUMES: CPT

## 2024-06-28 PROCEDURE — 94060 EVALUATION OF WHEEZING: CPT

## 2024-06-28 RX ORDER — ALBUTEROL SULFATE 2.5 MG/3ML
2.5 SOLUTION RESPIRATORY (INHALATION) ONCE AS NEEDED
Status: DISCONTINUED | OUTPATIENT
Start: 2024-06-28 | End: 2024-07-02 | Stop reason: HOSPADM

## 2024-07-01 DIAGNOSIS — J43.1 PANLOBULAR EMPHYSEMA (HCC): ICD-10-CM

## 2024-07-01 DIAGNOSIS — J44.9 COPD, SEVERE (HCC): Primary | ICD-10-CM

## 2024-07-01 RX ORDER — FLUTICASONE FUROATE, UMECLIDINIUM BROMIDE AND VILANTEROL TRIFENATATE 100; 62.5; 25 UG/1; UG/1; UG/1
1 POWDER RESPIRATORY (INHALATION) DAILY
Qty: 180 BLISTER | Refills: 1 | Status: CANCELLED | OUTPATIENT
Start: 2024-07-01

## 2024-07-01 RX ORDER — FLUTICASONE FUROATE, UMECLIDINIUM BROMIDE AND VILANTEROL TRIFENATATE 200; 62.5; 25 UG/1; UG/1; UG/1
1 POWDER RESPIRATORY (INHALATION) DAILY
Qty: 180 BLISTER | Refills: 1 | Status: SHIPPED | OUTPATIENT
Start: 2024-07-01 | End: 2024-12-28

## 2024-07-01 NOTE — TELEPHONE ENCOUNTER
Patient needs refill on the following medication, which was increased at his last visit:    David Keenan  200-62.5-25 mcg inhaler    He would like it sent to [facility] in [place] for a 3 month supply.

## 2024-07-01 NOTE — TELEPHONE ENCOUNTER
Spoke to patient and he stated that the Pulmonologist from [facility] was the provider to increase the dose.     Patient state he sees Dr. Townsend at  pulmonology. They will manage & refill this medication.

## 2024-07-02 ENCOUNTER — HOSPITAL ENCOUNTER (OUTPATIENT)
Dept: RADIOLOGY | Facility: HOSPITAL | Age: 79
Discharge: HOME | End: 2024-07-02
Attending: INTERNAL MEDICINE
Payer: COMMERCIAL

## 2024-07-02 DIAGNOSIS — J44.9 COPD, SEVERE (HCC): ICD-10-CM

## 2024-07-02 PROCEDURE — A9540 TC99M MAA: HCPCS

## 2024-07-02 PROCEDURE — 78830 RP LOCLZJ TUM SPECT W/CT 1: CPT

## 2024-07-02 PROCEDURE — 78597 LUNG PERFUSION DIFFERENTIAL: CPT

## 2024-07-08 ENCOUNTER — HOSPITAL ENCOUNTER (OUTPATIENT)
Dept: RADIOLOGY | Facility: HOSPITAL | Age: 79
Discharge: HOME | End: 2024-07-08
Attending: INTERNAL MEDICINE
Payer: COMMERCIAL

## 2024-07-08 DIAGNOSIS — J44.9 COPD, SEVERE (HCC): ICD-10-CM

## 2024-07-08 PROCEDURE — 71250 CT THORAX DX C-: CPT

## 2024-07-22 ENCOUNTER — TELEPHONE (OUTPATIENT)
Age: 79
End: 2024-07-22

## 2024-07-22 DIAGNOSIS — J42 CHRONIC BRONCHITIS, UNSPECIFIED CHRONIC BRONCHITIS TYPE (HCC): Primary | ICD-10-CM

## 2024-07-22 RX ORDER — AZITHROMYCIN 250 MG/1
TABLET, FILM COATED ORAL
Qty: 6 TABLET | Refills: 0 | Status: SHIPPED | OUTPATIENT
Start: 2024-07-22 | End: 2024-07-26

## 2024-07-22 RX ORDER — PREDNISONE 20 MG/1
40 TABLET ORAL DAILY
Qty: 10 TABLET | Refills: 0 | Status: SHIPPED | OUTPATIENT
Start: 2024-07-22

## 2024-07-22 NOTE — TELEPHONE ENCOUNTER
Spoke with patient.  We reviewed chest CT.  He has patchy areas of inflammation, suspect inflammatory/infectious.  He has his stable chronic cough.  I will prescribe a day course of prednisone as well as a Z-Angel in anticipation of having the valves and minimizing infectious issues post procedure.  We can proceed with scheduling next available.

## 2024-07-29 ENCOUNTER — TELEPHONE (OUTPATIENT)
Age: 79
End: 2024-07-29

## 2024-07-29 DIAGNOSIS — J44.9 COPD, SEVERE (HCC): Primary | ICD-10-CM

## 2024-07-29 NOTE — TELEPHONE ENCOUNTER
Yun KNIGHT from [employer] calling to let Dr. Townsend know the 3 day in person stay for pt's zephyrs procedure has been overturned and approved.    Auth number [id number]  Auth dates 7/22/2024-10/27/2024

## 2024-08-09 ENCOUNTER — APPOINTMENT (OUTPATIENT)
Dept: LAB | Facility: IMAGING CENTER | Age: 79
End: 2024-08-09
Payer: COMMERCIAL

## 2024-08-09 DIAGNOSIS — C61 MALIGNANT NEOPLASM OF PROSTATE (HCC): ICD-10-CM

## 2024-08-09 DIAGNOSIS — J43.1 PANACINAR EMPHYSEMA (HCC): ICD-10-CM

## 2024-08-09 DIAGNOSIS — E55.9 VITAMIN D DEFICIENCY: ICD-10-CM

## 2024-08-09 DIAGNOSIS — E44.0 MALNUTRITION OF MODERATE DEGREE (HCC): ICD-10-CM

## 2024-08-09 DIAGNOSIS — K59.04 CHRONIC IDIOPATHIC CONSTIPATION: ICD-10-CM

## 2024-08-09 LAB
25(OH)D3 SERPL-MCNC: 26.8 NG/ML (ref 30–100)
ALBUMIN SERPL BCG-MCNC: 3.8 G/DL (ref 3.5–5)
ALP SERPL-CCNC: 69 U/L (ref 34–104)
ALT SERPL W P-5'-P-CCNC: 11 U/L (ref 7–52)
ANION GAP SERPL CALCULATED.3IONS-SCNC: 10 MMOL/L (ref 4–13)
AST SERPL W P-5'-P-CCNC: 24 U/L (ref 13–39)
BASOPHILS # BLD AUTO: 0.04 THOUSANDS/ÂΜL (ref 0–0.1)
BASOPHILS NFR BLD AUTO: 1 % (ref 0–1)
BILIRUB SERPL-MCNC: 0.85 MG/DL (ref 0.2–1)
BILIRUB UR QL STRIP: NEGATIVE
BUN SERPL-MCNC: 16 MG/DL (ref 5–25)
CALCIUM SERPL-MCNC: 9 MG/DL (ref 8.4–10.2)
CHLORIDE SERPL-SCNC: 104 MMOL/L (ref 96–108)
CHOLEST SERPL-MCNC: 168 MG/DL
CLARITY UR: CLEAR
CO2 SERPL-SCNC: 24 MMOL/L (ref 21–32)
COLOR UR: NORMAL
CREAT SERPL-MCNC: 0.83 MG/DL (ref 0.6–1.3)
EOSINOPHIL # BLD AUTO: 0.11 THOUSAND/ÂΜL (ref 0–0.61)
EOSINOPHIL NFR BLD AUTO: 1 % (ref 0–6)
ERYTHROCYTE [DISTWIDTH] IN BLOOD BY AUTOMATED COUNT: 14 % (ref 11.6–15.1)
GFR SERPL CREATININE-BSD FRML MDRD: 83 ML/MIN/1.73SQ M
GLUCOSE P FAST SERPL-MCNC: 85 MG/DL (ref 65–99)
GLUCOSE UR STRIP-MCNC: NEGATIVE MG/DL
HCT VFR BLD AUTO: 44.7 % (ref 36.5–49.3)
HDLC SERPL-MCNC: 68 MG/DL
HGB BLD-MCNC: 14.4 G/DL (ref 12–17)
HGB UR QL STRIP.AUTO: NEGATIVE
IMM GRANULOCYTES # BLD AUTO: 0.06 THOUSAND/UL (ref 0–0.2)
IMM GRANULOCYTES NFR BLD AUTO: 1 % (ref 0–2)
KETONES UR STRIP-MCNC: NEGATIVE MG/DL
LDLC SERPL CALC-MCNC: 89 MG/DL (ref 0–100)
LEUKOCYTE ESTERASE UR QL STRIP: NEGATIVE
LYMPHOCYTES # BLD AUTO: 1.2 THOUSANDS/ÂΜL (ref 0.6–4.47)
LYMPHOCYTES NFR BLD AUTO: 16 % (ref 14–44)
MCH RBC QN AUTO: 31.9 PG (ref 26.8–34.3)
MCHC RBC AUTO-ENTMCNC: 32.2 G/DL (ref 31.4–37.4)
MCV RBC AUTO: 99 FL (ref 82–98)
MONOCYTES # BLD AUTO: 0.9 THOUSAND/ÂΜL (ref 0.17–1.22)
MONOCYTES NFR BLD AUTO: 12 % (ref 4–12)
NEUTROPHILS # BLD AUTO: 5.33 THOUSANDS/ÂΜL (ref 1.85–7.62)
NEUTS SEG NFR BLD AUTO: 69 % (ref 43–75)
NITRITE UR QL STRIP: NEGATIVE
NONHDLC SERPL-MCNC: 100 MG/DL
NRBC BLD AUTO-RTO: 0 /100 WBCS
PH UR STRIP.AUTO: 6 [PH]
PLATELET # BLD AUTO: 254 THOUSANDS/UL (ref 149–390)
PMV BLD AUTO: 10 FL (ref 8.9–12.7)
POTASSIUM SERPL-SCNC: 4.4 MMOL/L (ref 3.5–5.3)
PROT SERPL-MCNC: 7.1 G/DL (ref 6.4–8.4)
PROT UR STRIP-MCNC: NEGATIVE MG/DL
RBC # BLD AUTO: 4.51 MILLION/UL (ref 3.88–5.62)
SODIUM SERPL-SCNC: 138 MMOL/L (ref 135–147)
SP GR UR STRIP.AUTO: 1.02 (ref 1–1.03)
TRIGL SERPL-MCNC: 53 MG/DL
UROBILINOGEN UR STRIP-ACNC: <2 MG/DL
WBC # BLD AUTO: 7.64 THOUSAND/UL (ref 4.31–10.16)

## 2024-08-09 PROCEDURE — 85025 COMPLETE CBC W/AUTO DIFF WBC: CPT

## 2024-08-09 PROCEDURE — 80061 LIPID PANEL: CPT

## 2024-08-09 PROCEDURE — 36415 COLL VENOUS BLD VENIPUNCTURE: CPT

## 2024-08-09 PROCEDURE — 80053 COMPREHEN METABOLIC PANEL: CPT

## 2024-08-09 PROCEDURE — 81003 URINALYSIS AUTO W/O SCOPE: CPT

## 2024-08-09 PROCEDURE — 82306 VITAMIN D 25 HYDROXY: CPT

## 2024-08-14 ENCOUNTER — OFFICE VISIT (OUTPATIENT)
Dept: INTERNAL MEDICINE CLINIC | Age: 79
End: 2024-08-14
Payer: COMMERCIAL

## 2024-08-14 VITALS
WEIGHT: 122 LBS | SYSTOLIC BLOOD PRESSURE: 118 MMHG | TEMPERATURE: 98.1 F | DIASTOLIC BLOOD PRESSURE: 70 MMHG | BODY MASS INDEX: 19.15 KG/M2 | HEART RATE: 58 BPM | OXYGEN SATURATION: 99 % | HEIGHT: 67 IN

## 2024-08-14 DIAGNOSIS — J44.9 COPD, SEVERE (HCC): Primary | ICD-10-CM

## 2024-08-14 DIAGNOSIS — K22.70 BARRETT'S ESOPHAGUS WITHOUT DYSPLASIA: ICD-10-CM

## 2024-08-14 DIAGNOSIS — E55.9 VITAMIN D DEFICIENCY: ICD-10-CM

## 2024-08-14 DIAGNOSIS — K21.9 GASTROESOPHAGEAL REFLUX DISEASE, UNSPECIFIED WHETHER ESOPHAGITIS PRESENT: ICD-10-CM

## 2024-08-14 PROCEDURE — G2211 COMPLEX E/M VISIT ADD ON: HCPCS | Performed by: INTERNAL MEDICINE

## 2024-08-14 PROCEDURE — 99214 OFFICE O/P EST MOD 30 MIN: CPT | Performed by: INTERNAL MEDICINE

## 2024-08-14 RX ORDER — PANTOPRAZOLE SODIUM 20 MG/1
20 TABLET, DELAYED RELEASE ORAL
Qty: 90 TABLET | Refills: 3 | Status: SHIPPED | OUTPATIENT
Start: 2024-08-14 | End: 2025-08-09

## 2024-08-14 NOTE — PROGRESS NOTES
Assessment/Plan:     Diagnoses and all orders for this visit:    COPD, severe (HCC)  Patient is followed up by the pulmonary recent CT scan results noted I noted the note from the pulmonary discussed with the patient that there are some new changes in the lung  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  Patient to stop taking the PPI long time ago  Gastroesophageal reflux disease, unspecified whether esophagitis present    He does not have any gastroesophageal reflux symptoms and he does not have any heartburns       M*Modal software was used to dictate this note.  It may contain errors with dictating incorrect words or incorrect spelling. Please contact the provider directly with any questions.    Subjective:   Chief Complaint   Patient presents with    Follow-up     6 month follow up   Labs 8/19          Depression screening        Patient ID: Richie Flowers is a 78 y.o. male.    HPI  This is a 78 years young gentleman with a history of severe COPD he is not dependent on oxygen but he have oxygen and he uses it sometimes he does get short of breath with minimum exertion and limited walking.  Coughing but mostly dry followed up by the pulmonary I reviewed the pulmonary notes also I reviewed the CT scan which was done in July it showing some new nodules he will be followed up by the pulmonary and we will follow the recommendation  Blood pressure is excellent he lost some more weight.  Gastroesophageal reflux disease and history of Smith's esophagus he stopped taking his PPI because he said that he does not have any symptoms.  He has history of MI or cerebrovascular accident and he is not a smoking anymore  The following portions of the patient's history were reviewed and updated as appropriate: allergies, current medications, past family history, past medical history, past social history, past surgical history, and problem list.    Review of Systems   Constitutional:  Positive for fatigue. Negative for appetite  "change and fever.   HENT:  Negative for congestion, ear pain, hearing loss, nosebleeds, sneezing, tinnitus and voice change.    Eyes:  Negative for pain, discharge and redness.   Respiratory:  Positive for cough and shortness of breath. Negative for chest tightness and wheezing.    Cardiovascular:  Negative for chest pain, palpitations and leg swelling.   Gastrointestinal:  Negative for abdominal pain, blood in stool, constipation, diarrhea, nausea and vomiting.   Genitourinary:  Negative for difficulty urinating, dysuria, hematuria and urgency.   Musculoskeletal:  Negative for arthralgias, back pain, gait problem and joint swelling.   Skin:  Negative for rash and wound.   Allergic/Immunologic: Negative for environmental allergies.   Neurological:  Negative for dizziness, tremors, seizures, weakness, light-headedness and numbness.   Hematological:  Negative for adenopathy. Does not bruise/bleed easily.   Psychiatric/Behavioral:  Negative for behavioral problems and confusion. The patient is not nervous/anxious.          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    Rectal adenocarcinoma (HCC)     per pt not sure of this    Right hip pain     Shortness of breath     \"with activity not if sitting or sleeping\"    Shoulder pain     left--\"feels like cracking\"    Spermatocele     \"had surgery\"         Current Outpatient Medications:     fluticasone-umeclidinium-vilanterol (Trelegy Ellipta) 200-62.5-25 mcg/actuation AEPB inhaler, Inhale 1 puff " "daily Rinse mouth after use., Disp: 180 blister, Rfl: 1    Allergies   Allergen Reactions    Mobic [Meloxicam] Rash    Penicillins Hives and Other (See Comments)     Other reaction(s): facial swelling  Reaction Unknown       Social History   Past Surgical History:   Procedure Laterality Date    CERVICAL FUSION      COLONOSCOPY      CT NEEDLE BIOPSY LUNG  07/09/2019    DENTAL IMPLANT      EGD      FL INJECTION RIGHT HIP (NON ARTHROGRAM)  11/25/2022    HERNIA REPAIR      INSERTION PROSTATE RADIATION SEED      [place] ARTHRP ACETBLR/PROX FEM PROSTC AGRFT/ALGRFT Right 4/5/2023    Procedure: ARTHROPLASTY HIP TOTAL ANTERIOR,NAVIGATED;  Surgeon: Akbar Fenton DO;  Location:  MAIN OR;  Service: Orthopedics    [place] INSJ MULTI-COMPONENT INFLATABLE PENILE PROSTH N/A 03/08/2016    Procedure: INSERTION OF THREE PIECE PENILE PROSTHESIS;  Surgeon: Davi Reddy MD;  Location:  MAIN OR;  Service: Urology    PROSTATE BIOPSY  01/06/2014    ROTATOR CUFF REPAIR Bilateral      Family History   Problem Relation Age of Onset    No Known Problems Mother     No Known Problems Father        Objective:  /70 (BP Location: Left arm, Patient Position: Sitting, Cuff Size: Standard)   Pulse 58   Temp 98.1 °F (36.7 °C) (Temporal)   Ht 5' 7\" (1.702 m)   Wt 55.3 kg (122 lb)   SpO2 99%   BMI 19.11 kg/m²        Physical Exam  Constitutional:       Appearance: He is well-developed.   HENT:      Right Ear: Tympanic membrane and external ear normal.      Left Ear: Tympanic membrane normal.   Eyes:      Conjunctiva/sclera: Conjunctivae normal.      Pupils: Pupils are equal, round, and reactive to light.   Neck:      Thyroid: No thyromegaly.      Vascular: No JVD.   Cardiovascular:      Rate and Rhythm: Normal rate and regular rhythm.      Heart sounds: Normal heart sounds.   Pulmonary:      Breath sounds: Decreased air movement present. Decreased breath sounds present.   Abdominal:      General: Bowel sounds are normal.      Palpations: " Abdomen is soft.   Musculoskeletal:         General: Normal range of motion.      Cervical back: Normal range of motion.   Lymphadenopathy:      Cervical: No cervical adenopathy.   Skin:     General: Skin is dry.   Neurological:      General: No focal deficit present.      Mental Status: He is alert and oriented to person, place, and time. Mental status is at baseline.      Deep Tendon Reflexes: Reflexes are normal and symmetric.   Psychiatric:         Mood and Affect: Mood normal.         Behavior: Behavior normal.

## 2024-08-28 ENCOUNTER — HOSPITAL ENCOUNTER (OUTPATIENT)
Dept: NON INVASIVE DIAGNOSTICS | Facility: HOSPITAL | Age: 79
Discharge: HOME | End: 2024-08-28
Attending: INTERNAL MEDICINE
Payer: COMMERCIAL

## 2024-08-28 VITALS
HEART RATE: 65 BPM | SYSTOLIC BLOOD PRESSURE: 118 MMHG | BODY MASS INDEX: 19.15 KG/M2 | WEIGHT: 122 LBS | HEIGHT: 67 IN | DIASTOLIC BLOOD PRESSURE: 70 MMHG

## 2024-08-28 DIAGNOSIS — J44.9 COPD, SEVERE (HCC): ICD-10-CM

## 2024-08-28 LAB
BSA FOR ECHO PROCEDURE: 1.64 M2
RA PRESSURE ESTIMATED: 3 MMHG
RV PSP: 42 MMHG
SL CV LV EF: 60
TR MAX PG: 39 MMHG
TR PEAK VELOCITY: 3.2 M/S

## 2024-08-28 PROCEDURE — 93321 DOPPLER ECHO F-UP/LMTD STD: CPT

## 2024-08-28 PROCEDURE — 93325 DOPPLER ECHO COLOR FLOW MAPG: CPT

## 2024-08-28 PROCEDURE — 93308 TTE F-UP OR LMTD: CPT

## 2024-08-28 PROCEDURE — 93325 DOPPLER ECHO COLOR FLOW MAPG: CPT | Performed by: INTERNAL MEDICINE

## 2024-08-28 PROCEDURE — 93321 DOPPLER ECHO F-UP/LMTD STD: CPT | Performed by: INTERNAL MEDICINE

## 2024-08-28 PROCEDURE — 93308 TTE F-UP OR LMTD: CPT | Performed by: INTERNAL MEDICINE

## 2024-08-29 DIAGNOSIS — I27.20 PULMONARY HYPERTENSION (HCC): Primary | ICD-10-CM

## 2024-08-29 NOTE — PROGRESS NOTES
Echocardiogram was reviewed.  There is some concern for RV dilation and pulmonary hypertension.  In order to move forward with bronchoscopic lung volume reduction, we must get additional information regarding the right heart and PA pressures.  PA pressure must be below 45 mmHg to consider moving forward with valves.  I asked Dr. Proctor to review echocardiogram and he agrees that additional information would be beneficial, in the form of evaluation and right heart catheterization.  I have placed a referral to have him evaluated in their office and make arrangements for RHC.  Patient is agreeable and would like to move forward with any testing necessary with his ultimate goal of being able to have BLVR.

## 2024-08-29 NOTE — LETTER
August 29, 2024     Edu Proctor MD  [address] [place] [id number]    Patient: Richie Flowers   YOB: 1945   Date of Visit: 8/29/2024       Dear Dr. Proctor:    Thank you for referring Richie Flowers to me for evaluation. Below are my notes for this consultation.    If you have questions, please do not hesitate to call me. I look forward to following your patient along with you.         Sincerely,        Ashlie Townsend DO        CC: No Recipients    Ashlie Townsend DO  8/29/2024  9:59 AM  Sign when Signing Visit  Echocardiogram was reviewed.  There is some concern for RV dilation and pulmonary hypertension.  In order to move forward with bronchoscopic lung volume reduction, we must get additional information regarding the right heart and PA pressures.  PA pressure must be below 45 mmHg to consider moving forward with valves.  I asked Dr. Proctor to review echocardiogram and he agrees that additional information would be beneficial, in the form of evaluation and right heart catheterization.  I have placed a referral to have him evaluated in their office and make arrangements for RHC.  Patient is agreeable and would like to move forward with any testing necessary with his ultimate goal of being able to have BLVR.

## 2024-09-09 NOTE — PROGRESS NOTES
"Advanced Heart Failure/Pulmonary Hypertension Outpatient Note - Richie Flowers 78 y.o. male MRN: [id number]    @ Encounter: [id number]      Assessment:  78 y.o. male PMH and acute problems listed later in this note (a partial list may also be included within 'assessment' section) presents for consultation.  I first met Richie Flowers on 9/11/24.  Referred by Ashlie Townsend DO.     Patient Active Problem List   Diagnosis    Smith esophagus    COPD, severe (HCC)    Erectile dysfunction    Esophageal reflux    Lung nodules    Hiatal hernia    Elevated prostate specific antigen (PSA)    Osteoarthritis of one hip, right    Moderate protein-calorie malnutrition (HCC)    Former smoker    Primary osteoarthritis of one hip, right    Chronic respiratory failure with hypoxia (HCC)   Quit tobacco smoking about 2 years ago  Heavy drinking many years ago  Worked at [employer] x 30 yrs, starting after his time in [employer]      Today I have reviewed all pertinent labs/imaging/data including but not limited to:        Lab Units 08/09/24  0949 07/12/23  1049 04/09/23  1136   CREATININE mg/dL 0.83 0.82 0.81         Lab Results   Component Value Date    K 4.4 08/09/2024     Lab Results   Component Value Date    HGBA1C 5.4 03/11/2023     Lab Results   Component Value Date    [id number] 4.060 (H) 02/05/2021     Lab Results   Component Value Date    LDLCALC 89 08/09/2024     No results found for: \"BNP\"   No results found for: \"NTBNP\"       TODAY'S PLAN:     09/11/24  I am meeting patient for the first time today  Warm, euvolemic  No new cardiac complaints, +ongoing fatigue and SOB. He wants to \"get out of low gear\"    Under evaluation for BLVR     Reviewed echo and all pertinent data  Mild RV dysfxn and mild-mod dilation.  Inconclusive PH signal by echo  We reviewed risksk vs benefits of RHC now - he is amenable to proceeding    No Rx changes today    Advised exercise as able    Follow up:  With me prn after " "RHC.  In addition to follow up with their other medical providers    Studies:  Today I have reviewed all pertinent patient data/labs/imaging where available, including but not limited to the below studies. This includes my independent interpretation. Selected results may be displayed here but comprehensive listing is omitted for note clarity and can be found in the epic chart.    ECG.    Echo.    Stress.    Cath.    HPI:   78 y.o. male PMH and acute problems listed later in this note (a partial list may also be included within 'assessment' section) presents for consultation.  No new CP/dizziness/palpitations/syncope.  No new unintentional weight changes.  No new leg swelling, PND, pillow orthopnea.  No new fevers, chills, cough, nausea, vomiting, diarrhea, dysuria.        ROS:  10 point ROS negative except as specified in HPI    Past Medical History:   Diagnosis Date    Arthritis     Smith's esophagus     Chronic cough     per pt since quit smoking less coughing-slightly yellow    Chronic pain disorder     hip    Colon polyp     COPD (chronic obstructive pulmonary disease) (HCC)     Dental bridge present     Esophageal reflux     GERD (gastroesophageal reflux disease)     History of kidney stones     History of radiation therapy     Inguinal hernia     \"had surgery\"    Moderate exercise     has landscape business and enjoys remodeling properties    Rectal adenocarcinoma (HCC)     per pt not sure of this    Right hip pain     Shortness of breath     \"with activity not if sitting or sleeping\"    Shoulder pain     left--\"feels like cracking\"    Spermatocele     \"had surgery\"     Patient Active Problem List   Diagnosis    Smith esophagus    COPD, severe (HCC)    Erectile dysfunction    Esophageal reflux    Lung nodules    Hiatal hernia    Elevated prostate specific antigen (PSA)    Osteoarthritis of one hip, right    Moderate protein-calorie malnutrition (HCC)    Former smoker    Primary osteoarthritis of one hip, " right    Chronic respiratory failure with hypoxia (HCC)     No current facility-administered medications for this visit.      Allergies   Allergen Reactions    Mobic [Meloxicam] Rash    Penicillins Hives and Other (See Comments)     Other reaction(s): facial swelling  Reaction Unknown     Social History     Socioeconomic History    Marital status: Single     Spouse name: Not on file    Number of children: Not on file    Years of education: Not on file    Highest education level: Not on file   Occupational History    Not on file   Tobacco Use    Smoking status: Former     Current packs/day: 0.00     Average packs/day: 0.5 packs/day for 68.0 years (34.0 ttl pk-yrs)     Types: Cigarettes     Start date:      Quit date: 2023     Years since quittin.6    Smokeless tobacco: Never   Vaping Use    Vaping status: Never Used   Substance and Sexual Activity    Alcohol use: Yes     Alcohol/week: 14.0 standard drinks of alcohol     Types: 14 Standard drinks or equivalent per week     Comment: 2 michelle meyers and coke per a day    Drug use: No    Sexual activity: Not on file     Comment: defer   Other Topics Concern    Not on file   Social History Narrative    Not on file     Social Determinants of Health     Financial Resource Strain: Low Risk  (2024)    Overall Financial Resource Strain (CARDIA)     Difficulty of Paying Living Expenses: Not hard at all   Food Insecurity: No Food Insecurity (2023)    Hunger Vital Sign     Worried About Running Out of Food in the Last Year: Never true     Ran Out of Food in the Last Year: Never true   Transportation Needs: No Transportation Needs (2024)    PRAPARE - Transportation     Lack of Transportation (Medical): No     Lack of Transportation (Non-Medical): No   Physical Activity: Not on file   Stress: Not on file   Social Connections: Not on file   Intimate Partner Violence: Not on file   Housing Stability: Low Risk  (2023)    Housing Stability Vital Sign      "Unable to Pay for Housing in the Last Year: No     Number of Places Lived in the Last Year: 1     Unstable Housing in the Last Year: No     Family History   Problem Relation Age of Onset    No Known Problems Mother     No Known Problems Father        Physical Exam:  Vitals:    09/11/24 1501   BP: 116/68   BP Location: Right arm   Patient Position: Sitting   Cuff Size: Standard   Pulse: 59   SpO2: 93%   Weight: 56.9 kg (125 lb 6.4 oz)   Height: 5' 7\" (1.702 m)     Constitutional: NAD, non toxic, thin  Ears/nose/mouth/throat: atraumatic  CV: RRR, nl S1S2, no murmurs/rubs/gallups, no JVD, no HJR  Resp: decreased sounds BL  GI: Soft, NTND  MSK: no swollen joints in exposed areas  Extr: No edema, warm LE  Pysche: Normal affect  Neuro: appropriate in conversation  Skin: dry and intact in exposed areas    Labs & Results:  Lab Results   Component Value Date    WBC 7.64 08/09/2024    HGB 14.4 08/09/2024    HCT 44.7 08/09/2024    MCV 99 (H) 08/09/2024     08/09/2024     Lab Results   Component Value Date    SODIUM 138 08/09/2024    K 4.4 08/09/2024     08/09/2024    CO2 24 08/09/2024    BUN 16 08/09/2024    CREATININE 0.83 08/09/2024    GLUC 115 04/09/2023    CALCIUM 9.0 08/09/2024       Counseling / Coordination of Care  Greater than 50% of total time was spent with the patient and / or family counseling and / or coordination of care. Discussion included diagnoses, most recent studies and any changes in treatment.    Thank you for the opportunity to participate in the care of this patient.    Edu Proctor MD  Attending Physician  Advanced Heart Failure and Transplant Cardiology  [facility]  "

## 2024-09-11 ENCOUNTER — CONSULT (OUTPATIENT)
Dept: CARDIOLOGY CLINIC | Facility: CLINIC | Age: 79
End: 2024-09-11
Payer: COMMERCIAL

## 2024-09-11 VITALS
SYSTOLIC BLOOD PRESSURE: 116 MMHG | HEIGHT: 67 IN | OXYGEN SATURATION: 93 % | BODY MASS INDEX: 19.68 KG/M2 | HEART RATE: 59 BPM | DIASTOLIC BLOOD PRESSURE: 68 MMHG | WEIGHT: 125.4 LBS

## 2024-09-11 DIAGNOSIS — R91.8 LUNG NODULES: ICD-10-CM

## 2024-09-11 DIAGNOSIS — I27.20 PULMONARY HYPERTENSION (HCC): Primary | ICD-10-CM

## 2024-09-11 DIAGNOSIS — J44.9 COPD, SEVERE (HCC): ICD-10-CM

## 2024-09-11 PROCEDURE — 99204 OFFICE O/P NEW MOD 45 MIN: CPT | Performed by: STUDENT IN AN ORGANIZED HEALTH CARE EDUCATION/TRAINING PROGRAM

## 2024-09-12 ENCOUNTER — TELEPHONE (OUTPATIENT)
Dept: CARDIOLOGY CLINIC | Facility: CLINIC | Age: 79
End: 2024-09-12

## 2024-09-12 DIAGNOSIS — I27.20 PULMONARY HYPERTENSION (HCC): Primary | ICD-10-CM

## 2024-09-12 DIAGNOSIS — J44.9 COPD, SEVERE (HCC): ICD-10-CM

## 2024-09-12 DIAGNOSIS — Q24.9: ICD-10-CM

## 2024-09-12 DIAGNOSIS — I27.20 PORTOPULMONARY HYPERTENSION (HCC): ICD-10-CM

## 2024-09-12 DIAGNOSIS — R91.8 LUNG NODULES: ICD-10-CM

## 2024-09-12 DIAGNOSIS — I27.29: ICD-10-CM

## 2024-09-12 DIAGNOSIS — K76.6 PORTOPULMONARY HYPERTENSION (HCC): ICD-10-CM

## 2024-09-12 NOTE — TELEPHONE ENCOUNTER
----- Message from Edu Proctor MD sent at 9/11/2024  3:28 PM EDT -----  Regarding: Please schedule this pt for C  Please schedule this case (and all my cases) to start at 7:30am at [facility].    Please do not schedule case for a Monday or Friday if at all possible.  Please do not schedule it for the first Tuesday of the month (many meetings).    If I already have a case on a given day, please do not schedule a second one for the same morning unless there is no other option that week.    Please do not schedule more than 2 cases in 1 morning (2 is max in a given day).    These schedule constraints are helpful since I am almost always on inpatient service the same days as cath cases.    The patient should have basic labs as ordered including PTT, PT, INR coagulation studies beforehand. Please ensure these are in especially the INR, as this has sometimes been omitted in past despite the orders being in.      Thanks!    - Edu

## 2024-09-12 NOTE — LETTER
2024       Richie Flowers              : 1945        MRN: [id number]  [address] [place] [id number]       Procedure Name: RIGHT HEART CATHETERIZATION    Procedure date: 2024    Location: Critical access hospital      [facility] will contact you the day prior to your procedure, usually between 4PM - 6PM to instruct you on the time and place to report. If you do not hear from a [facility]  by 6PM the evening prior to your procedure, please contact the campus that you are scheduled at.      [place]: [address] [place] [address] - Short Stay Center [phone]    You may have NOTHING to eat or drink from midnight the night prior to your procedure. You may have a minimal amount of water with your morning medications.     DO NOT stop taking Plavix or Aspirin unless advised otherwise.    Arrange for a responsible person to drive you to and from the hospital.    Please shower/bathe the night before your procedure and do not use powders or lotions.    Please notify us if you have been admitted to the hospital within the past 30 days.    Bring a list of daily medications, vitamins, minerals, herbals and nutritional supplements you take. Include dosage and time you take them each day.    If packing an overnight bag, pack minimal clothing, you will be given hospital sleepwear. Do not bring money, valuables or jewelry. Wedding band is OK.    If you use CPAP machine, bring it to the hospital.      Have your Photo ID and Insurance cards with you.    DO NOT take any diabetic medication, including insulin, the morning of the procedure. Oral diabetic medications may include: Glucophage, Prandin, Glyburide, Micronase, Avandia, Glocovance, Precose, Glynase y Starlix.            You should continue to take your morning dose of heart and/or blood pressure medications with a sip of water UNLESS ADVISED OTHERWISE.    Special Instructions:    Medication hold:   NO  MEDICATION HOLDS     Blood work to be done BETWEEN 9/22/24 & 10/15/24   (FASTING)      Thank you,  Leigha Segovia  Surgery Coordinator  [facility]   [address] [place] [address]  [place] [address]  Teams: [phone]

## 2024-09-12 NOTE — TELEPHONE ENCOUNTER
Patient scheduled for RIGHT Heart Cath on 10/22/24 at  [facility] with Dr. MUÑOZ.      9/12/24 Mailed patient instructions.     Patient aware of all general instructions.    Medication holds:  NO MED HOLDS     Labs to be done BETWEEN 9/22/24 & 10/15/24  CMP / CBC (fasting 8 hours)

## 2024-10-08 ENCOUNTER — APPOINTMENT (OUTPATIENT)
Dept: LAB | Facility: IMAGING CENTER | Age: 79
End: 2024-10-08
Payer: COMMERCIAL

## 2024-10-08 DIAGNOSIS — I27.29 PULMONARY HYPERTENSIVE VENOUS DISEASE (HCC): ICD-10-CM

## 2024-10-08 DIAGNOSIS — K76.6 PORTAL HYPERTENSION (HCC): ICD-10-CM

## 2024-10-08 DIAGNOSIS — Q24.9 CONGENITAL HEART DISEASE: ICD-10-CM

## 2024-10-08 DIAGNOSIS — I27.20 PORTOPULMONARY HYPERTENSION (HCC): ICD-10-CM

## 2024-10-08 DIAGNOSIS — K76.6 PORTOPULMONARY HYPERTENSION (HCC): ICD-10-CM

## 2024-10-08 DIAGNOSIS — R91.8 LUNG MASS: ICD-10-CM

## 2024-10-08 DIAGNOSIS — J44.9 CHRONIC OBSTRUCTIVE PULMONARY DISEASE, UNSPECIFIED COPD TYPE (HCC): ICD-10-CM

## 2024-10-08 LAB
ALBUMIN SERPL BCG-MCNC: 3.9 G/DL (ref 3.5–5)
ALP SERPL-CCNC: 62 U/L (ref 34–104)
ALT SERPL W P-5'-P-CCNC: 13 U/L (ref 7–52)
ANION GAP SERPL CALCULATED.3IONS-SCNC: 9 MMOL/L (ref 4–13)
APTT PPP: 30 SECONDS (ref 23–34)
AST SERPL W P-5'-P-CCNC: 24 U/L (ref 13–39)
BASOPHILS # BLD AUTO: 0.03 THOUSANDS/ΜL (ref 0–0.1)
BASOPHILS NFR BLD AUTO: 0 % (ref 0–1)
BILIRUB SERPL-MCNC: 0.56 MG/DL (ref 0.2–1)
BUN SERPL-MCNC: 18 MG/DL (ref 5–25)
CALCIUM SERPL-MCNC: 9 MG/DL (ref 8.4–10.2)
CHLORIDE SERPL-SCNC: 102 MMOL/L (ref 96–108)
CO2 SERPL-SCNC: 26 MMOL/L (ref 21–32)
CREAT SERPL-MCNC: 0.81 MG/DL (ref 0.6–1.3)
EOSINOPHIL # BLD AUTO: 0.16 THOUSAND/ΜL (ref 0–0.61)
EOSINOPHIL NFR BLD AUTO: 2 % (ref 0–6)
ERYTHROCYTE [DISTWIDTH] IN BLOOD BY AUTOMATED COUNT: 13.8 % (ref 11.6–15.1)
GFR SERPL CREATININE-BSD FRML MDRD: 83 ML/MIN/1.73SQ M
GLUCOSE P FAST SERPL-MCNC: 96 MG/DL (ref 65–99)
HCT VFR BLD AUTO: 47.6 % (ref 36.5–49.3)
HGB BLD-MCNC: 15.1 G/DL (ref 12–17)
IMM GRANULOCYTES # BLD AUTO: 0.05 THOUSAND/UL (ref 0–0.2)
IMM GRANULOCYTES NFR BLD AUTO: 1 % (ref 0–2)
INR PPP: 0.96 (ref 0.85–1.19)
LYMPHOCYTES # BLD AUTO: 1.78 THOUSANDS/ΜL (ref 0.6–4.47)
LYMPHOCYTES NFR BLD AUTO: 24 % (ref 14–44)
MCH RBC QN AUTO: 32.1 PG (ref 26.8–34.3)
MCHC RBC AUTO-ENTMCNC: 31.7 G/DL (ref 31.4–37.4)
MCV RBC AUTO: 101 FL (ref 82–98)
MONOCYTES # BLD AUTO: 0.78 THOUSAND/ΜL (ref 0.17–1.22)
MONOCYTES NFR BLD AUTO: 10 % (ref 4–12)
NEUTROPHILS # BLD AUTO: 4.7 THOUSANDS/ΜL (ref 1.85–7.62)
NEUTS SEG NFR BLD AUTO: 63 % (ref 43–75)
NRBC BLD AUTO-RTO: 0 /100 WBCS
PLATELET # BLD AUTO: 261 THOUSANDS/UL (ref 149–390)
PMV BLD AUTO: 9.6 FL (ref 8.9–12.7)
POTASSIUM SERPL-SCNC: 4.5 MMOL/L (ref 3.5–5.3)
PROT SERPL-MCNC: 6.8 G/DL (ref 6.4–8.4)
PROTHROMBIN TIME: 13.1 SECONDS (ref 12.3–15)
RBC # BLD AUTO: 4.71 MILLION/UL (ref 3.88–5.62)
SODIUM SERPL-SCNC: 137 MMOL/L (ref 135–147)
WBC # BLD AUTO: 7.5 THOUSAND/UL (ref 4.31–10.16)

## 2024-10-08 PROCEDURE — 36415 COLL VENOUS BLD VENIPUNCTURE: CPT

## 2024-10-08 PROCEDURE — 85610 PROTHROMBIN TIME: CPT

## 2024-10-08 PROCEDURE — 85025 COMPLETE CBC W/AUTO DIFF WBC: CPT

## 2024-10-08 PROCEDURE — 80053 COMPREHEN METABOLIC PANEL: CPT

## 2024-10-08 PROCEDURE — 85730 THROMBOPLASTIN TIME PARTIAL: CPT

## 2024-10-21 NOTE — TELEPHONE ENCOUNTER
Patient stopped by the office needing a refill sent to  [facility] - [address] [place] [address]     NOV: 2/14/24   show

## 2024-10-22 ENCOUNTER — HOSPITAL ENCOUNTER (OUTPATIENT)
Facility: HOSPITAL | Age: 79
Setting detail: OUTPATIENT SURGERY
Discharge: HOME | End: 2024-10-22
Attending: STUDENT IN AN ORGANIZED HEALTH CARE EDUCATION/TRAINING PROGRAM | Admitting: STUDENT IN AN ORGANIZED HEALTH CARE EDUCATION/TRAINING PROGRAM
Payer: COMMERCIAL

## 2024-10-22 VITALS
OXYGEN SATURATION: 98 % | TEMPERATURE: 98.4 F | SYSTOLIC BLOOD PRESSURE: 140 MMHG | WEIGHT: 125 LBS | DIASTOLIC BLOOD PRESSURE: 77 MMHG | RESPIRATION RATE: 18 BRPM | BODY MASS INDEX: 18.94 KG/M2 | HEIGHT: 68 IN | HEART RATE: 60 BPM

## 2024-10-22 DIAGNOSIS — I27.20 PULMONARY HYPERTENSION (HCC): ICD-10-CM

## 2024-10-22 PROCEDURE — 93451 RIGHT HEART CATH: CPT | Performed by: STUDENT IN AN ORGANIZED HEALTH CARE EDUCATION/TRAINING PROGRAM

## 2024-10-22 PROCEDURE — 82810 BLOOD GASES O2 SAT ONLY: CPT | Performed by: STUDENT IN AN ORGANIZED HEALTH CARE EDUCATION/TRAINING PROGRAM

## 2024-10-22 PROCEDURE — C1894 INTRO/SHEATH, NON-LASER: HCPCS | Performed by: STUDENT IN AN ORGANIZED HEALTH CARE EDUCATION/TRAINING PROGRAM

## 2024-10-22 PROCEDURE — C1769 GUIDE WIRE: HCPCS | Performed by: STUDENT IN AN ORGANIZED HEALTH CARE EDUCATION/TRAINING PROGRAM

## 2024-10-22 PROCEDURE — NC001 PR NO CHARGE: Performed by: STUDENT IN AN ORGANIZED HEALTH CARE EDUCATION/TRAINING PROGRAM

## 2024-10-22 RX ORDER — FUROSEMIDE 20 MG/1
20 TABLET ORAL DAILY
Qty: 90 TABLET | Refills: 3 | Status: SHIPPED | OUTPATIENT
Start: 2024-10-22

## 2024-10-22 RX ORDER — POTASSIUM CHLORIDE 1500 MG/1
20 TABLET, EXTENDED RELEASE ORAL DAILY
Qty: 90 TABLET | Refills: 3 | Status: SHIPPED | OUTPATIENT
Start: 2024-10-22

## 2024-10-22 RX ORDER — LIDOCAINE HYDROCHLORIDE 10 MG/ML
INJECTION, SOLUTION EPIDURAL; INFILTRATION; INTRACAUDAL; PERINEURAL CODE/TRAUMA/SEDATION MEDICATION
Status: DISCONTINUED | OUTPATIENT
Start: 2024-10-22 | End: 2024-10-22 | Stop reason: HOSPADM

## 2024-10-22 NOTE — DISCHARGE INSTR - AVS FIRST PAGE
You came to the hospital for a right heart catheterization.  The test showed you have elevated pressures in the left and right side of your heart.  Please start taking lasix 20mg daily as a diuretic and potassium 20 meq daily to prevent low potassium.

## 2024-10-22 NOTE — H&P
"Advanced Heart Failure/Pulmonary Hypertension Outpatient Note - Richie Flowers 78 y.o. male MRN: [id number]    @ Encounter: [id number]      Assessment:  78 y.o. male PMH and acute problems listed later in this note (a partial list may also be included within 'assessment' section) presents for consultation and consideration of RHC per pulm team.  I first met Richie Flowers on 9/11/24.  Referred by No ref. provider found.     Patient Active Problem List   Diagnosis    Smith esophagus    COPD, severe (HCC)    Erectile dysfunction    Esophageal reflux    Lung nodules    Hiatal hernia    Elevated prostate specific antigen (PSA)    Osteoarthritis of one hip, right    Moderate protein-calorie malnutrition (HCC)    Former smoker    Primary osteoarthritis of one hip, right    Chronic respiratory failure with hypoxia (HCC)   Quit tobacco smoking about 2 years ago  Heavy drinking many years ago  Worked at [employer] x 30 yrs, starting after his time in [employer]      Today I have reviewed all pertinent labs/imaging/data including but not limited to:        Lab Units 10/08/24  0849 08/09/24  0949 07/12/23  1049   CREATININE mg/dL 0.81 0.83 0.82         Lab Results   Component Value Date    K 4.5 10/08/2024     Lab Results   Component Value Date    HGBA1C 5.4 03/11/2023     Lab Results   Component Value Date    [id number] 4.060 (H) 02/05/2021     Lab Results   Component Value Date    LDLCALC 89 08/09/2024     No results found for: \"BNP\"   No results found for: \"NTBNP\"       TODAY'S PLAN:     10/22/24    Plan for elective RHC    Under evaluation for BLVR     Key info from my prior notes:    Reviewed echo and all pertinent data  Mild RV dysfxn and mild-mod dilation.  Inconclusive PH signal by echo  We reviewed risksk vs benefits of RHC now - he is amenable to proceeding    No Rx changes today    Advised exercise as able    Follow up:  With me prn after RHC.  In addition to follow up with their other medical " "providers    Studies:  Today I have reviewed all pertinent patient data/labs/imaging where available, including but not limited to the below studies. This includes my independent interpretation. Selected results may be displayed here but comprehensive listing is omitted for note clarity and can be found in the epic chart.    ECG.    Echo.    Stress.    Cath.    HPI:   78 y.o. male PMH and acute problems listed later in this note (a partial list may also be included within 'assessment' section) presents for consultation.  No new CP/dizziness/palpitations/syncope.  No new unintentional weight changes.  No new leg swelling, PND, pillow orthopnea.  No new fevers, chills, cough, nausea, vomiting, diarrhea, dysuria.    Interval History:  As noted in 'plan' section above and prior epic chart notes.    No new CP/SOB/dizziness/palpitations/syncope.  No new fatigue.  No new unintentional weight changes.  No new leg swelling, PND, pillow orthopnea.  No new fevers, chills, cough, nausea, vomiting, diarrhea, dysuria.      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    Rectal adenocarcinoma (HCC)     per pt not sure of this    Right hip pain     Shortness of breath     \"with activity not if sitting or sleeping\"    Shoulder pain     left--\"feels like cracking\"    Spermatocele     \"had surgery\"     Patient Active Problem List   Diagnosis    Smith esophagus    COPD, severe (HCC)    Erectile " dysfunction    Esophageal reflux    Lung nodules    Hiatal hernia    Elevated prostate specific antigen (PSA)    Osteoarthritis of one hip, right    Moderate protein-calorie malnutrition (HCC)    Former smoker    Primary osteoarthritis of one hip, right    Chronic respiratory failure with hypoxia (HCC)     No current facility-administered medications for this encounter.      Allergies   Allergen Reactions    Mobic [Meloxicam] Rash    Penicillins Hives and Other (See Comments)     Other reaction(s): facial swelling  Reaction Unknown     Social History     Socioeconomic History    Marital status: Single     Spouse name: Not on file    Number of children: Not on file    Years of education: Not on file    Highest education level: Not on file   Occupational History    Not on file   Tobacco Use    Smoking status: Former     Current packs/day: 0.00     Average packs/day: 0.5 packs/day for 68.0 years (34.0 ttl pk-yrs)     Types: Cigarettes     Start date:      Quit date: 2023     Years since quittin.7    Smokeless tobacco: Never   Vaping Use    Vaping status: Never Used   Substance and Sexual Activity    Alcohol use: Yes     Alcohol/week: 14.0 standard drinks of alcohol     Types: 14 Standard drinks or equivalent per week     Comment: 2 michelle mira and coomayra per a day    Drug use: No    Sexual activity: Not on file     Comment: defer   Other Topics Concern    Not on file   Social History Narrative    Not on file     Social Determinants of Health     Financial Resource Strain: Low Risk  (2024)    Overall Financial Resource Strain (CARDIA)     Difficulty of Paying Living Expenses: Not hard at all   Food Insecurity: No Food Insecurity (2023)    Hunger Vital Sign     Worried About Running Out of Food in the Last Year: Never true     Ran Out of Food in the Last Year: Never true   Transportation Needs: No Transportation Needs (2024)    PRAPARE - Transportation     Lack of Transportation (Medical): No      "Lack of Transportation (Non-Medical): No   Physical Activity: Not on file   Stress: Not on file   Social Connections: Not on file   Intimate Partner Violence: Not on file   Housing Stability: Low Risk  (4/6/2023)    Housing Stability Vital Sign     Unable to Pay for Housing in the Last Year: No     Number of Places Lived in the Last Year: 1     Unstable Housing in the Last Year: No     Family History   Problem Relation Age of Onset    No Known Problems Mother     No Known Problems Father        Physical Exam:  Vitals:    10/22/24 0700   BP: 146/70   Pulse: 57   Resp: 19   Temp: 98.3 °F (36.8 °C)   TempSrc: Temporal   SpO2: 95%   Weight: 56.7 kg (125 lb)   Height: 5' 8\" (1.727 m)     Constitutional: NAD, non toxic, thin  Ears/nose/mouth/throat: atraumatic  CV: RRR, nl S1S2, no murmurs/rubs/gallups, no JVD, no HJR  Resp: decreased sounds BL  GI: Soft, NTND  MSK: no swollen joints in exposed areas  Extr: No edema, warm LE  Pysche: Normal affect  Neuro: appropriate in conversation  Skin: dry and intact in exposed areas    Labs & Results:  Lab Results   Component Value Date    WBC 7.50 10/08/2024    HGB 15.1 10/08/2024    HCT 47.6 10/08/2024     (H) 10/08/2024     10/08/2024     Lab Results   Component Value Date    SODIUM 137 10/08/2024    K 4.5 10/08/2024     10/08/2024    CO2 26 10/08/2024    BUN 18 10/08/2024    CREATININE 0.81 10/08/2024    GLUC 115 04/09/2023    CALCIUM 9.0 10/08/2024       Counseling / Coordination of Care  Greater than 50% of total time was spent with the patient and / or family counseling and / or coordination of care. Discussion included diagnoses, most recent studies and any changes in treatment.    Thank you for the opportunity to participate in the care of this patient.    Edu Proctor MD  Attending Physician  Advanced Heart Failure and Transplant Cardiology  [facility]  "

## 2024-10-23 DIAGNOSIS — I27.20 PULMONARY HYPERTENSION (HCC): Primary | ICD-10-CM

## 2024-11-11 ENCOUNTER — TELEPHONE (OUTPATIENT)
Dept: PULMONOLOGY | Facility: CLINIC | Age: 79
End: 2024-11-11

## 2024-11-11 NOTE — TELEPHONE ENCOUNTER
Left detailed VM re: rationale for optimizing pulmonary HTN before we do valves. Offered to see him sooner if he needs more discussion

## 2024-11-11 NOTE — TELEPHONE ENCOUNTER
"Patient returning call:    Patient requesting to speak with Dr. Townsend. Says that he didn't receive a voicemail since his local towers are having issues.    \"I feel like I'm going backwards. I'm never going to qualify for the [place] valves\"   Explained the Pulmonary HTN optimization. \"I don't know if I will be around by the time this is all done\".    No sooner appointment visible on my end.     Callback number: [phone]   "

## 2024-11-11 NOTE — TELEPHONE ENCOUNTER
Patient has called in pretty upset, he states he feels he is being strung along and he really wants to move forward before he gets worse. Patient states he is coughing up mucus 8-9 times a day. Patient states he would like to speak with  directly to discuss his concerns on waiting for more test.     Please advise

## 2024-11-12 ENCOUNTER — NURSE TRIAGE (OUTPATIENT)
Age: 79
End: 2024-11-12

## 2024-11-12 NOTE — TELEPHONE ENCOUNTER
"Reason for Conversation: Pt is s/p Cardiac RHC on 10/22/2024. He was prescribed lasix 20mg for pulmonary HTN. Also follow sup with pulmonary. Received call from pt stating ever since starting on the lasix, he feels his cough and sob with exertion is getting worse. He stated due to his COPD, he does usually have a cough and sob with exertion but not this bad. He stated he is coughing nonstop and is bringing up thick yellow mucous. Denies chest pain, wheezing, fever, chills. States he does have a sore throat and is painful when swallowing.     Pt would like to know his next steps.    Advised will send a message to the provider to review and advise. Advised pt to also call his pulmonary provider to review and advise. He voiced understanding. Advised pt to report to the ED with any worsening s/s.       Pain: No    Risk Factors: Heart Failure    Recent relevant testing and date of testing:  cardiac cath 10/22    Medication: lasix 20mg, potassium 20meq    Upcoming Office Visit: Yes 1/3/2025    Last Office Visit: 9/11/2024    Reason for Disposition   Cough has been present for > 3 weeks    Answer Assessment - Initial Assessment Questions  1. ONSET: \"When did the cough begin?\"       Pt states once he started taking the lasix, he feels his cough is getting worse   2. SEVERITY: \"How bad is the cough today?\"       Coughing all day   3. SPUTUM: \"Describe the color of your sputum\" (e.g., none, dry cough; clear, white, yellow, green)      Productive cough. States the mucous is thick, heavy, and yellow which happens every half hour  4. HEMOPTYSIS: \"Are you coughing up any blood?\" If Yes, ask: \"How much?\" (e.g., flecks, streaks, tablespoons, etc.)      denies  5. DIFFICULTY BREATHING: \"Are you having difficulty breathing?\" If Yes, ask: \"How bad is it?\" (e.g., mild, moderate, severe)       Pt has COPD. Has SOB with exertion and feels is getting worse   6. FEVER: \"Do you have a fever?\" If Yes, ask: \"What is your temperature, how was " "it measured, and when did it start?\"      Denies   7. CARDIAC HISTORY: \"Do you have any history of heart disease?\" (e.g., heart attack, congestive heart failure)       HF  8. LUNG HISTORY: \"Do you have any history of lung disease?\"  (e.g., pulmonary embolus, asthma, emphysema)      COPD, pulmonary HTN  9. PE RISK FACTORS: \"Do you have a history of blood clots?\" (or: recent major surgery, recent prolonged travel, bedridden)      Pulmonary HTN  10. OTHER SYMPTOMS: \"Do you have any other symptoms?\" (e.g., runny nose, wheezing, chest pain)        Denies chest pain nor wheezing. States has a runny nose and sore throat    Protocols used: Cough-Adult-[place]    "

## 2024-11-13 ENCOUNTER — OFFICE VISIT (OUTPATIENT)
Dept: PULMONOLOGY | Facility: CLINIC | Age: 79
End: 2024-11-13
Payer: COMMERCIAL

## 2024-11-13 ENCOUNTER — TELEPHONE (OUTPATIENT)
Dept: PULMONOLOGY | Facility: CLINIC | Age: 79
End: 2024-11-13

## 2024-11-13 VITALS
SYSTOLIC BLOOD PRESSURE: 110 MMHG | DIASTOLIC BLOOD PRESSURE: 60 MMHG | TEMPERATURE: 98.2 F | OXYGEN SATURATION: 94 % | WEIGHT: 121.1 LBS | BODY MASS INDEX: 18.41 KG/M2 | HEART RATE: 79 BPM

## 2024-11-13 DIAGNOSIS — R91.8 LUNG NODULES: ICD-10-CM

## 2024-11-13 DIAGNOSIS — J96.11 CHRONIC RESPIRATORY FAILURE WITH HYPOXIA (HCC): ICD-10-CM

## 2024-11-13 DIAGNOSIS — J20.9 ACUTE BRONCHITIS, UNSPECIFIED ORGANISM: Primary | ICD-10-CM

## 2024-11-13 DIAGNOSIS — F17.211 CIGARETTE NICOTINE DEPENDENCE IN REMISSION: ICD-10-CM

## 2024-11-13 DIAGNOSIS — J44.9 COPD, SEVERE (HCC): ICD-10-CM

## 2024-11-13 DIAGNOSIS — I27.20 PULMONARY HYPERTENSION (HCC): ICD-10-CM

## 2024-11-13 PROCEDURE — G2211 COMPLEX E/M VISIT ADD ON: HCPCS | Performed by: INTERNAL MEDICINE

## 2024-11-13 PROCEDURE — 99214 OFFICE O/P EST MOD 30 MIN: CPT | Performed by: INTERNAL MEDICINE

## 2024-11-13 RX ORDER — ALBUTEROL SULFATE 0.83 MG/ML
2.5 SOLUTION RESPIRATORY (INHALATION) EVERY 6 HOURS PRN
Qty: 25 ML | Refills: 3 | Status: SHIPPED | OUTPATIENT
Start: 2024-11-13

## 2024-11-13 RX ORDER — PREDNISONE 20 MG/1
40 TABLET ORAL DAILY
Qty: 10 TABLET | Refills: 0 | Status: SHIPPED | OUTPATIENT
Start: 2024-11-13 | End: 2024-11-18

## 2024-11-13 RX ORDER — AZITHROMYCIN 500 MG/1
500 TABLET, FILM COATED ORAL EVERY 24 HOURS
Qty: 3 TABLET | Refills: 0 | Status: SHIPPED | OUTPATIENT
Start: 2024-11-13 | End: 2024-11-16

## 2024-11-13 NOTE — PATIENT INSTRUCTIONS
Please take azithromycin 500mg daily for 3 days as well as prednisone 40mg daily for 5 days for acute bronchitis.   I have also sent a prescription for a nebulizer to use as needed for wheezing and shortness of breath.   Please follow up with Dr. Townsend

## 2024-11-13 NOTE — PROGRESS NOTES
Pulmonary Follow Up Note   Richie Flowers 78 y.o. male MRN: [id number]  11/13/2024      Assessment/Plan  1. Pulmonary hypertension (HCC)  2. COPD, severe (HCC)  -     albuterol (2.5 mg/3 mL) 0.083 % nebulizer solution; Take 3 mL (2.5 mg total) by nebulization every 6 (six) hours as needed for wheezing or shortness of breath  -     Nebulizer Supplies  3. Chronic bronchitis, unspecified chronic bronchitis type (HCC)  4. Lung nodules  5. Chronic respiratory failure with hypoxia (HCC)  6. Cigarette nicotine dependence in remission  7. Other specified chronic obstructive pulmonary disease (HCC)  8. Acute bronchitis, unspecified organism  -     predniSONE 20 mg tablet; Take 2 tablets (40 mg total) by mouth daily  -     azithromycin (ZITHROMAX) 500 MG tablet; Take 1 tablet (500 mg total) by mouth every 24 hours for 3 days  -     albuterol (2.5 mg/3 mL) 0.083 % nebulizer solution; Take 3 mL (2.5 mg total) by nebulization every 6 (six) hours as needed for wheezing or shortness of breath  -     Nebulizer Supplies          Diagnoses and all orders for this visit:    Pulmonary hypertension (HCC)    COPD, severe (HCC)  -     albuterol (2.5 mg/3 mL) 0.083 % nebulizer solution; Take 3 mL (2.5 mg total) by nebulization every 6 (six) hours as needed for wheezing or shortness of breath  -     Nebulizer Supplies    Chronic bronchitis, unspecified chronic bronchitis type (HCC)    Lung nodules    Chronic respiratory failure with hypoxia (HCC)    Cigarette nicotine dependence in remission    Other specified chronic obstructive pulmonary disease (HCC)    Acute bronchitis, unspecified organism  -     predniSONE 20 mg tablet; Take 2 tablets (40 mg total) by mouth daily  -     azithromycin (ZITHROMAX) 500 MG tablet; Take 1 tablet (500 mg total) by mouth every 24 hours for 3 days  -     albuterol (2.5 mg/3 mL) 0.083 % nebulizer solution; Take 3 mL (2.5 mg total) by nebulization every 6 (six) hours as needed for wheezing or shortness of  breath  -     Nebulizer Supplies        Return if symptoms worsen or fail to improve.    History of Present Illness   HPI:  Richie Flowers is a 78 y.o. male with past medical history of COPD, chronic respiratory failure on 2L with exertion and lung nodules, who presents for acute visit for shortness of breath and cough.     Patient is currently being evaluated for bronchoscopic lung volume reduction. He has evidence of pulmonary hypertension and is pending a repeat echocardiogram in January prior to proceeding with BLVR.    He reports he is coughing up mucus every 30 minutes. Started last Thursday. He has throat pain from coughing and right sided nasal congestion and rhinorrhea. He associated this with lasix which he started a month ago. He stopped that because he is attributing that to his symptoms. PA pressure must be below 45 mmHg to consider moving forward with valves.    He tried to stop the trelegy but realized that he got worse without it.     Does not use rescue inhaler    He has had improvement with steroids in the past.     He is very concerned that he is getting weaker and will never qualify for zephyr valve.     Quit tobacco smoking about 2 years ago  Heavy drinking many years ago  Worked at [employer] x 30 yrs, starting after his time in [employer]    Review of Systems   Constitutional:  Positive for activity change and fatigue.   HENT:  Positive for congestion and rhinorrhea.    Respiratory:  Positive for cough and shortness of breath.    Cardiovascular:  Negative for chest pain, palpitations and leg swelling.   Gastrointestinal:  Negative for abdominal pain.       Historical Information   Past Medical History:   Diagnosis Date    Arthritis     Smith's esophagus     Chronic cough     per pt since quit smoking less coughing-slightly yellow    Chronic pain disorder     hip    Colon polyp     COPD (chronic obstructive pulmonary disease) (HCC)     Dental bridge present     Esophageal reflux      "GERD (gastroesophageal reflux disease)     History of kidney stones     History of radiation therapy     Inguinal hernia     \"had surgery\"    Moderate exercise     has [employer] business and enjoys remodeling properties    Rectal adenocarcinoma (HCC)     per pt not sure of this    Right hip pain     Shortness of breath     \"with activity not if sitting or sleeping\"    Shoulder pain     left--\"feels like cracking\"    Spermatocele     \"had surgery\"     Past Surgical History:   Procedure Laterality Date    CARDIAC CATHETERIZATION N/A 10/22/2024    Procedure: Cardiac RHC;  Surgeon: Edu Proctor MD;  [facility];  Service: Cardiology    CERVICAL FUSION      COLONOSCOPY      CT NEEDLE BIOPSY LUNG  07/09/2019    DENTAL IMPLANT      EGD      FL INJECTION RIGHT HIP (NON ARTHROGRAM)  11/25/2022    HERNIA REPAIR      INSERTION PROSTATE RADIATION SEED      VT ARTHRP ACETBLR/PROX FEM PROSTC AGRFT/ALGRFT Right 4/5/2023    Procedure: ARTHROPLASTY HIP TOTAL ANTERIOR,NAVIGATED;  Surgeon: Akbar Fenton DO;  Location:  MAIN OR;  Service: Orthopedics    VT INSJ MULTI-COMPONENT INFLATABLE PENILE PROSTH N/A 03/08/2016    Procedure: INSERTION OF THREE PIECE PENILE PROSTHESIS;  Surgeon: Davi Reddy MD;  Location: [facility];  Service: Urology    PROSTATE BIOPSY  01/06/2014    ROTATOR CUFF REPAIR Bilateral      Family History   Problem Relation Age of Onset    No Known Problems Mother     No Known Problems Father          Meds/Allergies     Current Outpatient Medications:     albuterol (2.5 mg/3 mL) 0.083 % nebulizer solution, Take 3 mL (2.5 mg total) by nebulization every 6 (six) hours as needed for wheezing or shortness of breath, Disp: 25 mL, Rfl: 3    azithromycin (ZITHROMAX) 500 MG tablet, Take 1 tablet (500 mg total) by mouth every 24 hours for 3 days, Disp: 3 tablet, Rfl: 0    cholecalciferol (VITAMIN D3) 250 MCG (81711 UT) capsule, Take 1 capsule (10,000 Units total) by mouth daily, Disp: 90 capsule, " Rfl: 1    fluticasone-umeclidinium-vilanterol (Trelegy Ellipta) 200-62.5-25 mcg/actuation AEPB inhaler, Inhale 1 puff daily Rinse mouth after use., Disp: 180 blister, Rfl: 1    furosemide (LASIX) 20 mg tablet, Take 1 tablet (20 mg total) by mouth daily, Disp: 90 tablet, Rfl: 3    potassium chloride (Klor-Con M20) 20 mEq tablet, Take 1 tablet (20 mEq total) by mouth daily, Disp: 90 tablet, Rfl: 3    predniSONE 20 mg tablet, Take 2 tablets (40 mg total) by mouth daily, Disp: 10 tablet, Rfl: 0  Allergies   Allergen Reactions    Mobic [Meloxicam] Rash    Penicillins Hives and Other (See Comments)     Other reaction(s): facial swelling  Reaction Unknown       Vitals: Blood pressure 110/60, pulse 79, temperature 98.2 °F (36.8 °C), weight 54.9 kg (121 lb 1.6 oz), SpO2 94%. Body mass index is 18.41 kg/m². Oxygen Therapy  SpO2: 94 %      Physical Exam  Physical Exam  Vitals reviewed.   Constitutional:       General: He is not in acute distress.     Appearance: Normal appearance. He is normal weight.   HENT:      Mouth/Throat:      Mouth: Mucous membranes are moist.   Eyes:      Extraocular Movements: Extraocular movements intact.      Pupils: Pupils are equal, round, and reactive to light.   Cardiovascular:      Rate and Rhythm: Normal rate and regular rhythm.      Pulses: Normal pulses.      Heart sounds: Normal heart sounds.   Pulmonary:      Breath sounds: No wheezing or rhonchi.      Comments: Decreased air movement. Increased work of breathing  Musculoskeletal:         General: Normal range of motion.      Right lower leg: No edema.      Left lower leg: No edema.   Skin:     General: Skin is warm and dry.      Capillary Refill: Capillary refill takes less than 2 seconds.   Neurological:      General: No focal deficit present.      Mental Status: He is alert and oriented to person, place, and time. Mental status is at baseline.   Psychiatric:         Mood and Affect: Mood normal.         Behavior: Behavior normal.  "        Labs: I have personally reviewed pertinent lab results.  Lab Results   Component Value Date    WBC 7.50 10/08/2024    HGB 15.1 10/08/2024    HCT 47.6 10/08/2024     (H) 10/08/2024     10/08/2024     Lab Results   Component Value Date    CALCIUM 9.0 10/08/2024    K 4.5 10/08/2024    CO2 26 10/08/2024     10/08/2024    BUN 18 10/08/2024    CREATININE 0.81 10/08/2024     No results found for: \"IGE\"  Lab Results   Component Value Date    ALT 13 10/08/2024    AST 24 10/08/2024    ALKPHOS 62 10/08/2024         Imaging and other studies: Results Review Statement: I personally reviewed the following image studies in PACS and associated radiology reports: CT chest.    CT chest without contrast 7/8/2024:  Background moderate to severe emphysema. There are multiple new nodular infiltrates primarily peripheral in location, some more reticular and linear while others are more masslike and spiculated. Given the rapid interval development, likely to represent inflammatory/infectious etiology. Previously described nodules remain stable.       Pulmonary function testing:   PFT/SMW 6/28/2024:  Severe obstructive airflow defect   No significant response to the administration of bronchodilator per ATS Standards  Increased lung volumes indicative of air trapping and hyperinflation  Severely decreased diffusion capacity  Flow volume loop is consistent with airflow obstruction  ABG with respiratory alkalosis with low PO2  6MW - No supplemental oxygen required at rest but 2 lpm of supplemental oxygen required with ambulation to maintain SPO2>88%.    EKG, Pathology, and Other Studies: Results Review Statement: I personally reviewed the following image studies in PACS and associated radiology reports: Echocardiogram. My interpretation of the radiology images/reports is:  .    Echocardiogram 8/28/2024:  LVEF 60%, LV cavity size is normal. RV cavity size is mildly dilated. RV systolic function is normal. Right " atrium is dilated. Mild aortic regurgitation, mild tricuspid regurgitation and mild pulmonic regurgitation. The RVSP is 42, though thought to be underestimated due to incomplete definition of the tricuspid regurgitant jet.     RHC 10/22/2024:  RA 15  RV 50/15  PA 50/28, 35  PCWP 23  TPG 12  DPG 5  PA sat 73%  [place] CO/CI: 3.7/2.3  SVR 2054  PVR 3.2  Devan: 1.5    Systemic pressures were moderately elevated.  Moderately elevated biv pressures L>R.  Moderate PH.  Predominantly post capillary PH.  Normal CO/CI.        Aury Shah  Pulmonary & Critical Care Medicine Fellow PGY-4  [facility]

## 2024-11-13 NOTE — TELEPHONE ENCOUNTER
Patient called in stating he has not been feeling well for the past couple of days, he states he feels he has bronchitis. Patient coughing up yellow mucus. Patient requesting to be seen in the office, I have scheduled him for a sick visit today at 1:30 pm with     Thank You

## 2024-11-14 NOTE — ASSESSMENT & PLAN NOTE
Patient was prescribed supplemental oxygen and currently uses 2L with exertion.   Plan:  -Continue 2L on exertion

## 2024-11-14 NOTE — PROGRESS NOTES
Name: Richie Flowers      : 1945      MRN: [id number]  Encounter Provider: Aury Shah MD  Encounter Date: 2024   Encounter department: [facility]  :  Assessment & Plan  Acute bronchitis, unspecified organism  Suspect patient has viral illness that may be worsening his underlying COPD. He reports sinus congestion and yellow-green mucus production every 30 minutes.   Plan:  - Will empirically treat for acute bronchitis with azithromycin for 3 days and prednisone 40mg daily for 5 days.   - Recommend patient trial nebulizer therapy for wheezing and shortness of breath.  Orders:    predniSONE 20 mg tablet; Take 2 tablets (40 mg total) by mouth daily    azithromycin (ZITHROMAX) 500 MG tablet; Take 1 tablet (500 mg total) by mouth every 24 hours for 3 days    albuterol (2.5 mg/3 mL) 0.083 % nebulizer solution; Take 3 mL (2.5 mg total) by nebulization every 6 (six) hours as needed for wheezing or shortness of breath    Nebulizer Supplies    COPD, severe (HCC)  Recent PFT 2024 reveals severe airflow defect with air trapping and hyperinflation and severely decreased DLCO. Patient is being evaluate for BLVR. He is a high risk candidate. He does have pulmonary hypertension which has put this on hold until that is more controlled. He is pending repeat echocardiogram in January. He remains on trelegy daily and reports compliance. He understands that he may never be a candidate for BLVR.   Plan:  -Continue trelegy daily  -Start albuterol nebulizer as needed  Orders:    albuterol (2.5 mg/3 mL) 0.083 % nebulizer solution; Take 3 mL (2.5 mg total) by nebulization every 6 (six) hours as needed for wheezing or shortness of breath    Nebulizer Supplies    Chronic respiratory failure with hypoxia (HCC)  Patient was prescribed supplemental oxygen and currently uses 2L with exertion.   Plan:  -Continue 2L on exertion    Pulmonary hypertension (HCC)  Echo from 2024 with evidence of  elevated RVSP 42. RHC 10/22/2024 with PA pressure 35, PCWP 23. Patient was started on lasix and potassium. He discontinued this due to concern that lasix was precipitating his symptoms  Plan:  -Recommend patient resume lasix and potassium  -Repeat echocardiogram ordered for January  Lung nodules  CT chest 7/8/2024 showed multiple new nodular infiltrates primarily peripheral in location, some more reticular and linear while others are more masslike and spiculated. Previously described nodules remained stable. It was thought that these changes may be due to infectious etiology due to waxing and waning appearance. Plan:  -Continue to monitor nodules with follow up CT scan  Cigarette nicotine dependence in remission  34 pack year history. Quit about 2 years ago  -Continue to encourage smoking cessation      History of Present Illness   HPI: Richie Flowers is a 78 y.o. male with past medical history of COPD, chronic respiratory failure on 2L with exertion and lung nodules, who presents for acute visit for shortness of breath and cough with yellow sputum production.     Patient has a history of severe COPD. He is currently being evaluated for bronchoscopic lung volume reduction. He has evidence of pulmonary hypertension and is pending a repeat echocardiogram in January prior to proceeding with BLVR. He was initiated on lasix and potassium for his pulmonary hypertension. PA pressure must be below 45 mmHg to consider moving forward with valves.    He reports that he feels like his symptoms are worsening and he is getting burnt out. He reports he is coughing up mucus every 30 minutes. Started last Thursday. He has throat pain from coughing and right sided nasal congestion and rhinorrhea. He associated his symptoms with lasix so he stopped that two days ago because he is attributing that to his symptoms. He also reports he tried to stop the trelegy but realized that he got worse without it so he restarted it.     Does not  "use rescue inhaler  He has had improvement with steroids in the past.     He is very concerned that he is getting weaker and will never qualify for bronchoscopic lung volume reduction.     Quit tobacco smoking about 2 years ago  Worked at [employer] x 30 yrs, starting after his time in [employer]    Review of Systems   Constitutional:  Positive for activity change and fatigue.   HENT:  Positive for congestion and rhinorrhea.    Respiratory:  Positive for cough and shortness of breath.    Cardiovascular:  Negative for chest pain, palpitations and leg swelling.   Gastrointestinal:  Negative for abdominal pain.         Pertinent Medical History   .  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    Rectal adenocarcinoma (HCC)     per pt not sure of this    Right hip pain     Shortness of breath     \"with activity not if sitting or sleeping\"    Shoulder pain     left--\"feels like cracking\"    Spermatocele     \"had surgery\"     Past Surgical History:   Procedure Laterality Date    CARDIAC CATHETERIZATION N/A 10/22/2024    Procedure: Cardiac RHC;  Surgeon: Edu Proctor MD;  [facility]: [facility];  Service: Cardiology    CERVICAL FUSION      COLONOSCOPY      CT NEEDLE BIOPSY LUNG  07/09/2019    DENTAL IMPLANT      EGD      FL INJECTION RIGHT HIP (NON ARTHROGRAM)  11/25/2022    HERNIA REPAIR      INSERTION PROSTATE RADIATION SEED      AR ARTHRP ACETBLR/PROX FEM PROSTC AGRFT/ALGRFT Right 4/5/2023    Procedure: " ARTHROPLASTY HIP TOTAL ANTERIOR,NAVIGATED;  Surgeon: Akbar Fenton DO;  Location:  MAIN OR;  Service: Orthopedics    IL INSJ MULTI-COMPONENT INFLATABLE PENILE PROSTH N/A 03/08/2016    Procedure: INSERTION OF THREE PIECE PENILE PROSTHESIS;  Surgeon: Davi Reddy MD;  Location:  MAIN OR;  Service: Urology    PROSTATE BIOPSY  01/06/2014    ROTATOR CUFF REPAIR Bilateral      Family History   Problem Relation Age of Onset    No Known Problems Mother     No Known Problems Father       reports that he quit smoking about 22 months ago. His smoking use included cigarettes. He started smoking about 69 years ago. He has a 34 pack-year smoking history. He has never used smokeless tobacco. He reports current alcohol use of about 14.0 standard drinks of alcohol per week. He reports that he does not use drugs.  Current Outpatient Medications on File Prior to Visit   Medication Sig Dispense Refill    cholecalciferol (VITAMIN D3) 250 MCG (67868 UT) capsule Take 1 capsule (10,000 Units total) by mouth daily 90 capsule 1    fluticasone-umeclidinium-vilanterol (Trelegy Ellipta) 200-62.5-25 mcg/actuation AEPB inhaler Inhale 1 puff daily Rinse mouth after use. 180 blister 1    furosemide (LASIX) 20 mg tablet Take 1 tablet (20 mg total) by mouth daily 90 tablet 3    potassium chloride (Klor-Con M20) 20 mEq tablet Take 1 tablet (20 mEq total) by mouth daily 90 tablet 3    [DISCONTINUED] pantoprazole (PROTONIX) 20 mg tablet Take 1 tablet (20 mg total) by mouth daily before breakfast (Patient not taking: Reported on 11/13/2024) 90 tablet 3     No current facility-administered medications on file prior to visit.     Allergies   Allergen Reactions    Mobic [Meloxicam] Rash    Penicillins Hives and Other (See Comments)     Other reaction(s): facial swelling  Reaction Unknown      Current Outpatient Medications on File Prior to Visit   Medication Sig Dispense Refill    cholecalciferol (VITAMIN D3) 250 MCG (19329 UT) capsule Take 1  capsule (10,000 Units total) by mouth daily 90 capsule 1    fluticasone-umeclidinium-vilanterol (Trelegy Ellipta) 200-62.5-25 mcg/actuation AEPB inhaler Inhale 1 puff daily Rinse mouth after use. 180 blister 1    furosemide (LASIX) 20 mg tablet Take 1 tablet (20 mg total) by mouth daily 90 tablet 3    potassium chloride (Klor-Con M20) 20 mEq tablet Take 1 tablet (20 mEq total) by mouth daily 90 tablet 3    [DISCONTINUED] pantoprazole (PROTONIX) 20 mg tablet Take 1 tablet (20 mg total) by mouth daily before breakfast (Patient not taking: Reported on 2024) 90 tablet 3     No current facility-administered medications on file prior to visit.      Social History     Tobacco Use    Smoking status: Former     Current packs/day: 0.00     Average packs/day: 0.5 packs/day for 68.0 years (34.0 ttl pk-yrs)     Types: Cigarettes     Start date:      Quit date: 2023     Years since quittin.8    Smokeless tobacco: Never   Vaping Use    Vaping status: Never Used   Substance and Sexual Activity    Alcohol use: Yes     Alcohol/week: 14.0 standard drinks of alcohol     Types: 14 Standard drinks or equivalent per week     Comment: 2 michelle meyers and coke per a day    Drug use: No    Sexual activity: Not on file     Comment: defer        Objective   /60   Pulse 79   Temp 98.2 °F (36.8 °C)   Wt 54.9 kg (121 lb 1.6 oz)   SpO2 94%   BMI 18.41 kg/m²        Physical Exam  Physical Exam  Vitals reviewed.   Constitutional:       General: He is not in acute distress.     Appearance: Normal appearance. He is normal weight.   HENT:      Mouth/Throat:      Mouth: Mucous membranes are moist.   Eyes:      Extraocular Movements: Extraocular movements intact.      Pupils: Pupils are equal, round, and reactive to light.   Cardiovascular:      Rate and Rhythm: Normal rate and regular rhythm.      Pulses: Normal pulses.      Heart sounds: Normal heart sounds.   Pulmonary:      Breath sounds: No wheezing or rhonchi.       "Comments: Decreased air movement. Increased work of breathing  Musculoskeletal:         General: Normal range of motion.      Right lower leg: No edema.      Left lower leg: No edema.   Skin:     General: Skin is warm and dry.      Capillary Refill: Capillary refill takes less than 2 seconds.   Neurological:      General: No focal deficit present.      Mental Status: He is alert and oriented to person, place, and time. Mental status is at baseline.   Psychiatric:         Mood and Affect: Mood normal.         Behavior: Behavior normal.         Labs: I have personally reviewed pertinent lab results.  Lab Results   Component Value Date    WBC 7.50 10/08/2024    HGB 15.1 10/08/2024    HCT 47.6 10/08/2024     (H) 10/08/2024     10/08/2024     Lab Results   Component Value Date    CALCIUM 9.0 10/08/2024    K 4.5 10/08/2024    CO2 26 10/08/2024     10/08/2024    BUN 18 10/08/2024    CREATININE 0.81 10/08/2024     No results found for: \"IGE\"  Lab Results   Component Value Date    ALT 13 10/08/2024    AST 24 10/08/2024    ALKPHOS 62 10/08/2024         Imaging and other studies: Results Review Statement: I personally reviewed the following image studies in PACS and associated radiology reports: CT chest.    CT chest without contrast 7/8/2024:  Background moderate to severe emphysema. There are multiple new nodular infiltrates primarily peripheral in location, some more reticular and linear while others are more masslike and spiculated. Given the rapid interval development, likely to represent inflammatory/infectious etiology. Previously described nodules remain stable.       Pulmonary function testing:   PFT/SMW 6/28/2024:  Severe obstructive airflow defect   No significant response to the administration of bronchodilator per ATS Standards  Increased lung volumes indicative of air trapping and hyperinflation  Severely decreased diffusion capacity  Flow volume loop is consistent with airflow " obstruction  ABG with respiratory alkalosis with low PO2  6MW - No supplemental oxygen required at rest but 2 lpm of supplemental oxygen required with ambulation to maintain SPO2>88%.    EKG, Pathology, and Other Studies: Results Review Statement: I personally reviewed the following image studies in PACS and associated radiology reports: Echocardiogram. My interpretation of the radiology images/reports is: .    Echocardiogram 8/28/2024:  LVEF 60%, LV cavity size is normal. RV cavity size is mildly dilated. RV systolic function is normal. Right atrium is dilated. Mild aortic regurgitation, mild tricuspid regurgitation and mild pulmonic regurgitation. The RVSP is 42, though thought to be underestimated due to incomplete definition of the tricuspid regurgitant jet.     RHC 10/22/2024:  RA 15  RV 50/15  PA 50/28, 35  PCWP 23  TPG 12  DPG 5  PA sat 73%  [place] CO/CI: 3.7/2.3  SVR 2054  PVR 3.2  Devan: 1.5    Systemic pressures were moderately elevated.  Moderately elevated biv pressures L>R.  Moderate PH.  Predominantly post capillary PH.  Normal CO/CI.      Aury Shah MD  Pulmonary & Critical Care Medicine Fellow PGY-4  [facility]

## 2024-11-14 NOTE — ASSESSMENT & PLAN NOTE
CT chest 7/8/2024 showed multiple new nodular infiltrates primarily peripheral in location, some more reticular and linear while others are more masslike and spiculated. Previously described nodules remained stable. It was thought that these changes may be due to infectious etiology due to waxing and waning appearance. Plan:  -Continue to monitor nodules with follow up CT scan

## 2024-11-14 NOTE — ASSESSMENT & PLAN NOTE
Suspect patient has viral illness that may be worsening his underlying COPD. He reports sinus congestion and yellow-green mucus production every 30 minutes.   Plan:  - Will empirically treat for acute bronchitis with azithromycin for 3 days and prednisone 40mg daily for 5 days.   - Recommend patient trial nebulizer therapy for wheezing and shortness of breath.  Orders:    predniSONE 20 mg tablet; Take 2 tablets (40 mg total) by mouth daily    azithromycin (ZITHROMAX) 500 MG tablet; Take 1 tablet (500 mg total) by mouth every 24 hours for 3 days    albuterol (2.5 mg/3 mL) 0.083 % nebulizer solution; Take 3 mL (2.5 mg total) by nebulization every 6 (six) hours as needed for wheezing or shortness of breath    Nebulizer Supplies

## 2024-11-14 NOTE — ASSESSMENT & PLAN NOTE
Recent PFT 6/2024 reveals severe airflow defect with air trapping and hyperinflation and severely decreased DLCO. Patient is being evaluate for BLVR. He is a high risk candidate. He does have pulmonary hypertension which has put this on hold until that is more controlled. He is pending repeat echocardiogram in January. He remains on trelegy daily and reports compliance. He understands that he may never be a candidate for BLVR.   Plan:  -Continue trelegy daily  -Start albuterol nebulizer as needed  Orders:    albuterol (2.5 mg/3 mL) 0.083 % nebulizer solution; Take 3 mL (2.5 mg total) by nebulization every 6 (six) hours as needed for wheezing or shortness of breath    Nebulizer Supplies

## 2024-11-18 ENCOUNTER — APPOINTMENT (OUTPATIENT)
Dept: LAB | Facility: IMAGING CENTER | Age: 79
End: 2024-11-18
Payer: COMMERCIAL

## 2024-11-18 ENCOUNTER — OFFICE VISIT (OUTPATIENT)
Dept: INTERNAL MEDICINE CLINIC | Age: 79
End: 2024-11-18
Payer: COMMERCIAL

## 2024-11-18 VITALS
WEIGHT: 122 LBS | TEMPERATURE: 97.3 F | HEART RATE: 69 BPM | HEIGHT: 68 IN | BODY MASS INDEX: 18.49 KG/M2 | DIASTOLIC BLOOD PRESSURE: 70 MMHG | OXYGEN SATURATION: 92 % | SYSTOLIC BLOOD PRESSURE: 110 MMHG

## 2024-11-18 DIAGNOSIS — J44.9 COPD, SEVERE (HCC): ICD-10-CM

## 2024-11-18 DIAGNOSIS — K21.9 GASTROESOPHAGEAL REFLUX DISEASE, UNSPECIFIED WHETHER ESOPHAGITIS PRESENT: ICD-10-CM

## 2024-11-18 DIAGNOSIS — Z23 NEED FOR INFLUENZA VACCINATION: ICD-10-CM

## 2024-11-18 DIAGNOSIS — E44.0 MODERATE PROTEIN-CALORIE MALNUTRITION (HCC): ICD-10-CM

## 2024-11-18 DIAGNOSIS — J96.11 CHRONIC RESPIRATORY FAILURE WITH HYPOXIA (HCC): ICD-10-CM

## 2024-11-18 DIAGNOSIS — Z23 ENCOUNTER FOR IMMUNIZATION: Primary | ICD-10-CM

## 2024-11-18 PROCEDURE — 87070 CULTURE OTHR SPECIMN AEROBIC: CPT

## 2024-11-18 PROCEDURE — 90662 IIV NO PRSV INCREASED AG IM: CPT

## 2024-11-18 PROCEDURE — 87206 SMEAR FLUORESCENT/ACID STAI: CPT

## 2024-11-18 PROCEDURE — 87116 MYCOBACTERIA CULTURE: CPT

## 2024-11-18 PROCEDURE — G0008 ADMIN INFLUENZA VIRUS VAC: HCPCS

## 2024-11-18 PROCEDURE — 87205 SMEAR GRAM STAIN: CPT

## 2024-11-18 PROCEDURE — 87556 M.TUBERCULO DNA AMP PROBE: CPT

## 2024-11-18 PROCEDURE — 99214 OFFICE O/P EST MOD 30 MIN: CPT | Performed by: INTERNAL MEDICINE

## 2024-11-18 NOTE — PROGRESS NOTES
Name: Richie Flowers      : 1945      MRN: [id number]  Encounter Provider: Indu Shepard MD  Encounter Date: 2024   Encounter department: [facility]  :  Assessment & Plan  Encounter for immunization    Orders:    influenza vaccine, high-dose, PF 0.5 mL (Fluzone High Dose)    Need for influenza vaccination    Orders:    influenza vaccine, high-dose, PF 0.5 mL (Fluzone High Dose)    Chronic respiratory failure with hypoxia (HCC)         COPD, severe (HCC)    Orders:    Sputum culture and Gram stain; Future    Direct Respiratory TB PCR ([place] [facility]); Future    Gastroesophageal reflux disease, unspecified whether esophagitis present         Moderate protein-calorie malnutrition (HCC)                History of Present Illness     And is very much frustrated because he was not able to get help for his problem with the severe emphysema he saw the [facility] [place] and also followed up at [facility] but he is very much upset that he is not able to get any help he is using oxygen he did use some steroids nebulizer treatment and also he was diagnosed with the right heart enlargement and pulmonary hypertension he does not wanted to use Lasix and potassium because he thinks that it is not helping giving him a lot of side effects he does not wanted to use he wanted to get some thing which can help with his breathing and unfortunately she was not helped    Pulmonary hypertension secondary to severe COPD    Severe COPD secondary to smoking history but he does not smoke anymore    Gastroesophageal reflux disease is a stable calorie malnutrition is secondary to overall catabolic state      Review of Systems   Constitutional:  Positive for fatigue. Negative for appetite change and fever.   HENT:  Negative for congestion, ear pain, hearing loss, nosebleeds, sneezing, tinnitus and voice change.    Eyes:  Negative for pain, discharge and redness.   Respiratory:   Positive for cough and shortness of breath. Negative for chest tightness and wheezing.    Cardiovascular:  Negative for chest pain, palpitations and leg swelling.   Gastrointestinal:  Negative for abdominal pain, blood in stool, constipation, diarrhea, nausea and vomiting.   Genitourinary:  Negative for difficulty urinating, dysuria, hematuria and urgency.   Musculoskeletal:  Negative for arthralgias, back pain, gait problem and joint swelling.   Skin:  Negative for rash and wound.   Allergic/Immunologic: Negative for environmental allergies.   Neurological:  Negative for dizziness, tremors, seizures, weakness, light-headedness and numbness.   Hematological:  Negative for adenopathy. Does not bruise/bleed easily.   Psychiatric/Behavioral:  Negative for behavioral problems and confusion. The patient is not nervous/anxious.      Medical History Reviewed by provider this encounter:     .  Current Outpatient Medications on File Prior to Visit   Medication Sig Dispense Refill    albuterol (2.5 mg/3 mL) 0.083 % nebulizer solution Take 3 mL (2.5 mg total) by nebulization every 6 (six) hours as needed for wheezing or shortness of breath 25 mL 3    cholecalciferol (VITAMIN D3) 250 MCG (57982 UT) capsule Take 1 capsule (10,000 Units total) by mouth daily 90 capsule 1    fluticasone-umeclidinium-vilanterol (Trelegy Ellipta) 200-62.5-25 mcg/actuation AEPB inhaler Inhale 1 puff daily Rinse mouth after use. 180 blister 1    [DISCONTINUED] furosemide (LASIX) 20 mg tablet Take 1 tablet (20 mg total) by mouth daily 90 tablet 3    [DISCONTINUED] potassium chloride (Klor-Con M20) 20 mEq tablet Take 1 tablet (20 mEq total) by mouth daily 90 tablet 3    [DISCONTINUED] predniSONE 20 mg tablet Take 2 tablets (40 mg total) by mouth daily (Patient not taking: Reported on 11/18/2024) 10 tablet 0     No current facility-administered medications on file prior to visit.         Objective   /70 (BP Location: Left arm, Patient Position:  "Sitting, Cuff Size: Standard)   Pulse 69   Temp (!) 97.3 °F (36.3 °C) (Temporal)   Ht 5' 8\" (1.727 m)   Wt 55.3 kg (122 lb)   SpO2 92%   BMI 18.55 kg/m²      Physical Exam  Vitals and nursing note reviewed.   Constitutional:       Appearance: Normal appearance. He is normal weight.   HENT:      Head: Normocephalic and atraumatic.      Right Ear: Tympanic membrane normal.      Left Ear: Tympanic membrane normal.      Nose: Nose normal.      Mouth/Throat:      Mouth: Mucous membranes are moist.   Eyes:      Extraocular Movements: Extraocular movements intact.      Pupils: Pupils are equal, round, and reactive to light.   Cardiovascular:      Rate and Rhythm: Normal rate and regular rhythm.      Pulses: Normal pulses.      Heart sounds: Normal heart sounds.   Pulmonary:      Effort: Pulmonary effort is normal.      Breath sounds: Decreased air movement present. Decreased breath sounds present.   Abdominal:      General: Abdomen is flat. Bowel sounds are normal.      Palpations: Abdomen is soft.   Musculoskeletal:         General: No swelling, tenderness or deformity. Normal range of motion.      Cervical back: Normal range of motion and neck supple.   Skin:     General: Skin is warm.      Capillary Refill: Capillary refill takes 2 to 3 seconds.   Neurological:      General: No focal deficit present.      Mental Status: He is alert and oriented to person, place, and time. Mental status is at baseline.   Psychiatric:         Mood and Affect: Mood normal.         Behavior: Behavior normal.         "

## 2024-11-18 NOTE — ASSESSMENT & PLAN NOTE
Orders:    Sputum culture and Gram stain; Future    Direct Respiratory TB PCR ([place] [facility]); Future

## 2024-11-20 LAB
BACTERIA SPT RESP CULT: ABNORMAL
GRAM STN SPEC: ABNORMAL
M TB CMPLX DNA ISLT/SPM QL: NOT DETECTED
MTB RPOB RIF RESIST MUT ISLT/SPM QL: NOT DETECTED
RHODAMINE-AURAMINE STN SPEC: NORMAL

## 2024-11-27 LAB
MYCOBACTERIUM SPEC CULT: NORMAL
RHODAMINE-AURAMINE STN SPEC: NORMAL

## 2024-12-05 DIAGNOSIS — A31.0 MYCOBACTERIUM AVIUM INFECTION (HCC): Primary | ICD-10-CM

## 2024-12-05 DIAGNOSIS — R09.3 ABNORMAL SPUTUM: ICD-10-CM

## 2024-12-05 LAB
MYCOBACTERIUM SPEC CULT: ABNORMAL
RHODAMINE-AURAMINE STN SPEC: ABNORMAL

## 2024-12-09 ENCOUNTER — TELEPHONE (OUTPATIENT)
Age: 79
End: 2024-12-09

## 2024-12-09 NOTE — TELEPHONE ENCOUNTER
Patient called. Pt states spoke with Dr. Shepard regarding recent lab results was referred to provider to follow up. Pt states contacted office to scheduled appt and they do not treat COPD. Requesting a call from Dr. Shepard.        Please review and advise.  Thank you

## 2024-12-10 ENCOUNTER — TELEPHONE (OUTPATIENT)
Age: 79
End: 2024-12-10

## 2024-12-10 LAB — SCAN RESULT: NORMAL

## 2024-12-10 NOTE — TELEPHONE ENCOUNTER
Have cultures been ordered and interpreted? yes    If Yes, schedule next available ID appointment.     If No, send message back to referring provider; need confirmed diagnosis (pathogen)    (Will not see without confirmed sputum or x-ray)         Pt's reports are current still in preliminary will call to schedule consultation upon finalized reports

## 2024-12-11 NOTE — TELEPHONE ENCOUNTER
Spoke with pt regarding referral. He states he is currently at [facility] seeing an infectious disease doctor for the [facility] referral that he does not need to follow up with ID.

## 2024-12-13 DIAGNOSIS — R91.8 LUNG NODULES: Primary | ICD-10-CM

## 2024-12-16 ENCOUNTER — APPOINTMENT (OUTPATIENT)
Dept: LAB | Facility: IMAGING CENTER | Age: 79
End: 2024-12-16
Payer: COMMERCIAL

## 2024-12-16 ENCOUNTER — OFFICE VISIT (OUTPATIENT)
Dept: INTERNAL MEDICINE CLINIC | Age: 79
End: 2024-12-16

## 2024-12-16 VITALS
SYSTOLIC BLOOD PRESSURE: 116 MMHG | DIASTOLIC BLOOD PRESSURE: 74 MMHG | WEIGHT: 117 LBS | HEIGHT: 68 IN | OXYGEN SATURATION: 94 % | BODY MASS INDEX: 17.73 KG/M2 | TEMPERATURE: 97.4 F | HEART RATE: 76 BPM

## 2024-12-16 DIAGNOSIS — Z87.891 FORMER SMOKER: ICD-10-CM

## 2024-12-16 DIAGNOSIS — J44.9 COPD, SEVERE (HCC): Primary | ICD-10-CM

## 2024-12-16 DIAGNOSIS — J96.11 CHRONIC RESPIRATORY FAILURE WITH HYPOXIA (HCC): ICD-10-CM

## 2024-12-16 DIAGNOSIS — I27.23 PULMONARY HYPERTENSION DUE TO COPD (HCC): ICD-10-CM

## 2024-12-16 DIAGNOSIS — R93.89 ABNORMAL CAT SCAN: ICD-10-CM

## 2024-12-16 DIAGNOSIS — J44.9 PULMONARY HYPERTENSION DUE TO COPD (HCC): ICD-10-CM

## 2024-12-16 DIAGNOSIS — R93.89 ABNORMAL RADIOLOGICAL FINDINGS IN SKIN AND SUBCUTANEOUS TISSUE: ICD-10-CM

## 2024-12-16 DIAGNOSIS — A15.0: ICD-10-CM

## 2024-12-16 PROCEDURE — 87184 SC STD DISK METHOD PER PLATE: CPT

## 2024-12-16 PROCEDURE — 87070 CULTURE OTHR SPECIMN AEROBIC: CPT

## 2024-12-16 PROCEDURE — 87185 SC STD ENZYME DETCJ PER NZM: CPT

## 2024-12-16 PROCEDURE — 87077 CULTURE AEROBIC IDENTIFY: CPT

## 2024-12-16 PROCEDURE — 87205 SMEAR GRAM STAIN: CPT

## 2024-12-16 RX ORDER — ARFORMOTEROL TARTRATE 15 UG/2ML
15 SOLUTION RESPIRATORY (INHALATION) 2 TIMES DAILY
COMMUNITY
Start: 2024-12-11

## 2024-12-16 RX ORDER — POTASSIUM CHLORIDE 1500 MG/1
20 TABLET, EXTENDED RELEASE ORAL DAILY
COMMUNITY
Start: 2024-12-11

## 2024-12-16 RX ORDER — FORMOTEROL FUMARATE DIHYDRATE 20 UG/2ML
20 SOLUTION RESPIRATORY (INHALATION) 2 TIMES DAILY
COMMUNITY
Start: 2024-12-11

## 2024-12-16 RX ORDER — FUROSEMIDE 20 MG/1
20 TABLET ORAL DAILY
COMMUNITY
Start: 2024-12-11

## 2024-12-16 RX ORDER — BUDESONIDE 0.5 MG/2ML
0.5 INHALANT ORAL 2 TIMES DAILY
COMMUNITY
Start: 2024-12-11

## 2024-12-16 RX ORDER — AZITHROMYCIN 250 MG/1
TABLET, FILM COATED ORAL
COMMUNITY
Start: 2024-12-11 | End: 2024-12-16 | Stop reason: ALTCHOICE

## 2024-12-16 NOTE — ASSESSMENT & PLAN NOTE
Echo 8/2024 elevated RVSP 42.  RHC 10/22/2024 PA pressure 35, PCWP 23  Not using Lasix and potassium 2/2 frequent urination.

## 2024-12-16 NOTE — ASSESSMENT & PLAN NOTE
Sputum culture positive for AFB on 11/18/2024  Will need to repeat 2 sputum culture for AFB which has been orderd\  If positive, will need treatment for the same

## 2024-12-16 NOTE — ASSESSMENT & PLAN NOTE
Uses as needed oxygen   Started following at [facility] [address]  As per last note from [facility] Pulmonology: Yupelri (revefenacin) 1 vial in the morning (DO NOT MIX WITH ANYTHING ELSE)  Brovana (arformoterol) 1 vial every 12 hours, must be refrigerated. Can be mixed with budesonide   Pulmicort (budesonide) 1 vial every 12 hours, can be mixed with brovana   Albuterol nebulizer or inhaler, can be used up to every 4 hours as needed for shortness of breath     Patient was being evaluated for BL EV but current.y being seen at [facility] [place] [facility]  -Recently treated for acute bronchitis 11/13

## 2024-12-16 NOTE — PROGRESS NOTES
Name: Richie Flowers      : 1945      MRN: [id number]  Encounter Provider: Shellie Mcgraw MD  Encounter Date: 2024   Encounter department: [facility]    Assessment & Plan  COPD, severe (HCC)  Uses as needed oxygen   Started following at [facility] [address]  As per last note from [facility] Pulmonology: Yupelri (revefenacin) 1 vial in the morning (DO NOT MIX WITH ANYTHING ELSE)  Brovana (arformoterol) 1 vial every 12 hours, must be refrigerated. Can be mixed with budesonide   Pulmicort (budesonide) 1 vial every 12 hours, can be mixed with brovana   Albuterol nebulizer or inhaler, can be used up to every 4 hours as needed for shortness of breath     Patient was being evaluated for BL EV but [place] [facility]  -Recently treated for acute bronchitis        Chronic respiratory failure with hypoxia (HCC)  As needed oxygen        Former smoker  Quit tobacco smoking about 2 years ago        Pulmonary hypertension due to COPD (HCC)  Echo 2024 elevated RVSP 42.  RHC 10/22/2024 PA pressure 35, PCWP 23  Not using Lasix and potassium 2/2 frequent urination.         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 Patient is frustrated because his breathing has not improved even after multiple regimens.  Patient started following with you plan recently and has been prescribed new regimen.  He has not started using it yet and states that he will  from the pharmacy today.  No complaint of recent sore throat, fever or  "chills, chest pain, leg swelling.    Of note, patient was recently positive for AFB in his sputum.  Dr. Shepard had a discussion with ID, as per their recommendations we will be repeating 2 AFB culture and sputum for now and further management after the results are back.  Patient has no fevers, nausea vomiting, worsening of cough.      Review of Systems   Constitutional:  Positive for activity change, appetite change and fatigue. Negative for chills and fever.   HENT:  Negative for ear discharge, ear pain and sore throat.    Respiratory:  Positive for cough and shortness of breath. Negative for apnea, choking, chest tightness, wheezing and stridor.    Cardiovascular:  Negative for chest pain, palpitations and leg swelling.   Gastrointestinal:  Negative for abdominal pain.   Genitourinary:  Negative for dysuria and urgency.   Neurological:  Negative for headaches.     Past Medical History:   Diagnosis Date    Arthritis     Smith's esophagus     Chronic cough     per pt since quit smoking less coughing-slightly yellow    Chronic pain disorder     hip    Colon polyp     COPD (chronic obstructive pulmonary disease) (HCC)     Dental bridge present     Esophageal reflux     GERD (gastroesophageal reflux disease)     History of kidney stones     History of radiation therapy     Inguinal hernia     \"had surgery\"    Moderate exercise     has landscape business and enjoys remodeling properties    Rectal adenocarcinoma (HCC)     per pt not sure of this    Right hip pain     Shortness of breath     \"with activity not if sitting or sleeping\"    Shoulder pain     left--\"feels like cracking\"    Spermatocele     \"had surgery\"     Past Surgical History:   Procedure Laterality Date    CARDIAC CATHETERIZATION N/A 10/22/2024    Procedure: Cardiac RHC;  Surgeon: Edu Proctor MD;  Location: [facility];  Service: Cardiology    CERVICAL FUSION      COLONOSCOPY      CT NEEDLE BIOPSY LUNG  07/09/2019    DENTAL IMPLANT      EGD      " FL INJECTION RIGHT HIP (NON ARTHROGRAM)  2022    HERNIA REPAIR      INSERTION PROSTATE RADIATION SEED      MS ARTHRP ACETBLR/PROX FEM PROSTC AGRFT/ALGRFT Right 2023    Procedure: ARTHROPLASTY HIP TOTAL ANTERIOR,NAVIGATED;  Surgeon: Akbar Fenton DO;  Location:  MAIN OR;  Service: Orthopedics    MS INSJ MULTI-COMPONENT INFLATABLE PENILE PROSTH N/A 2016    Procedure: INSERTION OF THREE PIECE PENILE PROSTHESIS;  Surgeon: Davi Reddy MD;  Location: [facility]IN [facility];  Service: Urology    PROSTATE BIOPSY  2014    ROTATOR CUFF REPAIR Bilateral      Family History   Problem Relation Age of Onset    No Known Problems Mother     No Known Problems Father      Social History     Tobacco Use    Smoking status: Former     Current packs/day: 0.00     Average packs/day: 0.5 packs/day for 68.0 years (34.0 ttl pk-yrs)     Types: Cigarettes     Start date:      Quit date: 2023     Years since quittin.9    Smokeless tobacco: Never   Vaping Use    Vaping status: Never Used   Substance and Sexual Activity    Alcohol use: Yes     Alcohol/week: 14.0 standard drinks of alcohol     Types: 14 Standard drinks or equivalent per week     Comment: 2 jack meyers and coke per a day    Drug use: No    Sexual activity: Not on file     Comment: defer     Current Outpatient Medications on File Prior to Visit   Medication Sig    albuterol (2.5 mg/3 mL) 0.083 % nebulizer solution Take 3 mL (2.5 mg total) by nebulization every 6 (six) hours as needed for wheezing or shortness of breath    cholecalciferol (VITAMIN D3) 250 MCG (78130 UT) capsule Take 1 capsule (10,000 Units total) by mouth daily    fluticasone-umeclidinium-vilanterol (Trelegy Ellipta) 200-62.5-25 mcg/actuation AEPB inhaler Inhale 1 puff daily Rinse mouth after use.    arformoterol (BROVANA) 15 mcg/2 mL nebulizer solution Inhale 15 mcg 2 (two) times a day (Patient not taking: Reported on 2024)    azithromycin (ZITHROMAX) 250 mg tablet   "(Patient not taking: Reported on 12/16/2024)    budesonide (PULMICORT) 0.5 mg/2 mL nebulizer solution Inhale 0.5 mg 2 (two) times a day (Patient not taking: Reported on 12/16/2024)    formoterol (PERFOROMIST) 20 MCG/2ML nebulizer solution Inhale 20 mcg 2 (two) times a day (Patient not taking: Reported on 12/16/2024)    furosemide (LASIX) 20 mg tablet Take 20 mg by mouth daily (Patient not taking: Reported on 12/16/2024)    Potassium Chloride ER 20 MEQ TBCR Take 20 mEq by mouth daily (Patient not taking: Reported on 12/16/2024)    Revefenacin 175 MCG/3ML SOLN Take 175 mcg by nebulization daily in the early morning     Allergies   Allergen Reactions    Mobic [Meloxicam] Rash    Penicillins Hives and Other (See Comments)     Other reaction(s): facial swelling  Reaction Unknown     Immunization History   Administered Date(s) Administered    COVID-19 MODERNA VACC 0.5 ML IM 10/27/2021    INFLUENZA 11/15/2022    Influenza Split High Dose Preservative Free IM 11/18/2024    Influenza, high dose seasonal 0.7 mL 11/15/2022, 01/23/2024    Influenza, seasonal, injectable 10/10/2011    Pneumococcal Conjugate 13-Valent 05/30/2017    Pneumococcal Polysaccharide PPV23 10/10/2011    Td (adult), adsorbed 04/01/2008     Objective   /74 (BP Location: Left arm, Patient Position: Sitting, Cuff Size: Standard)   Pulse 76   Temp (!) 97.4 °F (36.3 °C) (Temporal)   Ht 5' 8\" (1.727 m)   Wt 53.1 kg (117 lb)   SpO2 94%   BMI 17.79 kg/m²     Physical Exam  Vitals reviewed.   Constitutional:       Comments: Frail   HENT:      Mouth/Throat:      Mouth: Mucous membranes are moist.   Cardiovascular:      Rate and Rhythm: Normal rate and regular rhythm.      Pulses: Normal pulses.      Heart sounds: Normal heart sounds.   Pulmonary:      Effort: Pulmonary effort is normal.      Comments: Decreased breath sounds lower lobes  Abdominal:      Palpations: Abdomen is soft.   Musculoskeletal:      Cervical back: Normal range of motion.      " Right lower leg: No edema.      Left lower leg: No edema.   Skin:     General: Skin is warm.   Neurological:      Mental Status: He is alert and oriented to person, place, and time.

## 2024-12-20 ENCOUNTER — RESULTS FOLLOW-UP (OUTPATIENT)
Dept: INTERNAL MEDICINE CLINIC | Age: 79
End: 2024-12-20

## 2024-12-20 DIAGNOSIS — J40 BRONCHITIS: Primary | ICD-10-CM

## 2024-12-20 RX ORDER — CEFDINIR 300 MG/1
300 CAPSULE ORAL EVERY 12 HOURS SCHEDULED
Qty: 16 CAPSULE | Refills: 0 | Status: SHIPPED | OUTPATIENT
Start: 2024-12-20 | End: 2024-12-28

## 2024-12-21 LAB
BACTERIA SPT RESP CULT: ABNORMAL
BACTERIA SPT RESP CULT: ABNORMAL
GRAM STN SPEC: ABNORMAL

## 2025-01-02 NOTE — PROGRESS NOTES
"Advanced Heart Failure/Pulmonary Hypertension Outpatient Note - Richie Flowers 79 y.o. male MRN: [id number]    @ Encounter: [id number]      Assessment:  79 y.o. male PMH and acute problems listed later in this note (a partial list may also be included within 'assessment' section) presents for consultation.  I first met Richie Flowers on 9/11/24.  Referred by No ref. provider found.     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   Quit tobacco smoking about 2 years ago  Heavy drinking many years ago  Worked at [employer] x 30 yrs, starting after his time in [employer]      Today I have reviewed all pertinent labs/imaging/data including but not limited to:        Lab Units 10/08/24  0849 08/09/24  0949 07/12/23  1049   CREATININE mg/dL 0.81 0.83 0.82         Lab Results   Component Value Date    K 4.5 10/08/2024     Lab Results   Component Value Date    HGBA1C 5.4 03/11/2023     Lab Results   Component Value Date    ZGJ6HOMQDROP 4.060 (H) 02/05/2021     Lab Results   Component Value Date    LDLCALC 89 08/09/2024     No results found for: \"BNP\"   No results found for: \"NTBNP\"       TODAY'S PLAN:     01/03/25  Warm, euvolemic  No new cardiac complaints  He says he feels \"disgusted\" by the ongoing 2 year process of dealing with his lung dz, beginning with complaints about his initial [place] team years ago, he dislikes how slow " "process seems to him and his progressive symptoms.  About 1 month ago he stopped all Rx for 1 week, he has since resumed all per the pt, including lasix which he takes about 5 of 7 days a week.    He plans to cw his Rx for now    Has repeat HUP evaluation 1/21/25 and repeat echo 1/23/25  Further decision on intervention pending these studies    We discussed importance of using all his Rx as prescribed    No Rx changes today    Follow up:  With me as needed basis.  In addition to follow up with their other medical providers    Key info from my prior notes:    I am meeting patient for the first time today  Warm, euvolemic  No new cardiac complaints, +ongoing fatigue and SOB. He wants to \"get out of low gear\"    Under evaluation for BLVR     Reviewed echo and all pertinent data  Mild RV dysfxn and mild-mod dilation.  Inconclusive PH signal by echo  We reviewed risksk vs benefits of RHC now - he is amenable to proceeding    No Rx changes today    Advised exercise as able    Studies:  Today I have reviewed all pertinent patient data/labs/imaging where available, including but not limited to the below studies. This includes my independent interpretation. Selected results may be displayed here but comprehensive listing is omitted for note clarity and can be found in the epic chart.    10/2024 RHC:  All measurements taken at end expiration.  There was significant respiratory variation.     The patient was not on inotropes or MCS at the time measurements were taken.  Not on O2 during case.     /90, 110  HR 70  O2 sat 97%  Hgb 15.1     RA 15  RV 50/15  PA 50/28, 35  PCWP 23  TPG 12  DPG 5  PA sat 73%  [place] CO/CI: 3.7/2.3  SVR 2054  PVR 3.2  Devan: 1.5      Impression/Recommendations:  The patient tolerated the procedure well; no immediate complications.  Systemic pressures were moderately elevated.  Moderately elevated biv pressures L>R.  Moderate PH.  Predominantly post capillary PH.  Normal CO/CI.     Add diuretic and " "potassium today.  Monitor BP further as outpt in case need for pharmacotherapy.  Ongoing PH workup and treatment as outpt.    ECG.    Echo.    Stress.    Cath.    HPI:   79 y.o. male PMH and acute problems listed later in this note (a partial list may also be included within 'assessment' section) presents for consultation.  No new CP/dizziness/palpitations/syncope.  No new unintentional weight changes.  No new leg swelling, PND, pillow orthopnea.  No new fevers, chills, cough, nausea, vomiting, diarrhea, dysuria.    Interval History:  As noted in 'plan' section above and prior epic chart notes.    No new CP/SOB/dizziness/palpitations/syncope.  No new fatigue.  No new unintentional weight changes.  No new leg swelling, PND, pillow orthopnea.  No new fevers, chills, cough, nausea, vomiting, diarrhea, dysuria.    ROS:  10 point ROS negative except as specified in HPI    Past Medical History:   Diagnosis Date    Arthritis     Smith's esophagus     Chronic cough     per pt since quit smoking less coughing-slightly yellow    Chronic pain disorder     hip    Colon polyp     COPD (chronic obstructive pulmonary disease) (HCC)     Dental bridge present     Esophageal reflux     GERD (gastroesophageal reflux disease)     History of kidney stones     History of radiation therapy     Inguinal hernia     \"had surgery\"    Moderate exercise     has [employer] business and enjoys remodeling properties    Rectal adenocarcinoma (HCC)     per pt not sure of this    Right hip pain     Shortness of breath     \"with activity not if sitting or sleeping\"    Shoulder pain     left--\"feels like cracking\"    Spermatocele     \"had surgery\"     Patient Active Problem List   Diagnosis    Smith esophagus    COPD, severe (HCC)    Erectile dysfunction    Esophageal reflux    Lung nodules    Hiatal hernia    Elevated prostate specific antigen (PSA)    Osteoarthritis of one hip, right    Moderate protein-calorie malnutrition (HCC)    Former smoker "    Primary osteoarthritis of one hip, right    Chronic respiratory failure with hypoxia (HCC)    Acute bronchitis, unspecified organism    Pulmonary hypertension due to COPD (HCC)    Tuberculous pneumonia (any form), 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      Allergies   Allergen Reactions    Mobic [Meloxicam] Rash    Penicillins Hives and Other (See Comments)     Other reaction(s): facial swelling  Reaction Unknown     Social History     Socioeconomic History    Marital status: Single     Spouse name: Not on file    Number of children: Not on file    Years of education: Not on file    Highest education level: Not on file   Occupational History    Not on file   Tobacco Use    Smoking status: Former     Current packs/day: 0.00     Average packs/day: 0.5 packs/day for 68.0 years (34.0 ttl pk-yrs)     Types: Cigarettes     Start date:      Quit date: 2023     Years since quittin.9    Smokeless tobacco: Never   Vaping Use    Vaping status: Never Used   Substance and Sexual Activity    Alcohol use: Yes     Alcohol/week: 14.0 standard drinks of alcohol     Types: 14 Standard drinks or equivalent per week     Comment: 2 michelle mira and coomayra per a day    Drug use: No    Sexual activity: Not on file     Comment: defer   Other Topics Concern    Not on file   Social History Narrative    Not on file     Social Drivers of Health     Financial Resource Strain: Low Risk  (2024)    Overall Financial Resource Strain (CARDIA)     Difficulty of Paying Living Expenses: Not hard at all   Food Insecurity: No Food Insecurity (2023)    Hunger Vital Sign     Worried About Running Out of Food in the Last Year: Never true     Ran Out of Food in the Last Year: Never true   Transportation Needs: No Transportation Needs (2024)    PRAPARE - Transportation     Lack of Transportation (Medical): No     Lack of Transportation (Non-Medical): No  "  Physical Activity: Not on file   Stress: Not on file   Social Connections: Not on file   Intimate Partner Violence: Not on file   Housing Stability: Low Risk  (4/6/2023)    Housing Stability Vital Sign     Unable to Pay for Housing in the Last Year: No     Number of Places Lived in the Last Year: 1     Unstable Housing in the Last Year: No     Family History   Problem Relation Age of Onset    No Known Problems Mother     No Known Problems Father        Physical Exam:  Vitals:    01/03/25 1039   BP: 118/70   BP Location: Left arm   Patient Position: Sitting   Cuff Size: Standard   Pulse: 60   SpO2: 94%   Weight: 54.5 kg (120 lb 3.2 oz)   Height: 5' 8\" (1.727 m)       Constitutional: NAD, non toxic, thin  Ears/nose/mouth/throat: atraumatic  CV: RRR, no JVD, no HJR  Resp: decreased sounds BL  GI: Soft, NTND  MSK: no swollen joints in exposed areas  Extr: No edema, warm LE  Pysche: Normal affect  Neuro: appropriate in conversation  Skin: dry and intact in exposed areas    Labs & Results:  Lab Results   Component Value Date    WBC 7.50 10/08/2024    HGB 15.1 10/08/2024    HCT 47.6 10/08/2024     (H) 10/08/2024     10/08/2024     Lab Results   Component Value Date    SODIUM 137 10/08/2024    K 4.5 10/08/2024     10/08/2024    CO2 26 10/08/2024    BUN 18 10/08/2024    CREATININE 0.81 10/08/2024    GLUC 115 04/09/2023    CALCIUM 9.0 10/08/2024       Counseling / Coordination of Care  Greater than 50% of total time was spent with the patient and / or family counseling and / or coordination of care. Discussion included diagnoses, most recent studies and any changes in treatment.    Thank you for the opportunity to participate in the care of this patient.    Edu Proctor MD  Attending Physician  Advanced Heart Failure and Transplant Cardiology  [facility]  "

## 2025-01-03 ENCOUNTER — OFFICE VISIT (OUTPATIENT)
Dept: CARDIOLOGY CLINIC | Facility: CLINIC | Age: 80
End: 2025-01-03
Payer: COMMERCIAL

## 2025-01-03 VITALS
SYSTOLIC BLOOD PRESSURE: 118 MMHG | DIASTOLIC BLOOD PRESSURE: 70 MMHG | WEIGHT: 120.2 LBS | HEART RATE: 60 BPM | OXYGEN SATURATION: 94 % | HEIGHT: 68 IN | BODY MASS INDEX: 18.22 KG/M2

## 2025-01-03 DIAGNOSIS — I27.20 PULMONARY HYPERTENSION (HCC): Primary | ICD-10-CM

## 2025-01-03 DIAGNOSIS — R54 FRAILTY: ICD-10-CM

## 2025-01-03 DIAGNOSIS — J44.9 COPD, SEVERE (HCC): ICD-10-CM

## 2025-01-03 PROCEDURE — 99214 OFFICE O/P EST MOD 30 MIN: CPT | Performed by: STUDENT IN AN ORGANIZED HEALTH CARE EDUCATION/TRAINING PROGRAM

## 2025-01-06 LAB
MYCOBACTERIUM SPEC CULT: ABNORMAL
MYCOBACTERIUM SPEC CULT: ABNORMAL
RHODAMINE-AURAMINE STN SPEC: ABNORMAL
SCAN RESULT: NORMAL

## 2025-01-13 DIAGNOSIS — J44.9 COPD, SEVERE (HCC): ICD-10-CM

## 2025-01-13 RX ORDER — FLUTICASONE FUROATE, UMECLIDINIUM BROMIDE AND VILANTEROL TRIFENATATE 200; 62.5; 25 UG/1; UG/1; UG/1
1 POWDER RESPIRATORY (INHALATION) DAILY
Qty: 180 BLISTER | Refills: 1 | Status: SHIPPED | OUTPATIENT
Start: 2025-01-13 | End: 2025-07-12

## 2025-01-13 NOTE — TELEPHONE ENCOUNTER
Reason for call:   [x] Refill   [] Prior Auth  [] Other:     Office:   [] PCP/Provider -   [x] Specialty/Provider - Raya Mccallum    Medication:   Trelegy ellipta 200-62.5-25 mcg, 180    Pharmacy:   [facility]      Does the patient have enough for 3 days?   [] Yes   [x] No - Send as HP to POD

## 2025-01-14 LAB
MYCOBACTERIUM SPEC CULT: ABNORMAL
MYCOBACTERIUM SPEC CULT: ABNORMAL
RHODAMINE-AURAMINE STN SPEC: ABNORMAL

## 2025-01-15 PROBLEM — A15.0: Status: RESOLVED | Noted: 2024-12-16 | Resolved: 2025-01-15

## 2025-01-23 ENCOUNTER — HOSPITAL ENCOUNTER (OUTPATIENT)
Dept: NON INVASIVE DIAGNOSTICS | Facility: HOSPITAL | Age: 80
Discharge: HOME | End: 2025-01-23
Attending: INTERNAL MEDICINE
Payer: COMMERCIAL

## 2025-01-23 VITALS
HEART RATE: 60 BPM | HEIGHT: 68 IN | WEIGHT: 120 LBS | DIASTOLIC BLOOD PRESSURE: 70 MMHG | BODY MASS INDEX: 18.19 KG/M2 | SYSTOLIC BLOOD PRESSURE: 118 MMHG

## 2025-01-23 DIAGNOSIS — I27.20 PULMONARY HYPERTENSION (HCC): ICD-10-CM

## 2025-01-23 LAB
AORTIC ROOT: 3.8 CM
AV REGURGITATION PRESSURE HALF TIME: 905 MS
BSA FOR ECHO PROCEDURE: 1.65 M2
INTERVENTRICULAR SEPTUM IN DIASTOLE (PARASTERNAL SHORT AXIS VIEW): 1 CM
IVC: 23 MM
LEFT VENTRICULAR INTERNAL DIMENSION IN DIASTOLE: 3.8 CM (ref 3.5–6)
LEFT VENTRICULAR POSTERIOR WALL IN END DIASTOLE: 1 CM
LV EF US.2D.A4C+ESTIMATED: 53 %
RA PRESSURE ESTIMATED: 15 MMHG
RIGHT VENTRICLE ID DIMENSION: 5 CM
RV PSP: 59 MMHG
SL CV AV DECELERATION TIME RETROGRADE: 3120 MS
SL CV AV PEAK GRADIENT RETROGRADE: 42 MMHG
SL CV LV EF: 55
TR MAX PG: 44 MMHG
TR PEAK VELOCITY: 3.3 M/S
TRICUSPID VALVE PEAK REGURGITATION VELOCITY: 3.33 M/S

## 2025-01-23 PROCEDURE — 93308 TTE F-UP OR LMTD: CPT

## 2025-01-23 PROCEDURE — 93325 DOPPLER ECHO COLOR FLOW MAPG: CPT

## 2025-01-23 PROCEDURE — 93308 TTE F-UP OR LMTD: CPT | Performed by: INTERNAL MEDICINE

## 2025-01-23 PROCEDURE — 93325 DOPPLER ECHO COLOR FLOW MAPG: CPT | Performed by: INTERNAL MEDICINE

## 2025-01-23 PROCEDURE — 93321 DOPPLER ECHO F-UP/LMTD STD: CPT

## 2025-01-23 PROCEDURE — 93321 DOPPLER ECHO F-UP/LMTD STD: CPT | Performed by: INTERNAL MEDICINE

## 2025-01-24 ENCOUNTER — RESULTS FOLLOW-UP (OUTPATIENT)
Age: 80
End: 2025-01-24

## 2025-01-31 ENCOUNTER — OFFICE VISIT (OUTPATIENT)
Dept: PULMONOLOGY | Facility: CLINIC | Age: 80
End: 2025-01-31
Payer: COMMERCIAL

## 2025-01-31 VITALS
TEMPERATURE: 96.8 F | BODY MASS INDEX: 17.26 KG/M2 | HEART RATE: 66 BPM | OXYGEN SATURATION: 96 % | WEIGHT: 113.9 LBS | SYSTOLIC BLOOD PRESSURE: 118 MMHG | DIASTOLIC BLOOD PRESSURE: 64 MMHG | HEIGHT: 68 IN

## 2025-01-31 DIAGNOSIS — J96.11 CHRONIC RESPIRATORY FAILURE WITH HYPOXIA (HCC): Primary | ICD-10-CM

## 2025-01-31 DIAGNOSIS — J44.9 COPD, SEVERE (HCC): ICD-10-CM

## 2025-01-31 DIAGNOSIS — R91.8 LUNG NODULES: ICD-10-CM

## 2025-01-31 DIAGNOSIS — I27.23 PULMONARY HYPERTENSION DUE TO COPD (HCC): ICD-10-CM

## 2025-01-31 DIAGNOSIS — J44.9 PULMONARY HYPERTENSION DUE TO COPD (HCC): ICD-10-CM

## 2025-01-31 PROCEDURE — 99215 OFFICE O/P EST HI 40 MIN: CPT | Performed by: INTERNAL MEDICINE

## 2025-01-31 RX ORDER — POTASSIUM CHLORIDE 1500 MG/1
TABLET, EXTENDED RELEASE ORAL
COMMUNITY
Start: 2024-12-16

## 2025-01-31 NOTE — ASSESSMENT & PLAN NOTE
The patient would very much like to pursue BLVR.  He has completed all necessary testing.  Characteristics of his emphysema, perfusion pattern and PFTs, make him a candidate likely to respond.  Primary target would be left lower lobe, followed by right lower lobe.  My hesitation is related to underlying pulmonary hypertension.  I have discussed the case at length with Dr. Null at [facility].  He feels that the PA pressures are not a strict contraindication, however would warrant clear understanding from the patient that he is at greater risk of having complications.  We would need to take extra caution with his hemodynamics if we would move forward.     I also discussed at length with my team here at [facility].  Although the procedure itself would not be technically challenging, the overall sense is that the patient should undergo the procedure at [facility] where they would have additional expertise that could handle cardiovascular compromise should any issues arise.

## 2025-01-31 NOTE — PROGRESS NOTES
Progress Note - Pulmonary Medicine     Name: Richie Flowers      : 1945      MRN: [id number]  Encounter Provider: Ashlie Townsend DO  Encounter Date: 2025   Encounter department: [facility]    :  Assessment & Plan  COPD, severe (HCC)  The patient would very much like to pursue BLVR.  He has completed all necessary testing.  Characteristics of his emphysema, perfusion pattern and PFTs, make him a candidate likely to respond.  Primary target would be left lower lobe, followed by right lower lobe.  My hesitation is related to underlying pulmonary hypertension.  I have discussed the case at length with Dr. Null at [facility].  He feels that the PA pressures are not a strict contraindication, however would warrant clear understanding from the patient that he is at greater risk of having complications.  We would need to take extra caution with his hemodynamics if we would move forward.     I also discussed at length with my team here at [facility].  Although the procedure itself would not be technically challenging, the overall sense is that the patient should undergo the procedure at [facility] where they would have additional expertise that could handle cardiovascular compromise should any issues arise.     Pulmonary hypertension due to COPD (HCC)  Echocardiogram done earlier this week shows RV dilation with preserved systolic function but no significant improvement in PA pressures since being on diuretic therapy.  He does not examine volume overloaded.  Dr. Proctor does not feel that additional therapy would be warranted or beneficial given this is largely related to underlying severe lung disease.    Chronic respiratory failure with hypoxia (HCC)  Patient requires oxygen at 2 L/min with exertion.  Room air saturations at rest are adequate    Lung nodules  Patient has scattered nodular opacities, likely on the basis of chronic MAC  "infection.  Plan to repeat CT to document stability/improvement  Orders:    CT chest without contrast; Future          History of Present Illness   Richie Flowers is a 79 y.o. male who presents for follow-up regarding severe COPD and chronic hypoxic respiratory failure, hoping to undergo bronchoscopic lung volume reduction.  Since his last visit with me, he underwent right heart catheterization which confirmed moderate pulmonary hypertension.  He was started on Lasix which he did not feel changed his exercise capacity in any way and he is frustrated at the frequency of urination.  He has no lower extremity edema.  He is struggling to keep his weight up.  Has a chronic cough and typically expectorates sputum 3-4 times per day.  No significant wheezing.  He uses Trelegy Ellipta once daily and does not find nebulizers to be beneficial.  He denies fevers chills or night sweats.    Review of Systems:  Review of Systems  Aside from what is mentioned in the HPI, ROS is otherwise negative         Medications:  Medication list reviewed and updated as appropriate    PMH, Social history, family history and tobacco history  Reviewed and updated as appropriate    Objective   /64   Pulse 66   Temp (!) 96.8 °F (36 °C) (Tympanic Core)   Ht 5' 8\" (1.727 m)   Wt 51.7 kg (113 lb 14.4 oz)   SpO2 96%   BMI 17.32 kg/m²     Physical Exam:  Physical Exam  Vitals reviewed.   Constitutional:       General: He is not in acute distress.     Appearance: He is well-developed.   Eyes:      General: No scleral icterus.  Neck:      Vascular: No JVD.   Cardiovascular:      Rate and Rhythm: Normal rate and regular rhythm.   Pulmonary:      Breath sounds: No wheezing, rhonchi or rales.      Comments: Decreased throughout  Abdominal:      Tenderness: There is no abdominal tenderness.   Skin:     General: Skin is warm and dry.   Neurological:      Mental Status: He is alert and oriented to person, place, and time.       Diagnostic " Data:  Labs:  I personally reviewed the most recent laboratory data pertinent to today's visit.  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  There are additional nodular opacities and diffuse peribronchial thickening with peripheral bronchiectasis.  There is severe emphysema.    SPECT-CT 7/2/24  RIGHT LUNG COUNTS (%):  Right upper lobe:    35  Right middle lobe:     2  Right lower lobe:    9  Total:                47     RIGHT LUNG VOLUMES (%):  Right upper lobe:    24  Right middle lobe:     10  Right lower lobe:    16  Total:                50        LEFT LUNG:     LEFT LUNG COUNTS (%):  Left upper lobe:    44  Left lower lobe:    9  Total:                53     LEFT LUNG VOLUMES (%):  Left upper lobe:    25  Left lower lobe:    25  Total:                50    Performed 10/23/2023  FEV1/FVC ratio 30%    FEV1 36% predicted  FVC 88% predicted  No response to bronchodilators  Post BD FEV1 33%, 0.90 L   % predicted   % predicted  DLCO corrected for hemoglobin 27 % predicted  PFTs with severe obstruction, severe air trapping and hyperinflation, severe diffusion impairment     6/28/2024  FEV1/FVC Ratio: 37 %  Forced Vital Capacity: 2.82 L           80 % predicted  FEV1: 1.05 L40 % predicted     After administration of bronchodilator   FEV1/FVC Ratio: 32%  FVC: 3.06 L, 87% predicted, 8% change  FEV1: 0.96 L, 36% predicted, -7% change     Lung volumes by body plethysmography:   Total Lung Capacity 125 % predicted   Residual volume 201 % predicted     DLCO corrected for patients hemoglobin level: 28 %     ABG:  PH 7.46/PCO2 34.7/PO2 62/Bicarb 25/O2Sat 93     6WMT   Date 4/24/2024  Room air saturations at rest are 96%.  Patient ambulated for 6 minutes with saturations dropping to 86% at  minute 4.  He was then placed on supplemental oxygen at 2 L/min to complete testing.  Saturations remained 94% or greater on 2 L.  Distance walked 270m     Pulmonary Rehabilitation -completed May 2024     2D echocardiogram -5/22/2024, EF 60%, grade 1 diastolic dysfunction, RVSP could not be assessed    RHC performed on 10/22/2024 confirmed presence of pulmonary hypertension with a PA pressure of 50/28 and pulmonary capillary wedge pressure of 23    Echocardiogram performed on 1/23/2025 shows EF of 55%, moderately dilated right ventricle with normal systolic function and an RVSP of 59 mmHg            Ashlie Townsend DO

## 2025-01-31 NOTE — LETTER
2025     Ismael Null MD  [address] [place] [id number]    Patient: Richie Flowers   YOB: 1945   Date of Visit: 2025       Dear Dr. Null:    Thank you for referring Richie Flowers to me for evaluation. Below are my notes for this consultation.    If you have questions, please do not hesitate to call me. I look forward to following your patient along with you.         Sincerely,        Ashlie Townsend DO        CC: No Recipients    Ashlie Townsend DO  2025  3:42 PM  Sign when Signing Visit  Progress Note - Pulmonary Medicine     Name: Richie Flowers      : 1945      MRN: [id number]  Encounter Provider: Ashlie Townsend DO  Encounter Date: 2025   Encounter department: [facility]    :  Assessment & Plan  COPD, severe (HCC)  The patient would very much like to pursue BLVR.  He has completed all necessary testing.  Characteristics of his emphysema, perfusion pattern and PFTs, make him a candidate likely to respond.  Primary target would be left lower lobe, followed by right lower lobe.  My hesitation is related to underlying pulmonary hypertension.  I have discussed the case at length with Dr. Null at [facility].  He feels that the PA pressures are not a strict contraindication, however would warrant clear understanding from the patient that he is at greater risk of having complications.  We would need to take extra caution with his hemodynamics if we would move forward.     I also discussed at length with my team here at [facility].  Although the procedure itself would not be technically challenging, the overall sense is that the patient should undergo the procedure at [facility] where they would have additional expertise that could handle cardiovascular compromise should any issues arise.     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  He does not examine volume overloaded.  Dr. Proctor does not feel that additional therapy would be warranted or beneficial given this is largely related to underlying severe lung disease.    Chronic respiratory failure with hypoxia (HCC)  Patient requires oxygen at 2 L/min with exertion.  Room air saturations at rest are adequate    Lung nodules  Patient has scattered nodular opacities, likely on the basis of chronic MAC infection.  Plan to repeat CT to document stability/improvement  Orders:  •  CT chest without contrast; Future          History of Present Illness  Richie Flowers is a 79 y.o. male who presents for follow-up regarding severe COPD and chronic hypoxic respiratory failure, hoping to undergo bronchoscopic lung volume reduction.  Since his last visit with me, he underwent right heart catheterization which confirmed moderate pulmonary hypertension.  He was started on Lasix which he did not feel changed his exercise capacity in any way and he is frustrated at the frequency of urination.  He has no lower extremity edema.  He is struggling to keep his weight up.  Has a chronic cough and typically expectorates sputum 3-4 times per day.  No significant wheezing.  He uses Trelegy Ellipta once daily and does not find nebulizers to be beneficial.  He denies fevers chills or night sweats.    Review of Systems:  Review of Systems  Aside from what is mentioned in the HPI, ROS is otherwise negative         Medications:  Medication list reviewed and updated as appropriate    PMH, Social history, family history and tobacco history  Reviewed and updated as appropriate    Objective  /64   Pulse 66   Temp (!) 96.8 °F (36 °C) (Tympanic Core)   Ht 5' 8\" (1.727 m)   Wt 51.7 kg (113 lb 14.4 oz)   SpO2 96%   BMI 17.32 kg/m²     Physical Exam:  Physical Exam  Vitals reviewed. "   Constitutional:       General: He is not in acute distress.     Appearance: He is well-developed.   Eyes:      General: No scleral icterus.  Neck:      Vascular: No JVD.   Cardiovascular:      Rate and Rhythm: Normal rate and regular rhythm.   Pulmonary:      Breath sounds: No wheezing, rhonchi or rales.      Comments: Decreased throughout  Abdominal:      Tenderness: There is no abdominal tenderness.   Skin:     General: Skin is warm and dry.   Neurological:      Mental Status: He is alert and oriented to person, place, and time.       Diagnostic Data:  Labs:  I personally reviewed the most recent laboratory data pertinent to today's visit.  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  There are additional nodular opacities and diffuse peribronchial thickening with peripheral bronchiectasis.  There is severe emphysema.    SPECT-CT 7/2/24  RIGHT LUNG COUNTS (%):  Right upper lobe:    35  Right middle lobe:     2  Right lower lobe:    9  Total:                47     RIGHT LUNG VOLUMES (%):  Right upper lobe:    24  Right middle lobe:     10  Right lower lobe:    16  Total:                50        LEFT LUNG:     LEFT LUNG COUNTS (%):  Left upper lobe:    44  Left lower lobe:    9  Total:                53     LEFT LUNG VOLUMES (%):  Left upper lobe:    25  Left lower lobe:    25  Total:                50    Performed 10/23/2023  FEV1/FVC ratio 30%    FEV1 36% predicted  FVC 88% predicted  No response to bronchodilators  Post BD FEV1 33%, 0.90 L   % predicted   % predicted  DLCO corrected for hemoglobin 27 % predicted  PFTs with severe obstruction, severe air trapping and hyperinflation, severe diffusion impairment     6/28/2024  FEV1/FVC Ratio: 37 %  Forced Vital Capacity: 2.82 L            80 % predicted  FEV1: 1.05 L40 % predicted     After administration of bronchodilator   FEV1/FVC Ratio: 32%  FVC: 3.06 L, 87% predicted, 8% change  FEV1: 0.96 L, 36% predicted, -7% change     Lung volumes by body plethysmography:   Total Lung Capacity 125 % predicted   Residual volume 201 % predicted     DLCO corrected for patients hemoglobin level: 28 %     ABG:  PH 7.46/PCO2 34.7/PO2 62/Bicarb 25/O2Sat 93     6WMT   Date 4/24/2024  Room air saturations at rest are 96%.  Patient ambulated for 6 minutes with saturations dropping to 86% at minute 4.  He was then placed on supplemental oxygen at 2 L/min to complete testing.  Saturations remained 94% or greater on 2 L.  Distance walked 270m     Pulmonary Rehabilitation -completed May 2024     2D echocardiogram -5/22/2024, EF 60%, grade 1 diastolic dysfunction, RVSP could not be assessed    RHC performed on 10/22/2024 confirmed presence of pulmonary hypertension with a PA pressure of 50/28 and pulmonary capillary wedge pressure of 23    Echocardiogram performed on 1/23/2025 shows EF of 55%, moderately dilated right ventricle with normal systolic function and an RVSP of 59 mmHg            Ashlie Townsend DO

## 2025-01-31 NOTE — ASSESSMENT & PLAN NOTE
Echocardiogram done earlier this week shows RV dilation with preserved systolic function but no significant improvement in PA pressures since being on diuretic therapy.  He does not examine volume overloaded.  Dr. Proctor does not feel that additional therapy would be warranted or beneficial given this is largely related to underlying severe lung disease.

## 2025-01-31 NOTE — ASSESSMENT & PLAN NOTE
Patient has scattered nodular opacities, likely on the basis of chronic MAC infection.  Plan to repeat CT to document stability/improvement  Orders:    CT chest without contrast; Future

## 2025-02-07 ENCOUNTER — HOSPITAL ENCOUNTER (OUTPATIENT)
Dept: RADIOLOGY | Facility: IMAGING CENTER | Age: 80
End: 2025-02-07
Payer: COMMERCIAL

## 2025-02-07 DIAGNOSIS — R91.8 LUNG NODULES: ICD-10-CM

## 2025-02-07 PROCEDURE — 71250 CT THORAX DX C-: CPT

## 2025-02-18 ENCOUNTER — OFFICE VISIT (OUTPATIENT)
Dept: INTERNAL MEDICINE CLINIC | Age: 80
End: 2025-02-18
Payer: COMMERCIAL

## 2025-02-18 VITALS
BODY MASS INDEX: 17.73 KG/M2 | DIASTOLIC BLOOD PRESSURE: 74 MMHG | HEIGHT: 68 IN | OXYGEN SATURATION: 97 % | TEMPERATURE: 97.4 F | WEIGHT: 117 LBS | HEART RATE: 61 BPM | SYSTOLIC BLOOD PRESSURE: 126 MMHG

## 2025-02-18 DIAGNOSIS — I27.23 PULMONARY HYPERTENSION DUE TO COPD (HCC): ICD-10-CM

## 2025-02-18 DIAGNOSIS — J44.9 COPD, SEVERE (HCC): Primary | ICD-10-CM

## 2025-02-18 DIAGNOSIS — K22.70 BARRETT'S ESOPHAGUS WITHOUT DYSPLASIA: ICD-10-CM

## 2025-02-18 DIAGNOSIS — J44.9 PULMONARY HYPERTENSION DUE TO COPD (HCC): ICD-10-CM

## 2025-02-18 PROBLEM — J96.11 CHRONIC RESPIRATORY FAILURE WITH HYPOXIA (HCC): Status: RESOLVED | Noted: 2024-04-24 | Resolved: 2025-02-18

## 2025-02-18 PROBLEM — J20.9 ACUTE BRONCHITIS, UNSPECIFIED ORGANISM: Status: RESOLVED | Noted: 2024-11-13 | Resolved: 2025-02-18

## 2025-02-18 PROCEDURE — G2211 COMPLEX E/M VISIT ADD ON: HCPCS | Performed by: INTERNAL MEDICINE

## 2025-02-18 PROCEDURE — G0439 PPPS, SUBSEQ VISIT: HCPCS | Performed by: INTERNAL MEDICINE

## 2025-02-18 PROCEDURE — 99214 OFFICE O/P EST MOD 30 MIN: CPT | Performed by: INTERNAL MEDICINE

## 2025-02-18 RX ORDER — FAMOTIDINE 40 MG/1
40 TABLET, FILM COATED ORAL DAILY
Qty: 90 TABLET | Refills: 3 | Status: SHIPPED | OUTPATIENT
Start: 2025-02-18 | End: 2026-02-13

## 2025-02-18 NOTE — ASSESSMENT & PLAN NOTE
Severe COPD he is followed up at [facility] for [place] valves

## 2025-02-18 NOTE — ASSESSMENT & PLAN NOTE
Smith's esophagus he is followed up by the gastroenterology.  Stopped PPI because of the diarrhea  Orders:    famotidine (PEPCID) 40 MG tablet; Take 1 tablet (40 mg total) by mouth in the morning

## 2025-02-18 NOTE — PATIENT INSTRUCTIONS
Medicare Preventive Visit Patient Instructions  Thank you for completing your Welcome to Medicare Visit or Medicare Annual Wellness Visit today. Your next wellness visit will be due in one year (2/19/2026).  The screening/preventive services that you may require over the next 5-10 years are detailed below. Some tests may not apply to you based off risk factors and/or age. Screening tests ordered at today's visit but not completed yet may show as past due. Also, please note that scanned in results may not display below.  Preventive Screenings:  Service Recommendations Previous Testing/Comments   Colorectal Cancer Screening  Colonoscopy    Fecal Occult Blood Test (FOBT)/Fecal Immunochemical Test (FIT)  Fecal DNA/Cologuard Test  Flexible Sigmoidoscopy Age: 45-75 years old   Colonoscopy: every 10 years (May be performed more frequently if at higher risk)  OR  FOBT/FIT: every 1 year  OR  Cologuard: every 3 years  OR  Sigmoidoscopy: every 5 years  Screening may be recommended earlier than age 45 if at higher risk for colorectal cancer. Also, an individualized decision between you and your healthcare provider will decide whether screening between the ages of 76-85 would be appropriate. Colonoscopy: 12/23/2019  FOBT/FIT: Not on file  Cologuard: Not on file  Sigmoidoscopy: Not on file    History Colorectal Cancer     Prostate Cancer Screening Individualized decision between patient and health care provider in men between ages of 55-69   Medicare will cover every 12 months beginning on the day after your 50th birthday PSA: 0.3 ng/mL     History Prostate Cancer  Screening Not Indicated     Hepatitis C Screening Once for adults born between 1945 and 1965  More frequently in patients at high risk for Hepatitis C Hep C Antibody: 07/26/2021    Screening Current   Diabetes Screening 1-2 times per year if you're at risk for diabetes or have pre-diabetes Fasting glucose: 96 mg/dL (10/8/2024)  A1C: 5.4 % (3/11/2023)  Screening Current    Cholesterol Screening Once every 5 years if you don't have a lipid disorder. May order more often based on risk factors. Lipid panel: 08/09/2024  Screening Current      Other Preventive Screenings Covered by Medicare:  Abdominal Aortic Aneurysm (AAA) Screening: covered once if your at risk. You're considered to be at risk if you have a family history of AAA or a male between the age of 65-75 who smoking at least 100 cigarettes in your lifetime.  Lung Cancer Screening: covers low dose CT scan once per year if you meet all of the following conditions: (1) Age 55-77; (2) No signs or symptoms of lung cancer; (3) Current smoker or have quit smoking within the last 15 years; (4) You have a tobacco smoking history of at least 20 pack years (packs per day x number of years you smoked); (5) You get a written order from a healthcare provider.  Glaucoma Screening: covered annually if you're considered high risk: (1) You have diabetes OR (2) Family history of glaucoma OR (3)  aged 50 and older OR (4)  American aged 65 and older  Osteoporosis Screening: covered every 2 years if you meet one of the following conditions: (1) Have a vertebral abnormality; (2) On glucocorticoid therapy for more than 3 months; (3) Have primary hyperparathyroidism; (4) On osteoporosis medications and need to assess response to drug therapy.  HIV Screening: covered annually if you're between the age of 15-65. Also covered annually if you are younger than 15 and older than 65 with risk factors for HIV infection. For pregnant patients, it is covered up to 3 times per pregnancy.    Immunizations:  Immunization Recommendations   Influenza Vaccine Annual influenza vaccination during flu season is recommended for all persons aged >= 6 months who do not have contraindications   Pneumococcal Vaccine   * Pneumococcal conjugate vaccine = PCV13 (Prevnar 13), PCV15 (Vaxneuvance), PCV20 (Prevnar 20)  * Pneumococcal polysaccharide vaccine =  PPSV23 (Pneumovax) Adults 19-65 yo with certain risk factors or if 65+ yo  If never received any pneumonia vaccine: recommend Prevnar 20 (PCV20)  Give PCV20 if previously received 1 dose of PCV13 or PPSV23   Hepatitis B Vaccine 3 dose series if at intermediate or high risk (ex: diabetes, end stage renal disease, liver disease)   Respiratory syncytial virus (RSV) Vaccine - COVERED BY MEDICARE PART D  * RSVPreF3 (Arexvy) CDC recommends that adults 60 years of age and older may receive a single dose of RSV vaccine using shared clinical decision-making (SCDM)   Tetanus (Td) Vaccine - COST NOT COVERED BY MEDICARE PART B Following completion of primary series, a booster dose should be given every 10 years to maintain immunity against tetanus. Td may also be given as tetanus wound prophylaxis.   Tdap Vaccine - COST NOT COVERED BY MEDICARE PART B Recommended at least once for all adults. For pregnant patients, recommended with each pregnancy.   Shingles Vaccine (Shingrix) - COST NOT COVERED BY MEDICARE PART B  2 shot series recommended in those 19 years and older who have or will have weakened immune systems or those 50 years and older     Health Maintenance Due:      Topic Date Due   • Hepatitis C Screening  Completed   • Lung Cancer Screening  Discontinued     Immunizations Due:      Topic Date Due   • Hepatitis A Vaccine (1 of 2 - Risk 2-dose series) Never done   • COVID-19 Vaccine (2 - 2024-25 season) 09/01/2024     Advance Directives   What are advance directives?  Advance directives are legal documents that state your wishes and plans for medical care. These plans are made ahead of time in case you lose your ability to make decisions for yourself. Advance directives can apply to any medical decision, such as the treatments you want, and if you want to donate organs.   What are the types of advance directives?  There are many types of advance directives, and each state has rules about how to use them. You may choose a  combination of any of the following:  Living will:  This is a written record of the treatment you want. You can also choose which treatments you do not want, which to limit, and which to stop at a certain time. This includes surgery, medicine, IV fluid, and tube feedings.   Durable power of  for healthcare (DPAHC):  This is a written record that states who you want to make healthcare choices for you when you are unable to make them for yourself. This person, called a proxy, is usually a family member or a friend. You may choose more than 1 proxy.  Do not resuscitate (DNR) order:  A DNR order is used in case your heart stops beating or you stop breathing. It is a request not to have certain forms of treatment, such as CPR. A DNR order may be included in other types of advance directives.  Medical directive:  This covers the care that you want if you are in a coma, near death, or unable to make decisions for yourself. You can list the treatments you want for each condition. Treatment may include pain medicine, surgery, blood transfusions, dialysis, IV or tube feedings, and a ventilator (breathing machine).  Values history:  This document has questions about your views, beliefs, and how you feel and think about life. This information can help others choose the care that you would choose.  Why are advance directives important?  An advance directive helps you control your care. Although spoken wishes may be used, it is better to have your wishes written down. Spoken wishes can be misunderstood, or not followed. Treatments may be given even if you do not want them. An advance directive may make it easier for your family to make difficult choices about your care.   Underweight  Underweight is defined as having a body mass index (BMI) of less than 18.5 kg/m2   Anorexia  is a loss of appetite, decreased food intake, or both. Your appetite naturally decreases as you get older. You also get full faster than you used  to. This occurs because your body needs less energy. Other body changes can also lead to a decreased appetite. Even though some appetite loss is normal, you still need to get enough calories and nutrients to keep you healthy. You can start to lose too much weight if you do not eat as much food as your body needs. Unwanted weight loss can cause health problems, or worsen health problems you already have. You can also become dehydrated if you do not drink enough liquid.  How to eat healthy and get enough nutrients:   Choose healthy foods.  Eat a variety of fruits, vegetables, whole grains, low-fat dairy foods, lean meats, and other protein foods. Limit foods high in fat, sugar, and salt. Limit or avoid alcohol as directed. Work with a dietitian to help you plan your meals if you need to follow a special diet. A dietitian can also teach you how to modify foods if you have trouble chewing or swallowing.   Snack on healthy foods between meals  if you only eat a small amount during meals. Snacks provide extra healthy nutrients and calories between meals. Examples include fruit, cheese, and whole grain crackers.   Drink liquids as directed  to avoid dehydration. Drink liquids between meals if they cause you to get full too quickly during meals. Ask how much liquid to drink each day and which liquids are best for you.   Use herbs, spices, and flavor enhancers to add flavor to foods.  Avoid using herbs and spice blends that also contain sodium. Ask your healthcare provider or dietitian about flavor enhancers. Flavor enhancers with ham, natural perez, and roast beef flavors can also be sprinkled on food to add flavor.   Share meals with others as often as you can.  Eating with others may help you to eat better during meal time. Ask family members, neighbors, or friends to join you for lunch. There are also senior centers where you can meet people, and share meals with them.   Ask family and friends for help  with shopping or  preparing foods. Ask for a ride to the grocery store, if needed.       © Copyright [employer] 2018 Information is for End User's use only and may not be sold, redistributed or otherwise used for commercial purposes. All illustrations and images included in CareNotes® are the copyrighted property of [employer].D.A[employer]M., Inc[employer] or [employer]

## 2025-02-18 NOTE — ASSESSMENT & PLAN NOTE
Patient is not using any diuretic for pulmonary hypertension and certainly he does not have any fluid overload he is euvolemic

## 2025-02-18 NOTE — PROGRESS NOTES
Name: Richie Flowers      : 1945      MRN: [id number]  Encounter Provider: Indu Shepard MD  Encounter Date: 2025   Encounter department: [facility]    Assessment & Plan  COPD, severe (HCC)  Severe COPD he is followed up at 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  Stopped PPI because of the diarrhea  Orders:    famotidine (PEPCID) 40 MG tablet; Take 1 tablet (40 mg total) by mouth in the morning       Preventive health issues were discussed with patient, and age appropriate screening tests were ordered as noted in patient's After Visit Summary. Personalized health advice and appropriate referrals for health education or preventive services given if needed, as noted in patient's After Visit Summary.    History of Present Illness     HPI   Patient Care Team:  Indu Shepard MD as PCP - General (Internal Medicine)    Review of Systems  Medical History Reviewed by provider this encounter:       Annual Wellness Visit Questionnaire   Richie is here for his Subsequent Wellness visit. Last Medicare Wellness visit information reviewed, patient interviewed and updates made to the record today.      Health Risk Assessment:   Patient rates overall health as good. Patient feels that their physical health rating is slightly worse. Patient is satisfied with their life. Eyesight was rated as same. Hearing was rated as same. Patient feels that their emotional and mental health rating is same. Patients states they are never, rarely angry. Patient states they are often unusually tired/fatigued. Pain experienced in the last 7 days has been none. Patient states that he has experienced weight loss or  gain in last 6 months.     Depression Screening:   PHQ-2 Score: 0      Fall Risk Screening:   In the past year, patient has experienced: no history of falling in past year      Home Safety:  Patient has trouble with stairs inside or outside of their home. Patient has working smoke alarms and has working carbon monoxide detector. Home safety hazards include: none.     Nutrition:   Current diet is Regular.     Medications:   Patient is currently taking over-the-counter supplements. OTC medications include: see medication list. Patient is able to manage medications.     Activities of Daily Living (ADLs)/Instrumental Activities of Daily Living (IADLs):   Walk and transfer into and out of bed and chair?: Yes  Dress and groom yourself?: Yes    Bathe or shower yourself?: Yes    Feed yourself? Yes  Do your laundry/housekeeping?: Yes  Manage your money, pay your bills and track your expenses?: Yes  Make your own meals?: Yes    Do your own shopping?: Yes    Previous Hospitalizations:   Any hospitalizations or ED visits within the last 12 months?: No      Advance Care Planning:   Living will: Yes    Durable POA for healthcare: Yes    Advanced directive: Yes    Advanced directive counseling given: Yes      Cognitive Screening:   Provider or family/friend/caregiver concerned regarding cognition?: No    PREVENTIVE SCREENINGS      Cardiovascular Screening:    General: Screening Current      Diabetes Screening:     General: Screening Current      Colorectal Cancer Screening:     General: History Colorectal Cancer, Screening Current and Screening Not Indicated      Prostate Cancer Screening:    General: History Prostate Cancer, Screening Not Indicated and Screening Current      Osteoporosis Screening:    General: Screening Current and Screening Not Indicated      Abdominal Aortic Aneurysm (AAA) Screening:    Risk factors include: tobacco use        General: Screening Current      Lung Cancer Screening:     General: Screening Not  Indicated and Screening Current      Hepatitis C Screening:    General: Screening Current    Screening, Brief Intervention, and Referral to Treatment (SBIRT)     Screening  Typical number of drinks in a day: 1  Typical number of drinks in a week: 6  Interpretation: Low risk drinking behavior.    Single Item Drug Screening:  How often have you used an illegal drug (including marijuana) or a prescription medication for non-medical reasons in the past year? never    Single Item Drug Screen Score: 0  Interpretation: Negative screen for possible drug use disorder    Other Counseling Topics:   Calcium and vitamin D intake and regular weightbearing exercise.     Social Drivers of Health     Financial Resource Strain: Low Risk  (2/14/2024)    Overall Financial Resource Strain (CARDIA)     Difficulty of Paying Living Expenses: Not hard at all   Food Insecurity: No Food Insecurity (4/6/2023)    Hunger Vital Sign     Worried About Running Out of Food in the Last Year: Never true     Ran Out of Food in the Last Year: Never true   Transportation Needs: No Transportation Needs (2/14/2024)    PRAPARE - Transportation     Lack of Transportation (Medical): No     Lack of Transportation (Non-Medical): No   Housing Stability: Low Risk  (4/6/2023)    Housing Stability Vital Sign     Unable to Pay for Housing in the Last Year: No     Number of Places Lived in the Last Year: 1     Unstable Housing in the Last Year: No     No results found.    Objective   There were no vitals taken for this visit.    Physical Exam  Vitals and nursing note reviewed.   Constitutional:       Appearance: Normal appearance. He is normal weight.   HENT:      Head: Normocephalic and atraumatic.      Right Ear: Tympanic membrane normal.      Left Ear: Tympanic membrane normal.      Nose: Nose normal.      Mouth/Throat:      Mouth: Mucous membranes are moist.   Eyes:      Extraocular Movements: Extraocular movements intact.      Pupils: Pupils are equal, round,  and reactive to light.   Cardiovascular:      Rate and Rhythm: Normal rate and regular rhythm.      Pulses: Normal pulses.      Heart sounds: Normal heart sounds.   Pulmonary:      Effort: Pulmonary effort is normal.      Breath sounds: Decreased air movement present. Decreased breath sounds present.   Abdominal:      General: Abdomen is flat. Bowel sounds are normal.      Palpations: Abdomen is soft.   Musculoskeletal:         General: No swelling, tenderness or deformity. Normal range of motion.      Cervical back: Normal range of motion and neck supple.   Skin:     General: Skin is warm.      Capillary Refill: Capillary refill takes 2 to 3 seconds.   Neurological:      General: No focal deficit present.      Mental Status: He is alert and oriented to person, place, and time. Mental status is at baseline.   Psychiatric:         Mood and Affect: Mood normal.         Behavior: Behavior normal.

## 2025-03-04 LAB — SCAN RESULT: NORMAL

## 2025-03-12 ENCOUNTER — OFFICE VISIT (OUTPATIENT)
Dept: INTERNAL MEDICINE CLINIC | Age: 80
End: 2025-03-12
Payer: COMMERCIAL

## 2025-03-12 VITALS
OXYGEN SATURATION: 94 % | HEIGHT: 68 IN | HEART RATE: 70 BPM | DIASTOLIC BLOOD PRESSURE: 72 MMHG | BODY MASS INDEX: 17.43 KG/M2 | TEMPERATURE: 97.3 F | WEIGHT: 115 LBS | SYSTOLIC BLOOD PRESSURE: 100 MMHG

## 2025-03-12 DIAGNOSIS — K22.70 BARRETT'S ESOPHAGUS WITHOUT DYSPLASIA: ICD-10-CM

## 2025-03-12 DIAGNOSIS — J44.9 COPD, SEVERE (HCC): Primary | ICD-10-CM

## 2025-03-12 DIAGNOSIS — I27.23 PULMONARY HYPERTENSION DUE TO COPD (HCC): ICD-10-CM

## 2025-03-12 DIAGNOSIS — N13.8 BENIGN PROSTATIC HYPERPLASIA WITH URINARY OBSTRUCTION: ICD-10-CM

## 2025-03-12 DIAGNOSIS — N40.1 BENIGN PROSTATIC HYPERPLASIA WITH URINARY OBSTRUCTION: ICD-10-CM

## 2025-03-12 DIAGNOSIS — D22.9 MULTIPLE ATYPICAL SKIN MOLES: ICD-10-CM

## 2025-03-12 DIAGNOSIS — K44.9 HIATAL HERNIA: ICD-10-CM

## 2025-03-12 DIAGNOSIS — J44.9 PULMONARY HYPERTENSION DUE TO COPD (HCC): ICD-10-CM

## 2025-03-12 PROCEDURE — 99495 TRANSJ CARE MGMT MOD F2F 14D: CPT | Performed by: INTERNAL MEDICINE

## 2025-03-12 RX ORDER — METOPROLOL SUCCINATE 50 MG/1
75 TABLET, EXTENDED RELEASE ORAL DAILY
COMMUNITY
Start: 2025-03-07 | End: 2025-04-06

## 2025-03-12 RX ORDER — TAMSULOSIN HYDROCHLORIDE 0.4 MG/1
0.4 CAPSULE ORAL
Qty: 90 CAPSULE | Refills: 3 | Status: SHIPPED | OUTPATIENT
Start: 2025-03-12

## 2025-03-12 NOTE — ASSESSMENT & PLAN NOTE
Patient has some pulmonary hypertension was given Lasix or furosemide but he did not take it right now I do not see any reason to put any medication for diuresis

## 2025-03-12 NOTE — PROGRESS NOTES
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 male here for TCM follow up.     During the TCM phone call patient stated:  TCM Call (since 2025)       Date and time call was made  3/10/2025 11:18 AM    Hospital care reviewed  Records reviewed    Patient was hospitialized at  Other (comment)    Comment  [facility]    Date of Admission  25    Date of discharge  25    Diagnosis  panlobular emphysema    Disposition  Home    Current Symptoms  None          TCM Call (since 2025)       Post hospital issues  None    Scheduled for follow up?  Yes    Did you obtain your prescribed medications  Yes    Do you need help managing your prescriptions or medications  No    Is transportation to your appointment needed  No    I have advised the patient to call PCP with any new or worsening symptoms  Ike Nascimento CMA            She is a very pleasant 79 years young gentleman with a severe COPD he went to [facility] for [place]ner valve surgery he  is status post [place] valve surgery when he was admitted to the hospital he developed rapid heartbeat was diagnosed with multifocal atrial tachycardia and was started on metoprolol he is doing good no palpitation no increasing shortness of breath he think that his breathing is slightly better than before.  Not have significant cough he does have some cough but it is usual for him patient does not smoke he stopped smoking he does not have any other symptoms right now.  A very active gentleman and work almost full-time            Richie Flowers is a 79 y.o. (community dwelling, cognitively intact, functionally independent with ADLs & IADLs) male, PMH of severe COPD and chronic hypoxic respiratory failure (on home 2L NC), pulmonary hypertension type 3, Smith's esophagus, who presented to [facility] in irregularly irregular rhythm during preoperative evaluation for bronchoscopic lung volume reduction, found to have multifocal atrial tachycardia now stabilized on rate control, now s/p endobronchial valves on 3/5, transferred from ACE to Medicine for post-procedural monitoring.     Included here is a brief hospital course by problem:    # Multifocal atrial tachycardia, now rate controlled  #Severe COPD (GOLD C) with chronic hypercarbia  #Chronic hypoxic respiratory failure, on home 2L NC  Pt initially presented from outpatient pulmonary appointment for c/f afib with RVR. Cardiology consulted in the ED, felt abnormal rhythm was most consistent with MAT in the setting of severe COPD. Pt's rate improved with metoprolol tartrate 25mg q8hs, pt was discharged on metoprolol tartrate to consolidate pill burden.     #Pulmonary hypertension (WHO Class III), now s/p BLVR (3/5/25)  Interventional pulmonary performed BLVR surgery, pt tolerated procedure well. Pt was monitored for pneumothorax with serial CXR for first four days post-op. Pt was managed symptomatically with albuterol/ipratropium and guaifenesin and codeine. Pt referred  "for outpatient follow up with pulmonary team 2 weeks after discharge with same day CXR.     # Barretts esophagus without dysplasia  Outpatient follow-up with PCP, GI for EGD surveillance as indicated    The remainder of his longstanding comorbidities were managed with chronic therapies.     Complaining of difficulty in urination, frequency and urgency and nocturia.        Review of Systems  Objective   /72 (BP Location: Left arm, Patient Position: Sitting, Cuff Size: Standard)   Pulse 70   Temp (!) 97.3 °F (36.3 °C) (Temporal)   Ht 5' 8\" (1.727 m)   Wt 52.2 kg (115 lb)   SpO2 94%   BMI 17.49 kg/m²     Physical Exam  Constitutional:       Appearance: He is well-developed.   HENT:      Right Ear: Tympanic membrane and external ear normal.      Left Ear: Tympanic membrane normal.   Eyes:      Conjunctiva/sclera: Conjunctivae normal.      Pupils: Pupils are equal, round, and reactive to light.   Neck:      Thyroid: No thyromegaly.      Vascular: No JVD.   Cardiovascular:      Rate and Rhythm: Normal rate and regular rhythm.      Heart sounds: Normal heart sounds.   Pulmonary:      Breath sounds: Decreased air movement present. Examination of the right-middle field reveals decreased breath sounds. Examination of the left-middle field reveals decreased breath sounds. Examination of the right-lower field reveals decreased breath sounds. Examination of the left-lower field reveals decreased breath sounds. Decreased breath sounds present.   Abdominal:      General: Bowel sounds are normal.      Palpations: Abdomen is soft.   Musculoskeletal:         General: Normal range of motion.      Cervical back: Normal range of motion.   Lymphadenopathy:      Cervical: No cervical adenopathy.   Skin:     General: Skin is dry.   Neurological:      General: No focal deficit present.      Mental Status: He is alert and oriented to person, place, and time. Mental status is at baseline.      Deep Tendon Reflexes: Reflexes are " normal and symmetric.   Psychiatric:         Mood and Affect: Mood normal.         Behavior: Behavior normal.

## 2025-03-13 ENCOUNTER — TELEPHONE (OUTPATIENT)
Age: 80
End: 2025-03-13

## 2025-03-13 NOTE — TELEPHONE ENCOUNTER
New Patient    Appointment Scheduling  What office location does the patient prefer?: [place]   What is the reason for the patient's appointment?: Benign prostatic hyperplasia with urinary obstruction   Have patient records been requested?:  If No, are the records showing in Epic:     Appointment Details  Date: 04/01/25  Time:    8:40 AM   Location:   [place]   Provider:  Al   Does the appointment need further review? (Reason)      HISTORY  Is the patient having active symptoms? If so, describe symptoms:   Has the patient had any previous Urologist(s)?[place] [facility] [place]  Was the patient seen in the ED?: 03/04/25  Has the patient had any outside testing done?: no   Does patient have Imaging/Lab Results: no  Have you had Cancer no    INSURANCE   Have you confirmed Patient's insurance? yes  Is the insurance accepted?  Yes   Is the insurance active? Yes

## 2025-03-27 ENCOUNTER — HOSPITAL ENCOUNTER (OUTPATIENT)
Dept: RADIOLOGY | Facility: IMAGING CENTER | Age: 80
End: 2025-03-27
Payer: COMMERCIAL

## 2025-03-27 DIAGNOSIS — J44.9 CHRONIC OBSTRUCTIVE PULMONARY DISEASE, UNSPECIFIED COPD TYPE (HCC): ICD-10-CM

## 2025-03-27 PROCEDURE — 71045 X-RAY EXAM CHEST 1 VIEW: CPT

## 2025-04-01 ENCOUNTER — OFFICE VISIT (OUTPATIENT)
Dept: UROLOGY | Facility: AMBULATORY SURGERY CENTER | Age: 80
End: 2025-04-01
Payer: COMMERCIAL

## 2025-04-01 VITALS
HEART RATE: 67 BPM | SYSTOLIC BLOOD PRESSURE: 138 MMHG | DIASTOLIC BLOOD PRESSURE: 66 MMHG | RESPIRATION RATE: 16 BRPM | HEIGHT: 68 IN | BODY MASS INDEX: 17.91 KG/M2 | WEIGHT: 118.2 LBS

## 2025-04-01 DIAGNOSIS — C61 PROSTATE CANCER (HCC): ICD-10-CM

## 2025-04-01 DIAGNOSIS — N52.35 ERECTILE DYSFUNCTION FOLLOWING RADIATION THERAPY: ICD-10-CM

## 2025-04-01 DIAGNOSIS — N13.8 BENIGN PROSTATIC HYPERPLASIA WITH URINARY OBSTRUCTION: Primary | ICD-10-CM

## 2025-04-01 DIAGNOSIS — N40.1 BENIGN PROSTATIC HYPERPLASIA WITH URINARY OBSTRUCTION: Primary | ICD-10-CM

## 2025-04-01 LAB
POST-VOID RESIDUAL VOLUME, ML POC: 121 ML
SL AMB  POCT GLUCOSE, UA: NORMAL
SL AMB LEUKOCYTE ESTERASE,UA: NORMAL
SL AMB POCT BILIRUBIN,UA: NORMAL
SL AMB POCT BLOOD,UA: NORMAL
SL AMB POCT CLARITY,UA: CLEAR
SL AMB POCT COLOR,UA: YELLOW
SL AMB POCT KETONES,UA: NORMAL
SL AMB POCT NITRITE,UA: NORMAL
SL AMB POCT PH,UA: 7.5
SL AMB POCT SPECIFIC GRAVITY,UA: 1.01
SL AMB POCT URINE PROTEIN: NORMAL
SL AMB POCT UROBILINOGEN: 0.2

## 2025-04-01 PROCEDURE — 51798 US URINE CAPACITY MEASURE: CPT

## 2025-04-01 PROCEDURE — 99204 OFFICE O/P NEW MOD 45 MIN: CPT

## 2025-04-01 PROCEDURE — 81002 URINALYSIS NONAUTO W/O SCOPE: CPT

## 2025-04-01 RX ORDER — FINASTERIDE 5 MG/1
5 TABLET, FILM COATED ORAL DAILY
Qty: 90 TABLET | Refills: 2 | Status: SHIPPED | OUTPATIENT
Start: 2025-04-01

## 2025-04-01 NOTE — ASSESSMENT & PLAN NOTE
Status post AMS 3 piece penile prosthesis placed on 3/8/2016  Patient reports that the device no longer pumps to rigidity.  However, the patient is no longer sexually active and defers referral to an MD who specializes in the penile prosthesis.

## 2025-04-01 NOTE — PROGRESS NOTES
4/1/2025      Assessment and Plan    79 y.o. male new patient to [facility] urology    Prostate cancer ([facility])  History of [place] 7 prostate cancer status post external beam radiation therapy completed in May 2015  Patient's last PSA was performed 7/26/2021 and found to be 0.3.  Refer to PSA trend below.  We discussed that it has been several years since the patient had his PSA tested.  We discussed that he is a decade out from his radiation therapy.  We discussed repeating a PSA within next 1 to 2 months to reevaluate his PSA with his history of prostate cancer.  Patient will undergo PSA testing in our office will call for results    Lab Results   Component Value Date    PSA 0.3 07/26/2021    PSA 0.4 12/13/2018    PSA 0.5 12/12/2017        Benign prostatic hyperplasia with urinary obstruction  Patient was trialed on Flomax 0.4 mg daily, but reported side effects of frequency and urgency  We discussed further pharmacotherapy for treatment of his lower urinary tract symptoms including 5 alpha reductase inhibitors.  Patient will trial finasteride 5 mg daily for treatment of his lower urinary tract symptoms.  It was noted that this medication may take up to 4 to 6 months to provide maximal benefit.  Side effects were reviewed in the office.  Patient will return to the office in 4 months to reevaluate lower urinary tract symptoms on finasteride.    Erectile dysfunction  Status post AMS 3 piece penile prosthesis placed on 3/8/2016  Patient reports that the device no longer pumps to rigidity.  However, the patient is no longer sexually active and defers referral to an MD who specializes in the penile prosthesis.        History of Present Illness  Richie Flowers is a 79 y.o. male here for evaluation of history of small volume [place] 7 prostate cancer status post external beam radiation therapy completed in May 2015, erectile dysfunction status post placement of a AMS 3 piece penile prosthesis performed  "3/8/2016, and BPH with obstruction/lower urinary tract symptoms.  Patient was last seen in our office on 5/10/2016.  Patient is currently maintained on Flomax 0.4 mg daily for treatment of his lower urinary tract symptoms.  Patient's last PSA was performed 7/26/2021 and found to be 0.3.    Today, the patient reports that over the last year he has started to notice increased nocturia ranging from 4-5 times a night, a weakened urinary stream, and increased urinary frequency.  Patient is mostly bothered by his long times to void and weakened urinary stream.  Patient notes that his PCP did trial him on Flomax 0.4 mg, but the patient notes that this worsened his frequency and urgency and discontinued the use of the medication.  Otherwise, the patient denies dysuria, hematuria, flank pain, or episodes of urinary incontinence.  Patient notes that he the penile prosthesis that was placed many years ago no longer functions and he has tried to pump it up, but it does not strengthen.  Patient is not bothered by this at this time as his wife has since passed away and he is no longer sexually active.        Review of Systems   Constitutional:  Negative for chills and fever.   HENT:  Negative for ear pain and sore throat.    Eyes:  Negative for pain and visual disturbance.   Respiratory:  Negative for cough and shortness of breath.    Cardiovascular:  Negative for chest pain and palpitations.   Gastrointestinal:  Negative for abdominal pain and vomiting.   Genitourinary:  Negative for dysuria and hematuria.   Musculoskeletal:  Negative for arthralgias and back pain.   Skin:  Negative for color change and rash.   Neurological:  Negative for seizures and syncope.   All other systems reviewed and are negative.               Vitals  Vitals:    04/01/25 0859   BP: 138/66   Patient Position: Sitting   Cuff Size: Adult   Pulse: 67   Resp: 16   Weight: 53.6 kg (118 lb 3.2 oz)   Height: 5' 8\" (1.727 m)       Physical Exam      Past " "History  Past Medical History:   Diagnosis Date    Arthritis     Smith's esophagus     Chronic cough     per pt since quit smoking less coughing-slightly yellow    Chronic pain disorder     hip    Colon polyp     COPD (chronic obstructive pulmonary disease) (HCC)     Dental bridge present     Esophageal reflux     GERD (gastroesophageal reflux disease)     History of kidney stones     History of radiation therapy     Inguinal hernia     \"had surgery\"    Moderate exercise     has landscape business and enjoys remodeling properties    Rectal adenocarcinoma (HCC)     per pt not sure of this    Right hip pain     Shortness of breath     \"with activity not if sitting or sleeping\"    Shoulder pain     left--\"feels like cracking\"    Spermatocele     \"had surgery\"     Social History     Socioeconomic History    Marital status: Single     Spouse name: None    Number of children: None    Years of education: None    Highest education level: None   Occupational History    None   Tobacco Use    Smoking status: Former     Current packs/day: 0.00     Average packs/day: 0.5 packs/day for 68.0 years (34.0 ttl pk-yrs)     Types: Cigarettes     Start date:      Quit date: 2023     Years since quittin.2    Smokeless tobacco: Never   Vaping Use    Vaping status: Never Used   Substance and Sexual Activity    Alcohol use: Yes     Alcohol/week: 14.0 standard drinks of alcohol     Types: 14 Standard drinks or equivalent per week     Comment: 2 michelle meyers and coke per a day    Drug use: No    Sexual activity: None     Comment: defer   Other Topics Concern    None   Social History Narrative    None     Social Drivers of Health     Financial Resource Strain: Low Risk  (2024)    Overall Financial Resource Strain (CARDIA)     Difficulty of Paying Living Expenses: Not hard at all   Food Insecurity: No Food Insecurity (3/5/2025)    Received from [facility]    Hunger Vital Sign     Worried About " Running Out of Food in the Last Year: Never true     Ran Out of Food in the Last Year: Never true   Transportation Needs: No Transportation Needs (3/5/2025)    Received from [facility]    PRAPARE - Transportation     Lack of Transportation (Medical): No     Lack of Transportation (Non-Medical): No   Physical Activity: Not on file   Stress: Not on file   Social Connections: Not on file   Intimate Partner Violence: Not on file   Housing Stability: Low Risk  (3/5/2025)    Received from [facility]    Housing Stability Vital Sign     Unable to Pay for Housing in the Last Year: No     Number of Times Moved in the Last Year: 1     Homeless in the Last Year: No     Social History     Tobacco Use   Smoking Status Former    Current packs/day: 0.00    Average packs/day: 0.5 packs/day for 68.0 years (34.0 ttl pk-yrs)    Types: Cigarettes    Start date:     Quit date: 2023    Years since quittin.2   Smokeless Tobacco Never     Family History   Problem Relation Age of Onset    No Known Problems Mother     No Known Problems Father        The following portions of the patient's history were reviewed and updated as appropriate: allergies, current medications, past medical history, past social history, past surgical history and problem list.    Results  Recent Results (from the past hour)   POCT urine dip    Collection Time: 25  9:07 AM   Result Value Ref Range    LEUKOCYTE ESTERASE,UA -     NITRITE,UA -     SL AMB POCT UROBILINOGEN 0.2     POCT URINE PROTEIN -      PH,UA 7.5     BLOOD,UA -     SPECIFIC GRAVITY,UA 1.010     KETONES,UA -     BILIRUBIN,UA -     GLUCOSE, UA -      COLOR,UA yellow     CLARITY,UA clear    POCT Measure PVR    Collection Time: 25  9:07 AM   Result Value Ref Range    POST-VOID RESIDUAL VOLUME, ML  mL   ]  Lab Results   Component Value Date    PSA 0.3 2021    PSA 0.4 2018    PSA 0.5 2017    PSA 0.6  10/04/2016     Lab Results   Component Value Date    CALCIUM 8.8 (L) 03/08/2025    K 4.4 03/08/2025    CO2 25 03/08/2025     03/08/2025    BUN 18 03/08/2025    CREATININE 0.87 03/08/2025     Lab Results   Component Value Date    WBC 7.50 10/08/2024    HGB 15.1 10/08/2024    HCT 47.6 10/08/2024     (H) 10/08/2024     10/08/2024

## 2025-04-01 NOTE — ASSESSMENT & PLAN NOTE
Patient was trialed on Flomax 0.4 mg daily, but reported side effects of frequency and urgency  We discussed further pharmacotherapy for treatment of his lower urinary tract symptoms including 5 alpha reductase inhibitors.  Patient will trial finasteride 5 mg daily for treatment of his lower urinary tract symptoms.  It was noted that this medication may take up to 4 to 6 months to provide maximal benefit.  Side effects were reviewed in the office.  Patient will return to the office in 4 months to reevaluate lower urinary tract symptoms on finasteride.

## 2025-04-01 NOTE — ASSESSMENT & PLAN NOTE
History of [place] 7 prostate cancer status post external beam radiation therapy completed in May 2015  Patient's last PSA was performed 7/26/2021 and found to be 0.3.  Refer to PSA trend below.  We discussed that it has been several years since the patient had his PSA tested.  We discussed that he is a decade out from his radiation therapy.  We discussed repeating a PSA within next 1 to 2 months to reevaluate his PSA with his history of prostate cancer.  Patient will undergo PSA testing in our office will call for results    Lab Results   Component Value Date    PSA 0.3 07/26/2021    PSA 0.4 12/13/2018    PSA 0.5 12/12/2017

## 2025-04-03 ENCOUNTER — HOSPITAL ENCOUNTER (OUTPATIENT)
Dept: PULMONOLOGY | Facility: HOSPITAL | Age: 80
End: 2025-04-03
Payer: COMMERCIAL

## 2025-04-03 DIAGNOSIS — J44.9 COPD (CHRONIC OBSTRUCTIVE PULMONARY DISEASE) (HCC): ICD-10-CM

## 2025-04-03 DIAGNOSIS — J44.9 CHRONIC OBSTRUCTIVE PULMONARY DISEASE, UNSPECIFIED COPD TYPE (HCC): Primary | ICD-10-CM

## 2025-04-03 PROCEDURE — 94060 EVALUATION OF WHEEZING: CPT | Performed by: INTERNAL MEDICINE

## 2025-04-03 PROCEDURE — 94729 DIFFUSING CAPACITY: CPT | Performed by: INTERNAL MEDICINE

## 2025-04-03 PROCEDURE — 94729 DIFFUSING CAPACITY: CPT

## 2025-04-03 PROCEDURE — 94060 EVALUATION OF WHEEZING: CPT

## 2025-04-03 PROCEDURE — 94760 N-INVAS EAR/PLS OXIMETRY 1: CPT

## 2025-04-03 PROCEDURE — 94726 PLETHYSMOGRAPHY LUNG VOLUMES: CPT

## 2025-04-03 PROCEDURE — 94726 PLETHYSMOGRAPHY LUNG VOLUMES: CPT | Performed by: INTERNAL MEDICINE

## 2025-04-03 RX ORDER — ALBUTEROL SULFATE 0.83 MG/ML
2.5 SOLUTION RESPIRATORY (INHALATION) ONCE
Status: COMPLETED | OUTPATIENT
Start: 2025-04-03 | End: 2025-04-03

## 2025-04-03 RX ADMIN — ALBUTEROL SULFATE 2.5 MG: 2.5 SOLUTION RESPIRATORY (INHALATION) at 09:00

## 2025-04-21 ENCOUNTER — TELEPHONE (OUTPATIENT)
Dept: PULMONOLOGY | Facility: CLINIC | Age: 80
End: 2025-04-21

## 2025-04-21 NOTE — TELEPHONE ENCOUNTER
Patient called requesting refill for Trelegy. Patient made aware medication was refilled on 1/13/2025 for 180 blisters with 1 refills to [facility] pharmacy. Patient instructed to contact the pharmacy to obtain refills of medication. Patient verbalized understanding.       Under dispense history shows one refill gave patient [facility] contact number

## 2025-05-13 ENCOUNTER — TELEPHONE (OUTPATIENT)
Age: 80
End: 2025-05-13

## 2025-05-13 NOTE — TELEPHONE ENCOUNTER
Pt called asking if Dr Shepard could cancel the order for his oxygen supplies so [facility] can pick it up    Provided ph# [phone] and acct# [id number]  Please advise

## 2025-06-03 ENCOUNTER — OFFICE VISIT (OUTPATIENT)
Dept: INTERNAL MEDICINE CLINIC | Age: 80
End: 2025-06-03
Payer: COMMERCIAL

## 2025-06-03 ENCOUNTER — APPOINTMENT (OUTPATIENT)
Dept: LAB | Facility: IMAGING CENTER | Age: 80
End: 2025-06-03
Payer: COMMERCIAL

## 2025-06-03 VITALS
DIASTOLIC BLOOD PRESSURE: 70 MMHG | HEIGHT: 68 IN | WEIGHT: 120.6 LBS | BODY MASS INDEX: 18.28 KG/M2 | SYSTOLIC BLOOD PRESSURE: 120 MMHG | TEMPERATURE: 99.1 F | OXYGEN SATURATION: 92 % | HEART RATE: 74 BPM

## 2025-06-03 DIAGNOSIS — C61 PROSTATE CANCER (HCC): ICD-10-CM

## 2025-06-03 DIAGNOSIS — K44.9 HIATAL HERNIA: ICD-10-CM

## 2025-06-03 DIAGNOSIS — R97.20 ELEVATED PROSTATE SPECIFIC ANTIGEN (PSA): ICD-10-CM

## 2025-06-03 DIAGNOSIS — J44.9 COPD, SEVERE (HCC): Primary | ICD-10-CM

## 2025-06-03 DIAGNOSIS — K22.70 BARRETT'S ESOPHAGUS WITHOUT DYSPLASIA: ICD-10-CM

## 2025-06-03 LAB — PSA SERPL-MCNC: 0.2 NG/ML (ref 0–4)

## 2025-06-03 PROCEDURE — 84153 ASSAY OF PSA TOTAL: CPT

## 2025-06-03 PROCEDURE — 99214 OFFICE O/P EST MOD 30 MIN: CPT | Performed by: INTERNAL MEDICINE

## 2025-06-03 PROCEDURE — G2211 COMPLEX E/M VISIT ADD ON: HCPCS | Performed by: INTERNAL MEDICINE

## 2025-06-03 PROCEDURE — 36415 COLL VENOUS BLD VENIPUNCTURE: CPT

## 2025-06-03 RX ORDER — FAMOTIDINE 40 MG/1
40 TABLET, FILM COATED ORAL DAILY
Qty: 90 TABLET | Refills: 3 | Status: SHIPPED | OUTPATIENT
Start: 2025-06-03 | End: 2026-05-29

## 2025-06-03 NOTE — ASSESSMENT & PLAN NOTE
COPD is stable status post Proctor valve at [facility] he thinks that his breathing is much better than before and he is able to do much more work than he used to do before without getting short of breath

## 2025-06-03 NOTE — ASSESSMENT & PLAN NOTE
Orders:    famotidine (PEPCID) 40 MG tablet; Take 1 tablet (40 mg total) by mouth in the morning

## 2025-06-03 NOTE — PROGRESS NOTES
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    Orders:    famotidine (PEPCID) 40 MG tablet; Take 1 tablet (40 mg total) by mouth in the morning    COPD, severe (HCC)  COPD is stable status post Nocona valve at [facility] he thinks that his breathing is much better than before and he is able to do much more work than he used to do before without getting short of breath       Hiatal hernia  Continue with the Pepcid as before       Elevated prostate specific antigen (PSA)  Was recently seen by the urologist and PSA is pending  Orders:    PSA Total, Diagnostic; Future           History of Present Illness   Is a very pleasant 79 years young gentleman who is here today for the regular follow-up he is doing well no new complaints his breathing is much better he is status post Nocona valve done at [facility] for severe COPD he is also using Trelegy for his breathing but overall he is doing much better plan is to continue with the present management I will see him back in about 4 months and follow-up with that    4 BPH he was seen by the urologist recently will get the PSA and follow-up with that he is doing very well since he was is using finasteride we will continue with that and follow-up    Blood pressure is very well-controlled continue with the present management no new change      Review of Systems   Constitutional:  Negative for appetite change, fatigue and fever.   HENT:  Negative for congestion, ear pain, hearing loss, nosebleeds, sneezing, tinnitus and voice change.    Eyes:  Negative for pain, discharge and redness.   Respiratory:  Positive for shortness of breath. Negative for cough, chest tightness and wheezing.    Cardiovascular:  Negative for chest pain,  "palpitations and leg swelling.   Gastrointestinal:  Negative for abdominal pain, blood in stool, constipation, diarrhea, nausea and vomiting.   Genitourinary:  Negative for difficulty urinating, dysuria, hematuria and urgency.   Musculoskeletal:  Negative for arthralgias, back pain, gait problem and joint swelling.   Skin:  Negative for rash and wound.   Allergic/Immunologic: Negative for environmental allergies.   Neurological:  Negative for dizziness, tremors, seizures, weakness, light-headedness and numbness.   Hematological:  Negative for adenopathy. Does not bruise/bleed easily.   Psychiatric/Behavioral:  Negative for behavioral problems and confusion. The patient is not nervous/anxious.        Objective   /70 (BP Location: Left arm, Patient Position: Sitting, Cuff Size: Standard)   Pulse 74   Temp 99.1 °F (37.3 °C) (Temporal)   Ht 5' 8\" (1.727 m)   Wt 54.7 kg (120 lb 9.6 oz)   SpO2 92%   BMI 18.34 kg/m²      Physical Exam  Vitals and nursing note reviewed.   Constitutional:       Appearance: Normal appearance. He is normal weight.   HENT:      Head: Normocephalic and atraumatic.      Right Ear: Tympanic membrane normal.      Left Ear: Tympanic membrane normal.      Nose: Nose normal.      Mouth/Throat:      Mouth: Mucous membranes are moist.     Eyes:      Extraocular Movements: Extraocular movements intact.      Pupils: Pupils are equal, round, and reactive to light.       Cardiovascular:      Rate and Rhythm: Normal rate and regular rhythm.      Pulses: Normal pulses.      Heart sounds: Normal heart sounds.   Pulmonary:      Effort: Pulmonary effort is normal.      Breath sounds: Decreased air movement present. Decreased breath sounds present.   Abdominal:      General: Abdomen is flat. Bowel sounds are normal.      Palpations: Abdomen is soft.     Musculoskeletal:         General: No swelling, tenderness or deformity. Normal range of motion.      Cervical back: Normal range of motion and neck " supple.     Skin:     General: Skin is warm.      Capillary Refill: Capillary refill takes 2 to 3 seconds.     Neurological:      General: No focal deficit present.      Mental Status: He is alert and oriented to person, place, and time. Mental status is at baseline.     Psychiatric:         Mood and Affect: Mood normal.         Behavior: Behavior normal.

## 2025-06-04 ENCOUNTER — RESULTS FOLLOW-UP (OUTPATIENT)
Dept: UROLOGY | Facility: AMBULATORY SURGERY CENTER | Age: 80
End: 2025-06-04

## 2025-06-04 NOTE — TELEPHONE ENCOUNTER
Called and left DVM going over note from PA. Left call back number if patient calls back please relay info.      ----- Message from Al Morrow PA-C sent at 6/4/2025  9:41 AM EDT -----  Please call the patient advise him I reviewed his most recent PSA and it returned stable at a value of 0.198.  Can plan to repeat PSA in 1 year.  ----- Message -----  From: Lab, Background User  Sent: 6/3/2025   4:51 PM EDT  To: Al Morrow PA-C

## 2025-06-05 NOTE — TELEPHONE ENCOUNTER
2nd attempt .   Called and left DVM going over note from PA. Left call back number if patient calls back please relay info.      ----- Message from Al Morrow PA-C sent at 6/4/2025  9:41 AM EDT -----  Please call the patient advise him I reviewed his most recent PSA and it returned stable at a value of 0.198.  Can plan to repeat PSA in 1 year.  ----- Message -----  From: Lab, Background User  Sent: 6/3/2025   4:51 PM EDT  To: Al Morrow PA-C

## 2025-06-17 ENCOUNTER — TELEPHONE (OUTPATIENT)
Age: 80
End: 2025-06-17

## 2025-06-17 NOTE — PATIENT INSTRUCTIONS
Medicare Preventive Visit Patient Instructions  Thank you for completing your Welcome to Medicare Visit or Medicare Annual Wellness Visit today  Your next wellness visit will be due in one year (11/16/2023)  The screening/preventive services that you may require over the next 5-10 years are detailed below  Some tests may not apply to you based off risk factors and/or age  Screening tests ordered at today's visit but not completed yet may show as past due  Also, please note that scanned in results may not display below  Preventive Screenings:  Service Recommendations Previous Testing/Comments   Colorectal Cancer Screening  · Colonoscopy    · Fecal Occult Blood Test (FOBT)/Fecal Immunochemical Test (FIT)  · Fecal DNA/Cologuard Test  · Flexible Sigmoidoscopy Age: 39-70 years old   Colonoscopy: every 10 years (May be performed more frequently if at higher risk)  OR  FOBT/FIT: every 1 year  OR  Cologuard: every 3 years  OR  Sigmoidoscopy: every 5 years  Screening may be recommended earlier than age 39 if at higher risk for colorectal cancer  Also, an individualized decision between you and your healthcare provider will decide whether screening between the ages of 74-80 would be appropriate   Colonoscopy: 12/23/2019  FOBT/FIT: Not on file  Cologuard: 12/03/2019  Sigmoidoscopy: Not on file    History Colorectal Cancer     Prostate Cancer Screening Individualized decision between patient and health care provider in men between ages of 53-78   Medicare will cover every 12 months beginning on the day after your 50th birthday PSA: 0 3 ng/mL     History Prostate Cancer  Screening Not Indicated     Hepatitis C Screening Once for adults born between 1945 and 1965  More frequently in patients at high risk for Hepatitis C Hep C Antibody: 07/26/2021    Screening Current   Diabetes Screening 1-2 times per year if you're at risk for diabetes or have pre-diabetes Fasting glucose: 95 mg/dL (11/1/2022)  A1C: No results in last 5 years (No results in last 5 years)  Screening Current   Cholesterol Screening Once every 5 years if you don't have a lipid disorder  May order more often based on risk factors  Lipid panel: 11/01/2022  Screening Current      Other Preventive Screenings Covered by Medicare:  1  Abdominal Aortic Aneurysm (AAA) Screening: covered once if your at risk  You're considered to be at risk if you have a family history of AAA or a male between the age of 73-68 who smoking at least 100 cigarettes in your lifetime  2  Lung Cancer Screening: covers low dose CT scan once per year if you meet all of the following conditions: (1) Age 50-69; (2) No signs or symptoms of lung cancer; (3) Current smoker or have quit smoking within the last 15 years; (4) You have a tobacco smoking history of at least 20 pack years (packs per day x number of years you smoked); (5) You get a written order from a healthcare provider  3  Glaucoma Screening: covered annually if you're considered high risk: (1) You have diabetes OR (2) Family history of glaucoma OR (3)  aged 48 and older OR (3)  American aged 72 and older  3  Osteoporosis Screening: covered every 2 years if you meet one of the following conditions: (1) Have a vertebral abnormality; (2) On glucocorticoid therapy for more than 3 months; (3) Have primary hyperparathyroidism; (4) On osteoporosis medications and need to assess response to drug therapy  5  HIV Screening: covered annually if you're between the age of 12-76  Also covered annually if you are younger than 13 and older than 72 with risk factors for HIV infection  For pregnant patients, it is covered up to 3 times per pregnancy      Immunizations:  Immunization Recommendations   Influenza Vaccine Annual influenza vaccination during flu season is recommended for all persons aged >= 6 months who do not have contraindications   Pneumococcal Vaccine   * Pneumococcal conjugate vaccine = PCV13 (Prevnar 13), PCV15 (Vaxneuvance), PCV20 (Prevnar 20)  * Pneumococcal polysaccharide vaccine = PPSV23 (Pneumovax) Adults 2364 years old: 1-3 doses may be recommended based on certain risk factors  Adults 72 years old: 1-2 doses may be recommended based off what pneumonia vaccine you previously received   Hepatitis B Vaccine 3 dose series if at intermediate or high risk (ex: diabetes, end stage renal disease, liver disease)   Tetanus (Td) Vaccine - COST NOT COVERED BY MEDICARE PART B Following completion of primary series, a booster dose should be given every 10 years to maintain immunity against tetanus  Td may also be given as tetanus wound prophylaxis  Tdap Vaccine - COST NOT COVERED BY MEDICARE PART B Recommended at least once for all adults  For pregnant patients, recommended with each pregnancy  Shingles Vaccine (Shingrix) - COST NOT COVERED BY MEDICARE PART B  2 shot series recommended in those aged 48 and above     Health Maintenance Due:      Topic Date Due   • Lung Cancer Screening  03/02/2023   • Hepatitis C Screening  Completed     Immunizations Due:      Topic Date Due   • COVID-19 Vaccine (1) Never done   • Influenza Vaccine (1) 09/01/2022     Advance Directives   What are advance directives? Advance directives are legal documents that state your wishes and plans for medical care  These plans are made ahead of time in case you lose your ability to make decisions for yourself  Advance directives can apply to any medical decision, such as the treatments you want, and if you want to donate organs  What are the types of advance directives? There are many types of advance directives, and each state has rules about how to use them  You may choose a combination of any of the following:  · Living will: This is a written record of the treatment you want  You can also choose which treatments you do not want, which to limit, and which to stop at a certain time  This includes surgery, medicine, IV fluid, and tube feedings  · Durable power of  for healthcare [facility]): This is a written record that states who you want to make healthcare choices for you when you are unable to make them for yourself  This person, called a proxy, is usually a family member or a friend  You may choose more than 1 proxy  · Do not resuscitate (DNR) order:  A DNR order is used in case your heart stops beating or you stop breathing  It is a request not to have certain forms of treatment, such as CPR  A DNR order may be included in other types of advance directives  · Medical directive: This covers the care that you want if you are in a coma, near death, or unable to make decisions for yourself  You can list the treatments you want for each condition  Treatment may include pain medicine, surgery, blood transfusions, dialysis, IV or tube feedings, and a ventilator (breathing machine)  · Values history: This document has questions about your views, beliefs, and how you feel and think about life  This information can help others choose the care that you would choose  Why are advance directives important? An advance directive helps you control your care  Although spoken wishes may be used, it is better to have your wishes written down  Spoken wishes can be misunderstood, or not followed  Treatments may be given even if you do not want them  An advance directive may make it easier for your family to make difficult choices about your care  Cigarette Smoking and Your Health   Risks to your health if you smoke:  Nicotine and other chemicals found in tobacco damage every cell in your body  Even if you are a light smoker, you have an increased risk for cancer, heart disease, and lung disease  If you are pregnant or have diabetes, smoking increases your risk for complications  Benefits to your health if you stop smoking:   · You decrease respiratory symptoms such as coughing, wheezing, and shortness of breath     · You reduce your risk for cancers of the lung, mouth, throat, kidney, bladder, pancreas, stomach, and cervix  If you already have cancer, you increase the benefits of chemotherapy  You also reduce your risk for cancer returning or a second cancer from developing  · You reduce your risk for heart disease, blood clots, heart attack, and stroke  · You reduce your risk for lung infections, and diseases such as pneumonia, asthma, chronic bronchitis, and emphysema  · Your circulation improves  More oxygen can be delivered to your body  If you have diabetes, you lower your risk for complications, such as kidney, artery, and eye diseases  You also lower your risk for nerve damage  Nerve damage can lead to amputations, poor vision, and blindness  · You improve your body's ability to heal and to fight infections  For more information and support to stop smoking:   · [employer]  Phone: 8- 198 - [phone]  Web Address: [employer]  Underweight  Underweight is defined as having a body mass index (BMI) of less than 18 5 kg/m2   Anorexia  is a loss of appetite, decreased food intake, or both  Your appetite naturally decreases as you get older  You also get full faster than you used to  This occurs because your body needs less energy  Other body changes can also lead to a decreased appetite  Even though some appetite loss is normal, you still need to get enough calories and nutrients to keep you healthy  You can start to lose too much weight if you do not eat as much food as your body needs  Unwanted weight loss can cause health problems, or worsen health problems you already have  You can also become dehydrated if you do not drink enough liquid  How to eat healthy and get enough nutrients:   · Choose healthy foods  Eat a variety of fruits, vegetables, whole grains, low-fat dairy foods, lean meats, and other protein foods  Limit foods high in fat, sugar, and salt  Limit or avoid alcohol as directed   Work with a dietitian to help you plan your meals if you need to follow a special diet  A dietitian can also teach you how to modify foods if you have trouble chewing or swallowing  · Snack on healthy foods between meals  if you only eat a small amount during meals  Snacks provide extra healthy nutrients and calories between meals  Examples include fruit, cheese, and whole grain crackers  · Drink liquids as directed  to avoid dehydration  Drink liquids between meals if they cause you to get full too quickly during meals  Ask how much liquid to drink each day and which liquids are best for you  · Use herbs, spices, and flavor enhancers to add flavor to foods  Avoid using herbs and spice blends that also contain sodium  Ask your healthcare provider or dietitian about flavor enhancers  Flavor enhancers with ham, natural perez, and roast beef flavors can also be sprinkled on food to add flavor  · Share meals with others as often as you can  Eating with others may help you to eat better during meal time  Ask family members, neighbors, or friends to join you for lunch  There are also senior centers where you can meet people, and share meals with them  · Ask family and friends for help  with shopping or preparing foods  Ask for a ride to the grocery store, if needed  © Copyright [employer] 2018 Information is for End User's use only and may not be sold, redistributed or otherwise used for commercial purposes   All illustrations and images included in CareNotes® are the copyrighted property of [employer]  or [address] [place] [address] [employer]  yes

## 2025-06-17 NOTE — TELEPHONE ENCOUNTER
Received call from Patient for New Patient - Annual Skin Check. Scheduled 1/22/26 8:20 am Tex Chaidez. Verified insurance, provided Dm addr.     Patient verbalized understanding.

## 2025-07-14 DIAGNOSIS — J44.9 COPD, SEVERE (HCC): ICD-10-CM

## 2025-07-14 NOTE — TELEPHONE ENCOUNTER
Reason for call:   [x] Refill   [] Prior Auth  [] Other:     Office:   [] PCP/Provider -   [x] Specialty/Provider - Pulm    Medication:   - fluticasone-umeclidinium-vilanterol (Trelegy Ellipta) 200-62.5-25 mcg/actuation AEPB inhaler- inhale 1 puff daily      Pharmacy: [facility]   Does the patient have enough for 3 days?   [x] Yes   [] No - Send as HP to POD    Mail Away Pharmacy   Does the patient have enough for 10 days?   [] Yes   [] No - Send as HP to POD

## 2025-07-16 RX ORDER — FLUTICASONE FUROATE, UMECLIDINIUM BROMIDE AND VILANTEROL TRIFENATATE 200; 62.5; 25 UG/1; UG/1; UG/1
1 POWDER RESPIRATORY (INHALATION) DAILY
Qty: 180 BLISTER | Refills: 0 | Status: SHIPPED | OUTPATIENT
Start: 2025-07-16 | End: 2026-01-12

## 2025-08-20 ENCOUNTER — OFFICE VISIT (OUTPATIENT)
Dept: UROLOGY | Facility: AMBULATORY SURGERY CENTER | Age: 80
End: 2025-08-20
Payer: COMMERCIAL

## 2025-08-20 VITALS
BODY MASS INDEX: 17.37 KG/M2 | WEIGHT: 114.6 LBS | DIASTOLIC BLOOD PRESSURE: 74 MMHG | SYSTOLIC BLOOD PRESSURE: 126 MMHG | HEART RATE: 40 BPM | OXYGEN SATURATION: 98 % | HEIGHT: 68 IN

## 2025-08-20 DIAGNOSIS — N13.8 BENIGN PROSTATIC HYPERPLASIA WITH URINARY OBSTRUCTION: ICD-10-CM

## 2025-08-20 DIAGNOSIS — C61 PROSTATE CANCER (HCC): ICD-10-CM

## 2025-08-20 DIAGNOSIS — R35.1 BENIGN PROSTATIC HYPERPLASIA WITH NOCTURIA: Primary | ICD-10-CM

## 2025-08-20 DIAGNOSIS — N40.1 BENIGN PROSTATIC HYPERPLASIA WITH URINARY OBSTRUCTION: ICD-10-CM

## 2025-08-20 DIAGNOSIS — N52.9 ERECTILE DYSFUNCTION, UNSPECIFIED ERECTILE DYSFUNCTION TYPE: ICD-10-CM

## 2025-08-20 DIAGNOSIS — N40.1 BENIGN PROSTATIC HYPERPLASIA WITH NOCTURIA: Primary | ICD-10-CM

## 2025-08-20 DIAGNOSIS — L72.3 SEBACEOUS CYST: ICD-10-CM

## 2025-08-20 LAB — POST-VOID RESIDUAL VOLUME, ML POC: 109 ML

## 2025-08-20 PROCEDURE — 51798 US URINE CAPACITY MEASURE: CPT

## 2025-08-20 PROCEDURE — 99214 OFFICE O/P EST MOD 30 MIN: CPT

## 2025-08-20 RX ORDER — FINASTERIDE 5 MG/1
5 TABLET, FILM COATED ORAL DAILY
Qty: 90 TABLET | Refills: 3 | Status: SHIPPED | OUTPATIENT
Start: 2025-08-20

## (undated) DEVICE — PAD PERINEAL

## (undated) DEVICE — DRAPE SHEET THREE QUARTER

## (undated) DEVICE — SYRINGE 50ML LL

## (undated) DEVICE — BETHLEHEM UNIVERSAL MINOR GEN: Brand: CARDINAL HEALTH

## (undated) DEVICE — THE SIMPULSE SOLO SYSTEM WITH ULTREX RETRACTABLE SPLASH SHIELD TIP: Brand: SIMPULSE SOLO

## (undated) DEVICE — PINNACLE INTRODUCER SHEATH: Brand: PINNACLE

## (undated) DEVICE — 6617 IOBAN II PATIENT ISOLATION DRAPE 5/BX,4BX/CS: Brand: STERI-DRAPE™ IOBAN™ 2

## (undated) DEVICE — SUT VICRYL 0 CT-1 27 IN J260H

## (undated) DEVICE — DUAL CUT SAGITTAL BLADE

## (undated) DEVICE — ARTHROSCOPY FLOOR MAT

## (undated) DEVICE — DRAPE C-ARM X-RAY

## (undated) DEVICE — HEAVY DUTY TABLE COVER: Brand: CONVERTORS

## (undated) DEVICE — CAPIT HIP COP -CMNT/POR-ACTIS

## (undated) DEVICE — 450 ML BOTTLE OF 0.05% CHLORHEXIDINE GLUCONATE IN 99.95% STERILE WATER FOR IRRIGATION, USP AND APPLICATOR.: Brand: IRRISEPT ANTIMICROBIAL WOUND LAVAGE

## (undated) DEVICE — GLOVE SRG BIOGEL ORTHOPEDIC 8.5

## (undated) DEVICE — U-DRAPE: Brand: CONVERTORS

## (undated) DEVICE — SURGICAL GOWN, XL SMARTSLEEVE: Brand: CONVERTORS

## (undated) DEVICE — NEEDLE 18 G X 1 1/2

## (undated) DEVICE — ADHESIVE SKIN CLOSR DERMABOND PRINEO

## (undated) DEVICE — SUT STRATAFIX SPIRAL MONOCRYL PLUS 3-0 PS-2 45CM SXMP1B107

## (undated) DEVICE — MEDI-VAC YANKAUER SUCTION HANDLE W/STRAIGHT TIP & CONTROL VENT: Brand: CARDINAL HEALTH

## (undated) DEVICE — CATH SWAN GANZ TD 7FR 110CM

## (undated) DEVICE — GLOVE INDICATOR PI UNDERGLOVE SZ 6.5 BLUE

## (undated) DEVICE — INTENDED FOR TISSUE SEPARATION, AND OTHER PROCEDURES THAT REQUIRE A SHARP SURGICAL BLADE TO PUNCTURE OR CUT.: Brand: BARD-PARKER SAFETY BLADES SIZE 10, STERILE

## (undated) DEVICE — SUT VICRYL 2-0 CT-1 27 IN J259H

## (undated) DEVICE — PLUMEPEN PRO 10FT

## (undated) DEVICE — ELECTRODE BLADE MOD  E-Z CLEAN 6.5IN -0014M

## (undated) DEVICE — COBAN 6 IN STERILE

## (undated) DEVICE — GLOVE SRG BIOGEL 6.5

## (undated) DEVICE — MICROPUNCTURE INTRODUCER SET SILHOUETTE TRANSITIONLESS PUSH-PLUS DESIGN - STIFFENED CANNULA WITH NITINOL WIRE GUIDE: Brand: MICROPUNCTURE

## (undated) DEVICE — SUT STRATAFIX SPIRAL MONOCRYL PLUS 4-0 PS-2 30CM SXMP1B117

## (undated) DEVICE — SCD SEQUENTIAL COMPRESSION COMFORT SLEEVE MEDIUM KNEE LENGTH: Brand: KENDALL SCD

## (undated) DEVICE — TUBING SUCTION 5MM X 12 FT

## (undated) DEVICE — SUT STRATAFIX SPIRAL 1 CT-1 24 X 24CM SXPL2B400

## (undated) DEVICE — CAST PADDING 4 IN UNSTERILE

## (undated) DEVICE — GLOVE INDICATOR PI UNDERGLOVE SZ 8.5 BLUE

## (undated) DEVICE — 3M™ STERI-DRAPE™ U-DRAPE 1015: Brand: STERI-DRAPE™

## (undated) DEVICE — HOOD: Brand: FLYTE, SURGICOOL